# Patient Record
Sex: FEMALE | Race: WHITE | NOT HISPANIC OR LATINO | Employment: FULL TIME | URBAN - METROPOLITAN AREA
[De-identification: names, ages, dates, MRNs, and addresses within clinical notes are randomized per-mention and may not be internally consistent; named-entity substitution may affect disease eponyms.]

---

## 2017-01-12 ENCOUNTER — HOSPITAL ENCOUNTER (OUTPATIENT)
Dept: MAMMOGRAPHY | Facility: MEDICAL CENTER | Age: 42
Discharge: HOME | End: 2017-01-12
Payer: COMMERCIAL

## 2017-01-12 DIAGNOSIS — B02.29 OTHER POSTHERPETIC NERVOUS SYSTEM INVOLVEMENT: ICD-10-CM

## 2017-01-12 DIAGNOSIS — Z12.31 VISIT FOR SCREENING MAMMOGRAM: ICD-10-CM

## 2017-01-12 PROCEDURE — G0202 SCR MAMMO BI INCL CAD: HCPCS

## 2017-01-12 PROCEDURE — 77063 BREAST TOMOSYNTHESIS BI: CPT

## 2017-06-15 ENCOUNTER — APPOINTMENT (OUTPATIENT)
Dept: LAB | Facility: MEDICAL CENTER | Age: 42
End: 2017-06-15
Payer: COMMERCIAL

## 2017-06-15 ENCOUNTER — TRANSCRIBE ORDERS (OUTPATIENT)
Dept: ADMINISTRATIVE | Facility: HOSPITAL | Age: 42
End: 2017-06-15

## 2017-06-15 DIAGNOSIS — Z00.8 HEALTH EXAMINATION IN POPULATION SURVEY: ICD-10-CM

## 2017-06-15 DIAGNOSIS — Z00.8 HEALTH EXAMINATION IN POPULATION SURVEY: Primary | ICD-10-CM

## 2017-06-15 DIAGNOSIS — Z13.29 ENCOUNTER FOR SCREENING FOR OTHER SUSPECTED ENDOCRINE DISORDER: ICD-10-CM

## 2017-06-15 LAB
25(OH)D3 SERPL-MCNC: 28.4 NG/ML (ref 30–100)
ALBUMIN SERPL BCP-MCNC: 4.1 G/DL (ref 3.5–5)
ALP SERPL-CCNC: 54 U/L (ref 46–116)
ALT SERPL W P-5'-P-CCNC: 17 U/L (ref 12–78)
ANION GAP SERPL CALCULATED.3IONS-SCNC: 8 MMOL/L (ref 4–13)
AST SERPL W P-5'-P-CCNC: 14 U/L (ref 5–45)
BACTERIA UR QL AUTO: ABNORMAL /HPF
BASOPHILS # BLD AUTO: 0.03 THOUSANDS/ΜL (ref 0–0.1)
BASOPHILS NFR BLD AUTO: 0 % (ref 0–1)
BILIRUB SERPL-MCNC: 0.74 MG/DL (ref 0.2–1)
BILIRUB UR QL STRIP: NEGATIVE
BUN SERPL-MCNC: 13 MG/DL (ref 5–25)
CALCIUM SERPL-MCNC: 8.9 MG/DL (ref 8.3–10.1)
CHLORIDE SERPL-SCNC: 104 MMOL/L (ref 100–108)
CHOLEST SERPL-MCNC: 191 MG/DL (ref 50–200)
CLARITY UR: ABNORMAL
CO2 SERPL-SCNC: 27 MMOL/L (ref 21–32)
COLOR UR: YELLOW
CREAT SERPL-MCNC: 0.74 MG/DL (ref 0.6–1.3)
EOSINOPHIL # BLD AUTO: 0.15 THOUSAND/ΜL (ref 0–0.61)
EOSINOPHIL NFR BLD AUTO: 2 % (ref 0–6)
ERYTHROCYTE [DISTWIDTH] IN BLOOD BY AUTOMATED COUNT: 12.7 % (ref 11.6–15.1)
EST. AVERAGE GLUCOSE BLD GHB EST-MCNC: 111 MG/DL
GFR SERPL CREATININE-BSD FRML MDRD: >60 ML/MIN/1.73SQ M
GLUCOSE P FAST SERPL-MCNC: 85 MG/DL (ref 65–99)
GLUCOSE UR STRIP-MCNC: NEGATIVE MG/DL
HBA1C MFR BLD: 5.5 % (ref 4.2–6.3)
HCT VFR BLD AUTO: 40.1 % (ref 34.8–46.1)
HDLC SERPL-MCNC: 65 MG/DL (ref 40–60)
HGB BLD-MCNC: 13.4 G/DL (ref 11.5–15.4)
HGB UR QL STRIP.AUTO: NEGATIVE
KETONES UR STRIP-MCNC: NEGATIVE MG/DL
LDLC SERPL CALC-MCNC: 113 MG/DL (ref 0–100)
LEUKOCYTE ESTERASE UR QL STRIP: ABNORMAL
LYMPHOCYTES # BLD AUTO: 1.95 THOUSANDS/ΜL (ref 0.6–4.47)
LYMPHOCYTES NFR BLD AUTO: 21 % (ref 14–44)
MCH RBC QN AUTO: 30.1 PG (ref 26.8–34.3)
MCHC RBC AUTO-ENTMCNC: 33.4 G/DL (ref 31.4–37.4)
MCV RBC AUTO: 90 FL (ref 82–98)
MONOCYTES # BLD AUTO: 0.59 THOUSAND/ΜL (ref 0.17–1.22)
MONOCYTES NFR BLD AUTO: 6 % (ref 4–12)
NEUTROPHILS # BLD AUTO: 6.49 THOUSANDS/ΜL (ref 1.85–7.62)
NEUTS SEG NFR BLD AUTO: 71 % (ref 43–75)
NITRITE UR QL STRIP: NEGATIVE
NON-SQ EPI CELLS URNS QL MICRO: ABNORMAL /HPF
NRBC BLD AUTO-RTO: 0 /100 WBCS
PH UR STRIP.AUTO: 5.5 [PH] (ref 4.5–8)
PLATELET # BLD AUTO: 255 THOUSANDS/UL (ref 149–390)
PMV BLD AUTO: 10.3 FL (ref 8.9–12.7)
POTASSIUM SERPL-SCNC: 3.8 MMOL/L (ref 3.5–5.3)
PROT SERPL-MCNC: 7.3 G/DL (ref 6.4–8.2)
PROT UR STRIP-MCNC: NEGATIVE MG/DL
RBC # BLD AUTO: 4.45 MILLION/UL (ref 3.81–5.12)
RBC #/AREA URNS AUTO: ABNORMAL /HPF
SODIUM SERPL-SCNC: 139 MMOL/L (ref 136–145)
SP GR UR STRIP.AUTO: 1.02 (ref 1–1.03)
TRIGL SERPL-MCNC: 67 MG/DL
TSH SERPL DL<=0.05 MIU/L-ACNC: 1.36 UIU/ML (ref 0.36–3.74)
UROBILINOGEN UR QL STRIP.AUTO: 0.2 E.U./DL
WBC # BLD AUTO: 9.22 THOUSAND/UL (ref 4.31–10.16)
WBC #/AREA URNS AUTO: ABNORMAL /HPF

## 2017-06-15 PROCEDURE — 80061 LIPID PANEL: CPT

## 2017-06-15 PROCEDURE — 83036 HEMOGLOBIN GLYCOSYLATED A1C: CPT

## 2017-06-15 PROCEDURE — 85025 COMPLETE CBC W/AUTO DIFF WBC: CPT

## 2017-06-15 PROCEDURE — 80053 COMPREHEN METABOLIC PANEL: CPT

## 2017-06-15 PROCEDURE — 82306 VITAMIN D 25 HYDROXY: CPT

## 2017-06-15 PROCEDURE — 81001 URINALYSIS AUTO W/SCOPE: CPT

## 2017-06-15 PROCEDURE — 36415 COLL VENOUS BLD VENIPUNCTURE: CPT

## 2017-06-15 PROCEDURE — 84443 ASSAY THYROID STIM HORMONE: CPT

## 2018-04-25 ENCOUNTER — TRANSCRIBE ORDERS (OUTPATIENT)
Dept: INTERNAL MEDICINE CLINIC | Facility: CLINIC | Age: 43
End: 2018-04-25

## 2018-04-25 ENCOUNTER — TELEPHONE (OUTPATIENT)
Dept: INTERNAL MEDICINE CLINIC | Facility: CLINIC | Age: 43
End: 2018-04-25

## 2018-04-25 DIAGNOSIS — Z12.31 SCREENING MAMMOGRAM, ENCOUNTER FOR: Primary | ICD-10-CM

## 2018-04-25 NOTE — TELEPHONE ENCOUNTER
Pt called requesting a script for a routine mammogram  She usually goes to [facility] so they can pull it up in system  Please advise

## 2018-04-25 NOTE — TELEPHONE ENCOUNTER
I put in system but can you help me out   When patients call in the future can you ask them when they were in last  It looks like she hasnt been in since 2016 which could be wrong but if this is true she needs a physical

## 2018-05-22 ENCOUNTER — HOSPITAL ENCOUNTER (OUTPATIENT)
Dept: MAMMOGRAPHY | Facility: MEDICAL CENTER | Age: 43
Discharge: HOME | End: 2018-05-22
Payer: COMMERCIAL

## 2018-05-22 DIAGNOSIS — Z12.31 SCREENING MAMMOGRAM, ENCOUNTER FOR: ICD-10-CM

## 2018-05-22 PROCEDURE — 77063 BREAST TOMOSYNTHESIS BI: CPT

## 2018-05-22 PROCEDURE — 77067 SCR MAMMO BI INCL CAD: CPT

## 2018-06-13 ENCOUNTER — TRANSCRIBE ORDERS (OUTPATIENT)
Dept: ADMINISTRATIVE | Age: 43
End: 2018-06-13

## 2018-06-13 ENCOUNTER — APPOINTMENT (OUTPATIENT)
Dept: LAB | Age: 43
End: 2018-06-13

## 2018-06-13 DIAGNOSIS — Z00.8 HEALTH EXAMINATION IN POPULATION SURVEY: Primary | ICD-10-CM

## 2018-06-13 DIAGNOSIS — Z00.8 HEALTH EXAMINATION IN POPULATION SURVEY: ICD-10-CM

## 2018-06-13 LAB
CHOLEST SERPL-MCNC: 194 MG/DL (ref 50–200)
EST. AVERAGE GLUCOSE BLD GHB EST-MCNC: 108 MG/DL
HBA1C MFR BLD: 5.4 % (ref 4.2–6.3)
HDLC SERPL-MCNC: 61 MG/DL (ref 40–60)
LDLC SERPL CALC-MCNC: 117 MG/DL (ref 0–100)
NONHDLC SERPL-MCNC: 133 MG/DL
TRIGL SERPL-MCNC: 80 MG/DL

## 2018-06-13 PROCEDURE — 83036 HEMOGLOBIN GLYCOSYLATED A1C: CPT

## 2018-06-13 PROCEDURE — 36415 COLL VENOUS BLD VENIPUNCTURE: CPT

## 2018-06-13 PROCEDURE — 80061 LIPID PANEL: CPT

## 2018-11-21 ENCOUNTER — OFFICE VISIT (OUTPATIENT)
Dept: FAMILY MEDICINE CLINIC | Facility: CLINIC | Age: 43
End: 2018-11-21
Payer: COMMERCIAL

## 2018-11-21 VITALS
HEART RATE: 83 BPM | DIASTOLIC BLOOD PRESSURE: 78 MMHG | WEIGHT: 151 LBS | OXYGEN SATURATION: 98 % | SYSTOLIC BLOOD PRESSURE: 112 MMHG | BODY MASS INDEX: 24.27 KG/M2 | HEIGHT: 66 IN

## 2018-11-21 DIAGNOSIS — Z00.00 ANNUAL PHYSICAL EXAM: ICD-10-CM

## 2018-11-21 DIAGNOSIS — Z13.29 SCREENING FOR THYROID DISORDER: Primary | ICD-10-CM

## 2018-11-21 DIAGNOSIS — B02.9 HERPES ZOSTER WITHOUT COMPLICATION: ICD-10-CM

## 2018-11-21 DIAGNOSIS — Z01.419 ENCOUNTER FOR GYNECOLOGICAL EXAMINATION: ICD-10-CM

## 2018-11-21 DIAGNOSIS — Z13.21 ENCOUNTER FOR VITAMIN DEFICIENCY SCREENING: ICD-10-CM

## 2018-11-21 PROCEDURE — 99396 PREV VISIT EST AGE 40-64: CPT | Performed by: INTERNAL MEDICINE

## 2018-11-21 RX ORDER — VALACYCLOVIR HYDROCHLORIDE 1 G/1
1000 TABLET, FILM COATED ORAL 3 TIMES DAILY
Qty: 21 TABLET | Refills: 0 | Status: SHIPPED | OUTPATIENT
Start: 2018-11-21 | End: 2018-11-21 | Stop reason: SDUPTHER

## 2018-11-21 RX ORDER — VALACYCLOVIR HYDROCHLORIDE 1 G/1
1000 TABLET, FILM COATED ORAL 3 TIMES DAILY
Qty: 21 TABLET | Refills: 0 | Status: SHIPPED | OUTPATIENT
Start: 2018-11-21 | End: 2021-03-29

## 2018-11-21 RX ORDER — MENTHOL AND ZINC OXIDE .44; 20.625 G/100G; G/100G
OINTMENT TOPICAL 2 TIMES DAILY
Qty: 1 TUBE | Refills: 0 | Status: SHIPPED | OUTPATIENT
Start: 2018-11-21 | End: 2018-11-21 | Stop reason: SDUPTHER

## 2018-11-21 RX ORDER — MENTHOL AND ZINC OXIDE .44; 20.625 G/100G; G/100G
OINTMENT TOPICAL 2 TIMES DAILY
Qty: 1 TUBE | Refills: 0 | Status: SHIPPED | OUTPATIENT
Start: 2018-11-21 | End: 2021-03-29

## 2018-11-21 RX ORDER — BIOTIN 1 MG
TABLET ORAL
COMMUNITY

## 2018-11-21 NOTE — PROGRESS NOTES
ADULT ANNUAL PHYSICAL  [facility]    NAME: Felice Singh  AGE: 37 y o  SEX: female  : 1975     DATE: 2018     Assessment and Plan:     Diagnoses and all orders for this visit:    Screening for thyroid disorder  -     CBC and differential  -     Comprehensive metabolic panel  -     TSH, 3rd generation with Free T4 reflex    Annual physical exam    Encounter for vitamin deficiency screening  -     Vitamin D 25 hydroxy    Encounter for gynecological examination  -     Ambulatory referral to Gynecology; Future    Herpes zoster without complication  -     Discontinue: valACYclovir (VALTREX) 1,000 mg tablet; Take 1 tablet (1,000 mg total) by mouth 3 (three) times a day for 7 days  -     Discontinue: menthol-zinc oxide (CALMOSEPTINE) 0 44-20 625 %; Apply topically 2 (two) times a day  -     menthol-zinc oxide (CALMOSEPTINE) 0 44-20 625 %; Apply topically 2 (two) times a day  -     valACYclovir (VALTREX) 1,000 mg tablet; Take 1 tablet (1,000 mg total) by mouth 3 (three) times a day for 7 days    Other orders  -     Cholecalciferol (VITAMIN D3) 1000 units CAPS; Take by mouth  -     Ascorbic Acid (VITAMIN C) 500 MG/5ML LIQD; Take 1 tablet by mouth daily  -     Calcium Carbonate (CALTRATE 600) 1500 (600 Ca) MG TABS; Take by mouth        Health maintenance and preventative care screenings were discussed with patient today  Appropriate education was printed on patient's after visit summary  · Discussed risks/benefits of screening for breast cancer  Patient is up-to-date with their preventive screenings  · Immunizations were reviewed: patient is up-to-date with her immunizations  Counseling:  · Sexual health: discussed sexually transmitted diseases, partner selection, use of condoms, avoidance of unintended pregnancy, and contraceptive alternatives  No Follow-up on file       Chief Complaint:     Chief Complaint   Patient presents with   Leidy Perales Follow-up    Rash      History of Present Illness:     Adult Annual Physical   Patient here for a comprehensive physical exam  The patient reports problems - Rash right breast     Diet and Physical Activity  · Diet/Nutrition: well balanced diet  · Weight concerns: none, patient's BMI is between 18 5-24 9  · Exercise: walking  Depression Screening  PHQ-9 Depression Screening    PHQ-9:    Frequency of the following problems over the past two weeks:       Little interest or pleasure in doing things:  0 - not at all  Feeling down, depressed, or hopeless:  0 - not at all  PHQ-2 Score:  0       General Health  · Sleep: sleeps well  · Hearing: normal - bilateral   · Vision: no vision problems  · Dental: regular dental visits  /GYN Health  · Patient is: premenopausal  · Last menstrual period:   · Contraceptive method: barrier methods  · Menopausal symptoms: none  Review of Systems:     Review of Systems   Constitutional: Negative for appetite change, chills, fatigue, fever and unexpected weight change  HENT: Negative for congestion, hearing loss, postnasal drip, trouble swallowing and voice change  Eyes: Negative for pain and visual disturbance  Respiratory: Negative for cough, chest tightness and shortness of breath  Cardiovascular: Negative for chest pain, palpitations and leg swelling  Gastrointestinal: Negative for abdominal pain, blood in stool, constipation, diarrhea, nausea and vomiting  Endocrine: Negative for cold intolerance, heat intolerance, polydipsia and polyphagia  Genitourinary: Negative for difficulty urinating, flank pain, frequency and hematuria  Musculoskeletal: Negative for arthralgias, back pain, gait problem, joint swelling and myalgias  Skin: Positive for rash  Pruritic rash for 3 days   Neurological: Negative for dizziness, weakness, light-headedness, numbness and headaches  Hematological: Negative for adenopathy  Does not bruise/bleed easily  Psychiatric/Behavioral: Negative for confusion, dysphoric mood and sleep disturbance  Past Medical History:     Past Medical History:   Diagnosis Date    Anxiety     Back pain     CVA (cerebral vascular accident) ([address] )     Depression     Hypertension     Palpitation     TIA (transient ischemic attack)     Ventral hernia       Past Surgical History:     No past surgical history on file  Social History:     Social History     Social History    Marital status: Single     Spouse name: N/A    Number of children: N/A    Years of education: N/A     Social History Main Topics    Smoking status: Former Smoker    Smokeless tobacco: Never Used    Alcohol use No    Drug use: No    Sexual activity: Yes     Partners: Male     Birth control/ protection: Post-menopausal     Other Topics Concern    None     Social History Narrative    Pt states she has coffee daily      Family History:     No family history on file  Current Medications:     Current Outpatient Prescriptions   Medication Sig Dispense Refill    Ascorbic Acid (VITAMIN C) 500 MG/5ML LIQD Take 1 tablet by mouth daily      Calcium Carbonate (CALTRATE 600) 1500 (600 Ca) MG TABS Take by mouth      Cholecalciferol (VITAMIN D3) 1000 units CAPS Take by mouth      menthol-zinc oxide (CALMOSEPTINE) 0 44-20 625 % Apply topically 2 (two) times a day 1 Tube 0    valACYclovir (VALTREX) 1,000 mg tablet Take 1 tablet (1,000 mg total) by mouth 3 (three) times a day for 7 days 21 tablet 0     No current facility-administered medications for this visit  Allergies:     No Known Allergies   Objective:     /78 (BP Location: Left arm, Patient Position: Sitting, Cuff Size: Standard)   Pulse 83   Ht 5' 6" (1 676 m)   Wt 68 5 kg (151 lb)   SpO2 98%   BMI 24 37 kg/m²   Physical Exam   Constitutional: She appears well-developed and well-nourished  No distress  HENT:   Head: Normocephalic and atraumatic     Eyes: Pupils are equal, round, and reactive to light  Conjunctivae are normal  No scleral icterus  Neck: No thyromegaly present  Cardiovascular: Normal rate, regular rhythm and normal heart sounds  Exam reveals no gallop and no friction rub  No murmur heard  Pulmonary/Chest: Effort normal and breath sounds normal  No respiratory distress  She has no wheezes  She has no rales  She exhibits no tenderness  Abdominal: Soft  Bowel sounds are normal  She exhibits no distension and no mass  There is no tenderness  There is no rebound and no guarding  Musculoskeletal: She exhibits no edema  Lymphadenopathy:     She has no cervical adenopathy  Neurological: No cranial nerve deficit  Skin: Rash noted  Two groups of vesicles erythematous base right breast T4 dermatome  Psychiatric: She has a normal mood and affect   Her behavior is normal  Thought content normal         Health Maintenance:     Health Maintenance Topics with due status: Not Due       Topic Last Completion Date    Depression Screening [facility] 11/21/2018     Health Maintenance Topics with due status: Overdue       Topic Date Due    DTaP,Tdap,and Td Vaccines 06/01/1994     Immunization History   Administered Date(s) Administered    Fluzone Split Quad 0 5 mL 10/21/2018    Influenza 11/14/2016       Crystal Spencer MD  [address]

## 2019-03-18 ENCOUNTER — TELEPHONE (OUTPATIENT)
Dept: FAMILY MEDICINE CLINIC | Facility: CLINIC | Age: 44
End: 2019-03-18

## 2019-03-18 ENCOUNTER — APPOINTMENT (OUTPATIENT)
Dept: LAB | Facility: MEDICAL CENTER | Age: 44
End: 2019-03-18
Payer: COMMERCIAL

## 2019-03-18 LAB
25(OH)D3 SERPL-MCNC: 37.2 NG/ML (ref 30–100)
ALBUMIN SERPL BCP-MCNC: 3.8 G/DL (ref 3.5–5)
ALP SERPL-CCNC: 50 U/L (ref 46–116)
ALT SERPL W P-5'-P-CCNC: 18 U/L (ref 12–78)
ANION GAP SERPL CALCULATED.3IONS-SCNC: 1 MMOL/L (ref 4–13)
AST SERPL W P-5'-P-CCNC: 14 U/L (ref 5–45)
BASOPHILS # BLD AUTO: 0.05 THOUSANDS/ΜL (ref 0–0.1)
BASOPHILS NFR BLD AUTO: 1 % (ref 0–1)
BILIRUB SERPL-MCNC: 0.8 MG/DL (ref 0.2–1)
BUN SERPL-MCNC: 15 MG/DL (ref 5–25)
CALCIUM SERPL-MCNC: 8.5 MG/DL (ref 8.3–10.1)
CHLORIDE SERPL-SCNC: 106 MMOL/L (ref 100–108)
CO2 SERPL-SCNC: 29 MMOL/L (ref 21–32)
CREAT SERPL-MCNC: 0.82 MG/DL (ref 0.6–1.3)
EOSINOPHIL # BLD AUTO: 0.23 THOUSAND/ΜL (ref 0–0.61)
EOSINOPHIL NFR BLD AUTO: 3 % (ref 0–6)
ERYTHROCYTE [DISTWIDTH] IN BLOOD BY AUTOMATED COUNT: 13.1 % (ref 11.6–15.1)
GFR SERPL CREATININE-BSD FRML MDRD: 87 ML/MIN/1.73SQ M
GLUCOSE P FAST SERPL-MCNC: 82 MG/DL (ref 65–99)
HCT VFR BLD AUTO: 38.6 % (ref 34.8–46.1)
HGB BLD-MCNC: 12.1 G/DL (ref 11.5–15.4)
IMM GRANULOCYTES # BLD AUTO: 0.04 THOUSAND/UL (ref 0–0.2)
IMM GRANULOCYTES NFR BLD AUTO: 1 % (ref 0–2)
LYMPHOCYTES # BLD AUTO: 1.66 THOUSANDS/ΜL (ref 0.6–4.47)
LYMPHOCYTES NFR BLD AUTO: 24 % (ref 14–44)
MCH RBC QN AUTO: 29.4 PG (ref 26.8–34.3)
MCHC RBC AUTO-ENTMCNC: 31.3 G/DL (ref 31.4–37.4)
MCV RBC AUTO: 94 FL (ref 82–98)
MONOCYTES # BLD AUTO: 0.41 THOUSAND/ΜL (ref 0.17–1.22)
MONOCYTES NFR BLD AUTO: 6 % (ref 4–12)
NEUTROPHILS # BLD AUTO: 4.47 THOUSANDS/ΜL (ref 1.85–7.62)
NEUTS SEG NFR BLD AUTO: 65 % (ref 43–75)
NRBC BLD AUTO-RTO: 0 /100 WBCS
PLATELET # BLD AUTO: 183 THOUSANDS/UL (ref 149–390)
PMV BLD AUTO: 10.2 FL (ref 8.9–12.7)
POTASSIUM SERPL-SCNC: 4 MMOL/L (ref 3.5–5.3)
PROT SERPL-MCNC: 7.1 G/DL (ref 6.4–8.2)
RBC # BLD AUTO: 4.11 MILLION/UL (ref 3.81–5.12)
SODIUM SERPL-SCNC: 136 MMOL/L (ref 136–145)
TSH SERPL DL<=0.05 MIU/L-ACNC: 1.43 UIU/ML (ref 0.36–3.74)
WBC # BLD AUTO: 6.86 THOUSAND/UL (ref 4.31–10.16)

## 2019-03-18 PROCEDURE — 85025 COMPLETE CBC W/AUTO DIFF WBC: CPT | Performed by: INTERNAL MEDICINE

## 2019-03-18 PROCEDURE — 84443 ASSAY THYROID STIM HORMONE: CPT | Performed by: INTERNAL MEDICINE

## 2019-03-18 PROCEDURE — 36415 COLL VENOUS BLD VENIPUNCTURE: CPT | Performed by: INTERNAL MEDICINE

## 2019-03-18 PROCEDURE — 82306 VITAMIN D 25 HYDROXY: CPT | Performed by: INTERNAL MEDICINE

## 2019-03-18 PROCEDURE — 80053 COMPREHEN METABOLIC PANEL: CPT | Performed by: INTERNAL MEDICINE

## 2019-12-05 ENCOUNTER — TELEPHONE (OUTPATIENT)
Dept: OBGYN CLINIC | Facility: MEDICAL CENTER | Age: 44
End: 2019-12-05

## 2019-12-05 NOTE — TELEPHONE ENCOUNTER
Pt called wondering if we can provide a mammo order  She will be seen as a new pt on 12/11 in [facility], but wants to have her mammogram before the end of the year, and for the results to be in before the visit

## 2019-12-06 NOTE — TELEPHONE ENCOUNTER
Please add mammo order for patient, and call patient and let her know the order is in the system  Thank you

## 2019-12-09 ENCOUNTER — TRANSCRIBE ORDERS (OUTPATIENT)
Dept: ADMINISTRATIVE | Facility: HOSPITAL | Age: 44
End: 2019-12-09

## 2019-12-09 DIAGNOSIS — Z12.31 VISIT FOR SCREENING MAMMOGRAM: Primary | ICD-10-CM

## 2019-12-09 DIAGNOSIS — Z12.39 SCREENING FOR BREAST CANCER: Primary | ICD-10-CM

## 2019-12-10 ENCOUNTER — HOSPITAL ENCOUNTER (OUTPATIENT)
Dept: RADIOLOGY | Age: 44
Discharge: HOME | End: 2019-12-10
Payer: COMMERCIAL

## 2019-12-10 VITALS — BODY MASS INDEX: 24.27 KG/M2 | HEIGHT: 66 IN | WEIGHT: 151 LBS

## 2019-12-10 DIAGNOSIS — Z12.31 VISIT FOR SCREENING MAMMOGRAM: ICD-10-CM

## 2019-12-10 DIAGNOSIS — Z12.39 SCREENING FOR BREAST CANCER: ICD-10-CM

## 2019-12-10 PROCEDURE — 77067 SCR MAMMO BI INCL CAD: CPT

## 2019-12-10 PROCEDURE — 77063 BREAST TOMOSYNTHESIS BI: CPT

## 2019-12-11 ENCOUNTER — OFFICE VISIT (OUTPATIENT)
Dept: OBGYN CLINIC | Facility: CLINIC | Age: 44
End: 2019-12-11
Payer: COMMERCIAL

## 2019-12-11 VITALS — DIASTOLIC BLOOD PRESSURE: 70 MMHG | WEIGHT: 155.8 LBS | SYSTOLIC BLOOD PRESSURE: 128 MMHG | BODY MASS INDEX: 25.15 KG/M2

## 2019-12-11 DIAGNOSIS — Z12.31 ENCOUNTER FOR SCREENING MAMMOGRAM FOR MALIGNANT NEOPLASM OF BREAST: ICD-10-CM

## 2019-12-11 DIAGNOSIS — Z01.419 ENCOUNTER FOR GYNECOLOGICAL EXAMINATION WITH PAPANICOLAOU SMEAR OF CERVIX: Primary | ICD-10-CM

## 2019-12-11 PROCEDURE — 99386 PREV VISIT NEW AGE 40-64: CPT | Performed by: OBSTETRICS & GYNECOLOGY

## 2019-12-11 PROCEDURE — G0145 SCR C/V CYTO,THINLAYER,RESCR: HCPCS | Performed by: OBSTETRICS & GYNECOLOGY

## 2019-12-11 PROCEDURE — 87624 HPV HI-RISK TYP POOLED RSLT: CPT | Performed by: OBSTETRICS & GYNECOLOGY

## 2019-12-11 NOTE — PATIENT INSTRUCTIONS
Thank you for your confidence in our team    We appreciate you and welcome your feedback  If you receive a survey from us, please take a few moments to let us know how we are doing     Sincerely,   Varun Garcia MD

## 2019-12-11 NOTE — PROGRESS NOTES
ASSESSMENT & PLAN: Shweta Schumacher is a 53 y o   with normal gynecologic exam     1   Routine well woman exam done today  2  Pap and HPV:  The patient's last pap was   It was normal     Pap and cotesting was done today  Current ASCCP Guidelines reviewed  3   Mammogram ordered  4  The following were reviewed in today's visit: breast self exam and mammography screening ordered  5  Contraception: none    CC:  Annual Gynecologic Examination    HPI: Shweta Schumacher is a 53 y o  [facility] who presents for annual gynecologic examination  She has the following concerns:  Reports consistent vaginal   Discharge, has had it for years; no itching, burning, odor or irriation  Health Maintenance:    She wears her seatbelt routinely  She does perform regular monthly self breast exams  She feels safe at home  There is no problem list on file for this patient  Past Medical History:   Diagnosis Date    Anxiety     Back pain     [address]rebral vascular [address]) [address] [address]     Depression     Hypertension     Palpitation     TIA (transient ischemic attack)     Ventral hernia        History reviewed  No pertinent surgical history  Past OB/Gyn History:  OB History        0    Para   0    Term   0       0    AB   0    Living   0       SAB   0    TAB   0    Ectopic   0    Multiple   0    Live Births   0                  Pt does not have menstrual issues     History of sexually transmitted infection: No   History of abnormal pap smears: No       Patient is not currently sexually active  heterosexual  The current method of family planning is none      Family History   Problem Relation Age of Onset    No Known Problems Mother     Prostate cancer Father 79    No Known Problems Maternal Grandmother     No Known Problems Maternal Grandfather     No Known Problems Paternal Grandmother     No Known Problems Paternal Grandfather     No Known Problems Maternal Aunt     No Known Problems Paternal Aunt        Social History:  Social History     Socioeconomic History    Marital status: Single     Spouse name: Not on file    Number of children: Not on file    Years of education: Not on file    Highest education level: Not on file   Occupational History    Not on file   Social Needs    Financial resource strain: Not on file    Food insecurity:     Worry: Not on file     Inability: Not on file    Transportation needs:     Medical: Not on file     Non-medical: Not on file   Tobacco Use    Smoking status: Former Smoker    Smokeless tobacco: Never Used   Substance and Sexual Activity    Alcohol use: Yes     Frequency: 2-3 times a week    Drug use: No    Sexual activity: Not Currently     Partners: Male   Lifestyle    Physical activity:     Days per week: Not on file     Minutes per session: Not on file    Stress: Not on file   Relationships    Social connections:     Talks on phone: Not on file     Gets together: Not on file     Attends Voodoo service: Not on file     Active member of club or organization: Not on file     Attends meetings of clubs or organizations: Not on file     Relationship status: Not on file    Intimate partner violence:     Fear of current or ex partner: Not on file     Emotionally abused: Not on file     Physically abused: Not on file     Forced sexual activity: Not on file   Other Topics Concern    Not on file   Social History Narrative    Pt states she has coffee daily     No Known Allergies    Current Outpatient Medications:     Ascorbic Acid (VITAMIN C) 500 MG/5ML LIQD, Take 1 tablet by mouth daily, Disp: , Rfl:     Calcium Carbonate (CALTRATE 600) 1500 (600 Ca) MG TABS, Take by mouth, Disp: , Rfl:     Cholecalciferol (VITAMIN D3) 1000 units CAPS, Take by mouth, Disp: , Rfl:     menthol-zinc oxide (CALMOSEPTINE) 0 44-20 625 %, Apply topically 2 (two) times a day (Patient not taking: Reported on 12/11/2019), Disp: 1 Tube, Rfl: 0    valACYclovir (VALTREX) 1,000 mg tablet, Take 1 tablet (1,000 mg total) by mouth 3 (three) times a day for 7 days, Disp: 21 tablet, Rfl: 0    Review of Systems:    Review of Systems  Constitutional :no fever, feels well, no tiredness, no recent weight gain or loss  ENT: no ear ache, no loss of hearing, no nosebleeds or nasal discharge, no sore throat or hoarseness  Cardiovascular: no complaints of slow or fast heart beat, no chest pain, no palpitations, no leg claudication or lower extremity edema  Respiratory: no complaints of shortness of shortness of breath, no HOWARD  Breasts:no complaints of breast pain, breast lump, or nipple discharge  Gastrointestinal: no complaints of abdominal pain, constipation, nausea, vomiting, or diarrhea or bloody stools  Genitourinary : no complaints of dysuria, incontinence, pelvic pain, no dysmenorrhea, vaginal discharge or abnormal vaginal bleeding and as noted in HPI  Musculoskeletal: no complaints of arthralgia, no myalgia, no joint swelling or stiffness, no limb pain or swelling    Integumentary: no complaints of skin rash or lesion, itching or dry skin  Neurological: no complaints of headache, no confusion, no numbness or tingling, no dizziness or fainting    Objective      /70   Wt 70 7 kg (155 lb 12 8 oz)   LMP 11/28/2019 (Exact Date)   BMI 25 15 kg/m²     General:   appears stated age, cooperative, alert normal mood and affect   Neck: normal, supple,trachea midline, no masses   Heart: regular rate and rhythm, S1, S2 normal, no murmur, click, rub or gallop   Lungs: clear to auscultation bilaterally   Breasts: normal appearance, no masses or tenderness, Inspection negative, No nipple retraction or dimpling, No nipple discharge or bleeding, No axillary or supraclavicular adenopathy, Normal to palpation without dominant masses   Abdomen: soft, non-tender, without masses or organomegaly   Vulva: normal female genitalia, Bartholin's, Urethra, Zephyr normal, no lesions, normal female hair distribution   Vagina: normal vagina, no discharge, exudate, lesion, or erythema   Urethra: normal   Cervix: Normal, no discharge  PAP done  Uterus: normal size, contour, position, consistency, mobility, non-tender   Adnexa: normal adnexa and no mass, fullness, tenderness   Lymphatic palpation of lymph nodes in neck, axilla, groin and/or other locations: no lymphadenopathy or masses noted   Skin normal skin turgor and no rashes     Psychiatric orientation to person, place, and time: normal  mood and affect: normal

## 2019-12-13 LAB
HPV HR 12 DNA CVX QL NAA+PROBE: NEGATIVE
HPV16 DNA CVX QL NAA+PROBE: NEGATIVE
HPV18 DNA CVX QL NAA+PROBE: NEGATIVE

## 2019-12-16 LAB
LAB AP GYN PRIMARY INTERPRETATION: NORMAL
Lab: NORMAL

## 2020-02-20 ENCOUNTER — OFFICE VISIT (OUTPATIENT)
Dept: URGENT CARE | Facility: CLINIC | Age: 45
End: 2020-02-20
Payer: COMMERCIAL

## 2020-02-20 VITALS
TEMPERATURE: 98.1 F | DIASTOLIC BLOOD PRESSURE: 82 MMHG | HEART RATE: 88 BPM | SYSTOLIC BLOOD PRESSURE: 126 MMHG | RESPIRATION RATE: 18 BRPM | OXYGEN SATURATION: 99 %

## 2020-02-20 DIAGNOSIS — J01.00 ACUTE NON-RECURRENT MAXILLARY SINUSITIS: Primary | ICD-10-CM

## 2020-02-20 PROCEDURE — G0382 LEV 3 HOSP TYPE B ED VISIT: HCPCS | Performed by: NURSE PRACTITIONER

## 2020-02-20 RX ORDER — PREDNISONE 20 MG/1
20 TABLET ORAL 2 TIMES DAILY WITH MEALS
Qty: 10 TABLET | Refills: 0 | Status: SHIPPED | OUTPATIENT
Start: 2020-02-20 | End: 2020-02-25

## 2020-02-20 RX ORDER — ASCORBIC ACID 500 MG
500 TABLET ORAL DAILY
COMMUNITY

## 2020-02-20 NOTE — PROGRESS NOTES
[facility]        NAME: Dirk Brito is a 53 y o  female  : 1975    MRN: [id number]  DATE: 2020  TIME: 7:28 PM    Assessment and Plan   Acute non-recurrent maxillary sinusitis [J01 00]  1  Acute non-recurrent maxillary sinusitis  predniSONE 20 mg tablet     No antibiotics at this time   rec trial prednisone   Start antihistamine with decongestant  Pt in agreement with plan of care    Patient Instructions     Follow up with PCP in 3-5 days  Proceed to  ER if symptoms worsen  Chief Complaint     Chief Complaint   Patient presents with    Cold Like Symptoms     Patient states that she has head congestion, facial pressure  Post nasal drip and a scratchy throat since MOnday         History of Present Illness   Dirk Brito presents to the clinic c/o    Pt states started Monday - sinus infection like symptoms   Yellow mucous   Head pressure   Sneezing -   Scratchy throat  Has been taking zycam since Monday with no improvement in symptom  Saline nasal spray  No allergy medications, no flonase  No decongestants  Review of Systems   Review of Systems   All other systems reviewed and are negative          Current Medications     Long-Term Medications   Medication Sig Dispense Refill    ascorbic acid (VITAMIN C) 500 mg tablet Take 500 mg by mouth daily      Ascorbic Acid (VITAMIN C) 500 MG/5ML LIQD Take 1 tablet by mouth daily      Calcium Carbonate (CALTRATE 600) 1500 (600 Ca) MG TABS Take by mouth      Cholecalciferol (VITAMIN D3) 1000 units CAPS Take by mouth      menthol-zinc oxide (CALMOSEPTINE) 0 44-20 625 % Apply topically 2 (two) times a day (Patient not taking: Reported on 2019) 1 Tube 0    valACYclovir (VALTREX) 1,000 mg tablet Take 1 tablet (1,000 mg total) by mouth 3 (three) times a day for 7 days 21 tablet 0       Current Allergies     Allergies as of 2020    (No Known Allergies)            The following portions of the patient's history were reviewed and updated as appropriate: allergies, current medications, past family history, past medical history, past social history, past surgical history and problem list     Objective   /82   Pulse 88   Temp 98 1 °F (36 7 °C) (Tympanic)   Resp 18   LMP 02/11/2020   SpO2 99%        Physical Exam     Physical Exam   Constitutional: She is oriented to person, place, and time  Vital signs are normal  She appears well-developed and well-nourished  She is active and cooperative  HENT:   Head: Normocephalic and atraumatic  Right Ear: Hearing, tympanic membrane, external ear and ear canal normal    Left Ear: Hearing, tympanic membrane, external ear and ear canal normal    Nose: Mucosal edema present  Right sinus exhibits no maxillary sinus tenderness and no frontal sinus tenderness  Left sinus exhibits no maxillary sinus tenderness and no frontal sinus tenderness  Mouth/Throat: Uvula is midline, oropharynx is clear and moist and mucous membranes are normal  Oropharyngeal exudate: pnd    Eyes: Conjunctivae and lids are normal    Cardiovascular: Normal rate, regular rhythm, S1 normal, S2 normal and normal heart sounds  Pulmonary/Chest: Effort normal and breath sounds normal    Neurological: She is alert and oriented to person, place, and time  Skin: Skin is warm and dry  Psychiatric: She has a normal mood and affect  Her speech is normal and behavior is normal  Judgment and thought content normal  Cognition and memory are normal    Nursing note and vitals reviewed

## 2020-02-20 NOTE — PATIENT INSTRUCTIONS
Rec zyrtec-D or Allegra D   flonase nasal spray   Prednisone as directed    Upper Respiratory Infection   WHAT YOU NEED TO KNOW:   An upper respiratory infection is also called the common cold  It is an infection that can affect your nose, throat, ears, and sinuses  For healthy people, the common cold is usually not serious and does not need special treatment  Cold symptoms are usually worst for the first 3 to 5 days  Most people get better in 7 to 14 days  You may continue to cough for 2 to 3 weeks  Colds are caused by viruses and do not get better with antibiotics  DISCHARGE INSTRUCTIONS:   Return to the emergency department if:   · You have chest pain or trouble breathing  Contact your healthcare provider if:   · You have a fever over 102ºF (39°C)  · Your sore throat gets worse or you see white or yellow spots in your throat  · Your symptoms get worse after 3 to 5 days or your cold is not better in 14 days  · You have a rash anywhere on your skin  · You have large, tender lumps in your neck  · You have thick, green or yellow drainage from your nose  · You cough up thick yellow, green, or bloody mucus  · You have vomiting for more than 24 hours and cannot keep fluids down  · You have a bad earache  · You have questions or concerns about your condition or care  Medicines: You may need any of the following:  · Decongestants  help reduce nasal congestion and help you breathe more easily  If you take decongestant pills, they may make you feel restless or cause problems with your sleep  Do not use decongestant sprays for more than a few days  · Cough suppressants  help reduce coughing  Ask your healthcare provider which type of cough medicine is best for you  · NSAIDs , such as ibuprofen, help decrease swelling, pain, and fever  NSAIDs can cause stomach bleeding or kidney problems in certain people   If you take blood thinner medicine, always ask your healthcare provider if NSAIDs are safe for you  Always read the medicine label and follow directions  · Acetaminophen  decreases pain and fever  It is available without a doctor's order  Ask how much to take and how often to take it  Follow directions  Read the labels of all other medicines you are using to see if they also contain acetaminophen, or ask your doctor or pharmacist  Acetaminophen can cause liver damage if not taken correctly  Do not use more than 4 grams (4,000 milligrams) total of acetaminophen in one day  · Take your medicine as directed  Contact your healthcare provider if you think your medicine is not helping or if you have side effects  Tell him or her if you are allergic to any medicine  Keep a list of the medicines, vitamins, and herbs you take  Include the amounts, and when and why you take them  Bring the list or the pill bottles to follow-up visits  Carry your medicine list with you in case of an emergency  Follow up with your healthcare provider as directed:  Write down your questions so you remember to ask them during your visits  Self-care:   · Rest as much as possible  Slowly start to do more each day  · Drink more liquids as directed  Liquids will help thin and loosen mucus so you can cough it up  Liquids will also help prevent dehydration  Liquids that help prevent dehydration include water, fruit juice, and broth  Do not drink liquids that contain caffeine  Caffeine can increase your risk for dehydration  Ask your healthcare provider how much liquid to drink each day  · Soothe a sore throat  Gargle with warm salt water  This helps your sore throat feel better  Make salt water by dissolving ¼ teaspoon salt in 1 cup warm water  You may also suck on hard candy or throat lozenges  You may use a sore throat spray  · Use a humidifier or vaporizer  Use a cool mist humidifier or a vaporizer to increase air moisture in your home   This may make it easier for you to breathe and help decrease your cough  · Use saline nasal drops as directed  These help relieve congestion  · Apply petroleum-based jelly around the outside of your nostrils  This can decrease irritation from blowing your nose  · Do not smoke  Nicotine and other chemicals in cigarettes and cigars can make your symptoms worse  They can also cause infections such as bronchitis or pneumonia  Ask your healthcare provider for information if you currently smoke and need help to quit  E-cigarettes or smokeless tobacco still contain nicotine  Talk to your healthcare provider before you use these products  Prevent spreading your cold to others:   · Try to stay away from other people during the first 2 to 3 days of your cold when it is more easily spread  · Do not share food or drinks  · Do not share hand towels with household members  · Wash your hands often, especially after you blow your nose  Turn away from other people and cover your mouth and nose with a tissue when you sneeze or cough  © 2017 [employer]  Information is for End User's use only and may not be sold, redistributed or otherwise used for commercial purposes  All illustrations and images included in CareNotes® are the copyrighted property of [employer]  or [employer]  The above information is an  only  It is not intended as medical advice for individual conditions or treatments  Talk to your doctor, nurse or pharmacist before following any medical regimen to see if it is safe and effective for you

## 2020-05-15 ENCOUNTER — OFFICE VISIT (OUTPATIENT)
Dept: URGENT CARE | Facility: CLINIC | Age: 45
End: 2020-05-15
Payer: COMMERCIAL

## 2020-05-15 VITALS
DIASTOLIC BLOOD PRESSURE: 90 MMHG | SYSTOLIC BLOOD PRESSURE: 154 MMHG | TEMPERATURE: 99.4 F | RESPIRATION RATE: 18 BRPM | HEART RATE: 97 BPM | OXYGEN SATURATION: 97 %

## 2020-05-15 DIAGNOSIS — J02.9 PHARYNGITIS, UNSPECIFIED ETIOLOGY: Primary | ICD-10-CM

## 2020-05-15 LAB — S PYO AG THROAT QL: NEGATIVE

## 2020-05-15 PROCEDURE — 87070 CULTURE OTHR SPECIMN AEROBIC: CPT | Performed by: PHYSICIAN ASSISTANT

## 2020-05-15 PROCEDURE — 87147 CULTURE TYPE IMMUNOLOGIC: CPT | Performed by: PHYSICIAN ASSISTANT

## 2020-05-18 LAB — BACTERIA THROAT CULT: ABNORMAL

## 2020-05-19 ENCOUNTER — TELEPHONE (OUTPATIENT)
Dept: URGENT CARE | Facility: CLINIC | Age: 45
End: 2020-05-19

## 2020-05-19 DIAGNOSIS — J02.0 STREP PHARYNGITIS: Primary | ICD-10-CM

## 2020-05-19 RX ORDER — AMOXICILLIN 875 MG/1
875 TABLET, COATED ORAL 2 TIMES DAILY
Qty: 14 TABLET | Refills: 0 | Status: SHIPPED | OUTPATIENT
Start: 2020-05-19 | End: 2020-05-26

## 2020-12-11 DIAGNOSIS — Z12.31 ENCOUNTER FOR SCREENING MAMMOGRAM FOR MALIGNANT NEOPLASM OF BREAST: Primary | ICD-10-CM

## 2020-12-15 ENCOUNTER — HOSPITAL ENCOUNTER (OUTPATIENT)
Dept: RADIOLOGY | Age: 45
Discharge: HOME | End: 2020-12-15
Payer: COMMERCIAL

## 2020-12-15 VITALS — HEIGHT: 66 IN | WEIGHT: 150 LBS | BODY MASS INDEX: 24.11 KG/M2

## 2020-12-15 DIAGNOSIS — Z12.31 ENCOUNTER FOR SCREENING MAMMOGRAM FOR MALIGNANT NEOPLASM OF BREAST: ICD-10-CM

## 2020-12-15 PROCEDURE — 77067 SCR MAMMO BI INCL CAD: CPT

## 2020-12-15 PROCEDURE — 77063 BREAST TOMOSYNTHESIS BI: CPT

## 2020-12-29 ENCOUNTER — IMMUNIZATIONS (OUTPATIENT)
Dept: FAMILY MEDICINE CLINIC | Facility: HOSPITAL | Age: 45
End: 2020-12-29
Payer: COMMERCIAL

## 2020-12-29 DIAGNOSIS — Z23 ENCOUNTER FOR IMMUNIZATION: ICD-10-CM

## 2020-12-29 PROCEDURE — 0011A SARS-COV-2 / COVID-19 MRNA VACCINE (MODERNA) 100 MCG: CPT

## 2020-12-29 PROCEDURE — 91301 SARS-COV-2 / COVID-19 MRNA VACCINE (MODERNA) 100 MCG: CPT

## 2021-01-19 ENCOUNTER — ANNUAL EXAM (OUTPATIENT)
Dept: OBGYN CLINIC | Facility: CLINIC | Age: 46
End: 2021-01-19
Payer: COMMERCIAL

## 2021-01-19 VITALS — WEIGHT: 153.2 LBS | SYSTOLIC BLOOD PRESSURE: 140 MMHG | DIASTOLIC BLOOD PRESSURE: 72 MMHG | BODY MASS INDEX: 24.73 KG/M2

## 2021-01-19 DIAGNOSIS — Z01.419 ENCOUNTER FOR WELL WOMAN EXAM WITH ROUTINE GYNECOLOGICAL EXAM: Primary | ICD-10-CM

## 2021-01-19 PROCEDURE — 99386 PREV VISIT NEW AGE 40-64: CPT | Performed by: OBSTETRICS & GYNECOLOGY

## 2021-01-19 PROCEDURE — G0145 SCR C/V CYTO,THINLAYER,RESCR: HCPCS | Performed by: OBSTETRICS & GYNECOLOGY

## 2021-01-19 NOTE — PROGRESS NOTES
ASSESSMENT & PLAN: Osbaldo Zee is a 50 y o   with normal gynecologic exam     1   Routine well woman exam done today  2  Pap and HPV:  The patient's last pap was   It was normal   Pap with reflex cotesting was done today  Current ASCCP Guidelines reviewed  3   Mammogram ordered  4  The following were reviewed in today's visit: breast self exam and mammography screening ordered  5  Contraception: none    CC:  Annual Gynecologic Examination    HPI: Osbaldo Zee is a 50 y o  [facility] who presents for annual gynecologic examination  She has the following concerns:  Patient reports irregular bleeding this past fall  States her cycles are typically every 27-29 days, but for 3 cylces, they occurred every 16 days  Her last cycle was 29 days  Health Maintenance:    She wears her seatbelt routinely  She does perform regular monthly self breast exams  She feels safe at home  There is no problem list on file for this patient  Past Medical History:   Diagnosis Date    Anxiety     Back pain     CVA (cerebral vascular accident) ([address] )     Depression     Hypertension     Palpitation     TIA (transient ischemic attack)     Ventral hernia        History reviewed  No pertinent surgical history  Past OB/Gyn History:  OB History        0    Para   0    Term   0       0    AB   0    Living   0       SAB   0    TAB   0    Ectopic   0    Multiple   0    Live Births   0                  Pt does not have menstrual issues     History of sexually transmitted infection: No   History of abnormal pap smears: No       Patient is not currently sexually active  heterosexual  The current method of family planning is none      Family History   Problem Relation Age of Onset    No Known Problems Mother     Prostate cancer Father 79    No Known Problems Maternal Grandmother     No Known Problems Maternal Grandfather     No Known Problems Paternal Grandmother     No Known Problems Paternal Grandfather     No Known Problems Maternal Aunt     No Known Problems Paternal Aunt        Social History:  Social History     Socioeconomic History    Marital status: Single     Spouse name: Not on file    Number of children: Not on file    Years of education: Not on file    Highest education level: Not on file   Occupational History    Not on file   Social Needs    Financial resource strain: Not on file    Food insecurity     Worry: Not on file     Inability: Not on file   [employer] needs     Medical: Not on file     Non-medical: Not on file   Tobacco Use    Smoking status: Former Smoker    Smokeless tobacco: Never Used   Substance and Sexual Activity    Alcohol use: Yes     Frequency: 2-4 times a month    Drug use: No    Sexual activity: Not Currently     Partners: Male   Lifestyle    Physical activity     Days per week: Not on file     Minutes per session: Not on file    Stress: Not on file   Relationships    Social connections     Talks on phone: Not on file     Gets together: Not on file     Attends [employer] service: Not on file     Active member of club or organization: Not on file     Attends meetings of clubs or organizations: Not on file     Relationship status: Not on file    Intimate partner violence     Fear of current or ex partner: Not on file     Emotionally abused: Not on file     Physically abused: Not on file     Forced sexual activity: Not on file   Other Topics Concern    Not on file   Social History Narrative    Pt states she has coffee daily     No Known Allergies    Current Outpatient Medications:     ascorbic acid (VITAMIN C) 500 mg tablet, Take 500 mg by mouth daily, Disp: , Rfl:     Calcium Carbonate (CALTRATE 600) 1500 (600 Ca) MG TABS, Take by mouth, Disp: , Rfl:     Ascorbic Acid (VITAMIN C) 500 MG/5ML LIQD, Take 1 tablet by mouth daily, Disp: , Rfl:     Cholecalciferol (VITAMIN D3) 1000 units CAPS, Take by mouth, Disp: , Rfl:    menthol-zinc oxide (CALMOSEPTINE) 0 44-20 625 %, Apply topically 2 (two) times a day (Patient not taking: Reported on 12/11/2019), Disp: 1 Tube, Rfl: 0    valACYclovir (VALTREX) 1,000 mg tablet, Take 1 tablet (1,000 mg total) by mouth 3 (three) times a day for 7 days, Disp: 21 tablet, Rfl: 0    Review of Systems:    Review of Systems  Constitutional :no fever, feels well, no tiredness, no recent weight gain or loss  ENT: no ear ache, no loss of hearing, no nosebleeds or nasal discharge, no sore throat or hoarseness  Cardiovascular: no complaints of slow or fast heart beat, no chest pain, no palpitations, no leg claudication or lower extremity edema  Respiratory: no complaints of shortness of shortness of breath, no HOWARD  Breasts:no complaints of breast pain, breast lump, or nipple discharge  Gastrointestinal: no complaints of abdominal pain, constipation, nausea, vomiting, or diarrhea or bloody stools  Genitourinary : no complaints of dysuria, incontinence, pelvic pain, no dysmenorrhea, vaginal discharge or abnormal vaginal bleeding and as noted in HPI  Musculoskeletal: no complaints of arthralgia, no myalgia, no joint swelling or stiffness, no limb pain or swelling    Integumentary: no complaints of skin rash or lesion, itching or dry skin  Neurological: no complaints of headache, no confusion, no numbness or tingling, no dizziness or fainting    Objective      /72   Wt 69 5 kg (153 lb 3 2 oz)   LMP 01/12/2021   BMI 24 73 kg/m²     General:   appears stated age, cooperative, alert normal mood and affect   Neck: normal, supple,trachea midline, no masses   Heart: regular rate and rhythm, S1, S2 normal, no murmur, click, rub or gallop   Lungs: clear to auscultation bilaterally   Breasts: normal appearance, no masses or tenderness, Inspection negative, No nipple retraction or dimpling, No nipple discharge or bleeding, No axillary or supraclavicular adenopathy, Normal to palpation without dominant masses Abdomen: soft, non-tender, without masses or organomegaly   Vulva: normal female genitalia, Bartholin's, Urethra, [employer] normal, no lesions, normal female hair distribution   Vagina: normal vagina, no discharge, exudate, lesion, or erythema   Urethra: normal   Cervix: Normal, no discharge  PAP done  Uterus: normal size, contour, position, consistency, mobility, non-tender   Adnexa: normal adnexa and no mass, fullness, tenderness   Lymphatic palpation of lymph nodes in neck, axilla, groin and/or other locations: no lymphadenopathy or masses noted   Skin normal skin turgor and no rashes     Psychiatric orientation to person, place, and time: normal  mood and affect: normal

## 2021-01-23 LAB
LAB AP GYN PRIMARY INTERPRETATION: NORMAL
Lab: NORMAL

## 2021-01-28 ENCOUNTER — IMMUNIZATIONS (OUTPATIENT)
Dept: FAMILY MEDICINE CLINIC | Facility: HOSPITAL | Age: 46
End: 2021-01-28

## 2021-01-28 DIAGNOSIS — Z23 ENCOUNTER FOR IMMUNIZATION: Primary | ICD-10-CM

## 2021-01-28 PROCEDURE — 0012A SARS-COV-2 / COVID-19 MRNA VACCINE (MODERNA) 100 MCG: CPT

## 2021-01-28 PROCEDURE — 91301 SARS-COV-2 / COVID-19 MRNA VACCINE (MODERNA) 100 MCG: CPT

## 2021-03-07 ENCOUNTER — OFFICE VISIT (OUTPATIENT)
Dept: URGENT CARE | Facility: MEDICAL CENTER | Age: 46
End: 2021-03-07
Payer: COMMERCIAL

## 2021-03-07 VITALS
SYSTOLIC BLOOD PRESSURE: 135 MMHG | HEART RATE: 75 BPM | RESPIRATION RATE: 18 BRPM | TEMPERATURE: 97.9 F | OXYGEN SATURATION: 97 % | DIASTOLIC BLOOD PRESSURE: 79 MMHG
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
## 2021-03-07 PROCEDURE — 90471 IMMUNIZATION ADMIN: CPT | Performed by: PHYSICIAN ASSISTANT

## 2021-03-07 PROCEDURE — G0382 LEV 3 HOSP TYPE B ED VISIT: HCPCS | Performed by: PHYSICIAN ASSISTANT

## 2021-03-07 PROCEDURE — 90715 TDAP VACCINE 7 YRS/> IM: CPT

## 2021-03-07 NOTE — PROGRESS NOTES
[facility]        NAME: Truman Carreon is a 50 y o  female  : 1975    MRN: [id number]  DATE: 2021  TIME: 6:14 PM    Assessment and Plan   Cat scratch [[id number]]  1  Cat scratch  TDAP Vaccine greater than or equal to 8yo         Patient Instructions         Keep wound clean and dry  Discussed signs of infection and follow up p r n  Guru Santoyo Chief Complaint     Chief Complaint   Patient presents with    Puncture Wound     Patient presents with a puncture on her left hand after getting scratched from a cat today  She notes that the cat had previous rabies vaccines but may not be up to date  History of Present Illness        Patient was scratched by her aunt's CaT   On her non dominant left hand  This happened earlier today  She denies any issues  No fever chills  No redness or drainage  Her tetanus is not up-to-date  Review of Systems   Review of Systems   All other systems reviewed and are negative          Current Medications       Current Outpatient Medications:     ascorbic acid (VITAMIN C) 500 mg tablet, Take 500 mg by mouth daily, Disp: , Rfl:     Calcium Carbonate (CALTRATE 600) 1500 (600 Ca) MG TABS, Take by mouth, Disp: , Rfl:     Ascorbic Acid (VITAMIN C) 500 MG/5ML LIQD, Take 1 tablet by mouth daily, Disp: , Rfl:     Cholecalciferol (VITAMIN D3) 1000 units CAPS, Take by mouth, Disp: , Rfl:     menthol-zinc oxide (CALMOSEPTINE) 0 44-20 625 %, Apply topically 2 (two) times a day (Patient not taking: Reported on 2019), Disp: 1 Tube, Rfl: 0    valACYclovir (VALTREX) 1,000 mg tablet, Take 1 tablet (1,000 mg total) by mouth 3 (three) times a day for 7 days, Disp: 21 tablet, Rfl: 0    Current Allergies     Allergies as of 2021    (No Known Allergies)            The following portions of the patient's history were reviewed and updated as appropriate: allergies, current medications, past family history, past medical history, past social history, past surgical history and problem list      Past Medical History:   Diagnosis Date    Anxiety     Back pain     CVA (cerebral vascular accident) ([address] )     Depression     Hypertension     Palpitation     TIA (transient ischemic attack)     Ventral hernia        History reviewed  No pertinent surgical history  Family History   Problem Relation Age of Onset    No Known Problems Mother     Prostate cancer Father 79    No Known Problems Maternal Grandmother     No Known Problems Maternal Grandfather     No Known Problems Paternal Grandmother     No Known Problems Paternal Grandfather     No Known Problems Maternal Aunt     No Known Problems Paternal Aunt          Medications have been verified  Objective   /79   Pulse 75   Temp 97 9 °F (36 6 °C) (Tympanic)   Resp 18   SpO2 97%   No LMP recorded  Physical Exam     Physical Exam  Vitals signs and nursing note reviewed  Constitutional:       Appearance: Normal appearance  She is normal weight  Cardiovascular:      Rate and Rhythm: Normal rate and regular rhythm  Pulmonary:      Effort: Pulmonary effort is normal       Breath sounds: Normal breath sounds  Neurological:      Mental Status: She is alert          left hand small scratch on the dorsum of the hand from the  CaT on the ulnar side

## 2021-03-11 ENCOUNTER — TELEPHONE (OUTPATIENT)
Dept: FAMILY MEDICINE CLINIC | Facility: CLINIC | Age: 46
End: 2021-03-11

## 2021-03-29 ENCOUNTER — OFFICE VISIT (OUTPATIENT)
Dept: FAMILY MEDICINE CLINIC | Facility: CLINIC | Age: 46
End: 2021-03-29
Payer: COMMERCIAL

## 2021-03-29 VITALS
HEIGHT: 66 IN | TEMPERATURE: 97.4 F | HEART RATE: 83 BPM | WEIGHT: 154 LBS | BODY MASS INDEX: 24.75 KG/M2 | OXYGEN SATURATION: 98 % | DIASTOLIC BLOOD PRESSURE: 80 MMHG | SYSTOLIC BLOOD PRESSURE: 120 MMHG

## 2021-03-29 DIAGNOSIS — Z00.00 ANNUAL PHYSICAL EXAM: ICD-10-CM

## 2021-03-29 DIAGNOSIS — Z13.89 SCREENING FOR GENITOURINARY CONDITION: ICD-10-CM

## 2021-03-29 DIAGNOSIS — Z13.1 SCREENING FOR DIABETES MELLITUS: Primary | ICD-10-CM

## 2021-03-29 DIAGNOSIS — Z13.220 SCREENING, LIPID: ICD-10-CM

## 2021-03-29 DIAGNOSIS — Z13.220 LIPID SCREENING: ICD-10-CM

## 2021-03-29 DIAGNOSIS — Z13.29 SCREENING FOR THYROID DISORDER: ICD-10-CM

## 2021-03-29 DIAGNOSIS — Z13.21 ENCOUNTER FOR VITAMIN DEFICIENCY SCREENING: ICD-10-CM

## 2021-03-29 PROCEDURE — 99396 PREV VISIT EST AGE 40-64: CPT | Performed by: INTERNAL MEDICINE

## 2021-03-29 NOTE — PROGRESS NOTES
Assessment/Plan:         Diagnoses and all orders for this visit:      Annual physical exam    Screening for thyroid disorder  -     TSH, 3rd generation with Free T4 reflex    Screening, lipid    Encounter for vitamin deficiency screening  -     Vitamin D 25 hydroxy    Screening for genitourinary condition  -     Urinalysis with microscopic    Lipid screening  -     Lipid panel      Screening for diabetes mellitus  -     CBC and differential  -     Comprehensive metabolic panel    Subjective:      Patient ID: Josh Bridges is a 50 y o  female  HPI  Is here for annual physical   Lab studies are due  She is up-to-date with mammogram   She is in good health otherwise  The following portions of the patient's history were reviewed and updated as appropriate: allergies, current medications, past family history, past medical history, past social history, past surgical history and problem list       Review of Systems   Constitutional: Negative for appetite change, chills, fatigue, fever and unexpected weight change  HENT: Negative for congestion, hearing loss, postnasal drip, trouble swallowing and voice change  Eyes: Negative for pain and visual disturbance  Respiratory: Negative for cough, chest tightness and shortness of breath  Cardiovascular: Negative for chest pain, palpitations and leg swelling  Gastrointestinal: Negative for abdominal pain, blood in stool, constipation, diarrhea, nausea and vomiting  Endocrine: Negative for cold intolerance, heat intolerance, polydipsia and polyphagia  Genitourinary: Negative for difficulty urinating, flank pain, frequency and hematuria  Musculoskeletal: Negative for arthralgias, back pain, gait problem, joint swelling and myalgias  Skin: Negative for rash  Neurological: Negative for dizziness, weakness, light-headedness, numbness and headaches  Hematological: Negative for adenopathy  Does not bruise/bleed easily     Psychiatric/Behavioral: Negative for confusion, dysphoric mood and sleep disturbance  Objective:      /80 (BP Location: Left arm, Patient Position: Sitting, Cuff Size: Standard)   Pulse 83   Temp (!) 97 4 °F (36 3 °C)   Ht 5' 6" (1 676 m)   Wt 69 9 kg (154 lb)   SpO2 98%   BMI 24 86 kg/m²          Physical Exam  Constitutional:       General: She is not in acute distress  Appearance: She is well-developed  She is not ill-appearing  HENT:      Head: Normocephalic  Mouth/Throat:      Pharynx: No oropharyngeal exudate  Eyes:      General: No scleral icterus  Pupils: Pupils are equal, round, and reactive to light  Neck:      Musculoskeletal: No muscular tenderness  Thyroid: No thyromegaly  Cardiovascular:      Rate and Rhythm: Normal rate and regular rhythm  Heart sounds: Normal heart sounds  No murmur  Pulmonary:      Effort: Pulmonary effort is normal  No respiratory distress  Breath sounds: Normal breath sounds  No wheezing or rales  Abdominal:      General: Bowel sounds are normal  There is no distension  Palpations: Abdomen is soft  There is no mass  Tenderness: There is no abdominal tenderness  There is no guarding or rebound  Hernia: No hernia is present  Musculoskeletal:      Right lower leg: No edema  Left lower leg: No edema  Lymphadenopathy:      Cervical: No cervical adenopathy  Skin:     General: Skin is warm  Neurological:      Mental Status: She is alert and oriented to person, place, and time  Cranial Nerves: No cranial nerve deficit  Coordination: Coordination normal       Gait: Gait normal       Deep Tendon Reflexes: Reflexes are normal and symmetric  Psychiatric:         Mood and Affect: Mood normal          Behavior: Behavior normal          Thought Content:  Thought content normal          Judgment: Judgment normal

## 2021-03-31 ENCOUNTER — APPOINTMENT (OUTPATIENT)
Dept: LAB | Facility: MEDICAL CENTER | Age: 46
End: 2021-03-31
Payer: COMMERCIAL

## 2021-03-31 LAB
25(OH)D3 SERPL-MCNC: 24.6 NG/ML (ref 30–100)
ALBUMIN SERPL BCP-MCNC: 3.7 G/DL (ref 3.5–5)
ALP SERPL-CCNC: 55 U/L (ref 46–116)
ALT SERPL W P-5'-P-CCNC: 19 U/L (ref 12–78)
ANION GAP SERPL CALCULATED.3IONS-SCNC: 3 MMOL/L (ref 4–13)
AST SERPL W P-5'-P-CCNC: 10 U/L (ref 5–45)
BACTERIA UR QL AUTO: NORMAL /HPF
BASOPHILS # BLD AUTO: 0.04 THOUSANDS/ΜL (ref 0–0.1)
BASOPHILS NFR BLD AUTO: 1 % (ref 0–1)
BILIRUB SERPL-MCNC: 0.74 MG/DL (ref 0.2–1)
BILIRUB UR QL STRIP: NEGATIVE
BUN SERPL-MCNC: 16 MG/DL (ref 5–25)
CALCIUM SERPL-MCNC: 8.5 MG/DL (ref 8.3–10.1)
CHLORIDE SERPL-SCNC: 110 MMOL/L (ref 100–108)
CHOLEST SERPL-MCNC: 234 MG/DL (ref 50–200)
CLARITY UR: NORMAL
CO2 SERPL-SCNC: 25 MMOL/L (ref 21–32)
COLOR UR: YELLOW
CREAT SERPL-MCNC: 0.86 MG/DL (ref 0.6–1.3)
EOSINOPHIL # BLD AUTO: 0.24 THOUSAND/ΜL (ref 0–0.61)
EOSINOPHIL NFR BLD AUTO: 3 % (ref 0–6)
ERYTHROCYTE [DISTWIDTH] IN BLOOD BY AUTOMATED COUNT: 13.2 % (ref 11.6–15.1)
GFR SERPL CREATININE-BSD FRML MDRD: 81 ML/MIN/1.73SQ M
GLUCOSE P FAST SERPL-MCNC: 89 MG/DL (ref 65–99)
GLUCOSE UR STRIP-MCNC: NEGATIVE MG/DL
HCT VFR BLD AUTO: 41.1 % (ref 34.8–46.1)
HDLC SERPL-MCNC: 54 MG/DL
HGB BLD-MCNC: 13.4 G/DL (ref 11.5–15.4)
HGB UR QL STRIP.AUTO: NEGATIVE
IMM GRANULOCYTES # BLD AUTO: 0.02 THOUSAND/UL (ref 0–0.2)
IMM GRANULOCYTES NFR BLD AUTO: 0 % (ref 0–2)
KETONES UR STRIP-MCNC: NEGATIVE MG/DL
LDLC SERPL CALC-MCNC: 150 MG/DL (ref 0–100)
LEUKOCYTE ESTERASE UR QL STRIP: NEGATIVE
LYMPHOCYTES # BLD AUTO: 1.81 THOUSANDS/ΜL (ref 0.6–4.47)
LYMPHOCYTES NFR BLD AUTO: 22 % (ref 14–44)
MCH RBC QN AUTO: 29.3 PG (ref 26.8–34.3)
MCHC RBC AUTO-ENTMCNC: 32.6 G/DL (ref 31.4–37.4)
MCV RBC AUTO: 90 FL (ref 82–98)
MONOCYTES # BLD AUTO: 0.52 THOUSAND/ΜL (ref 0.17–1.22)
MONOCYTES NFR BLD AUTO: 6 % (ref 4–12)
NEUTROPHILS # BLD AUTO: 5.56 THOUSANDS/ΜL (ref 1.85–7.62)
NEUTS SEG NFR BLD AUTO: 68 % (ref 43–75)
NITRITE UR QL STRIP: NEGATIVE
NON-SQ EPI CELLS URNS QL MICRO: NORMAL /HPF
NONHDLC SERPL-MCNC: 180 MG/DL
NRBC BLD AUTO-RTO: 0 /100 WBCS
PH UR STRIP.AUTO: 5.5 [PH]
PLATELET # BLD AUTO: 181 THOUSANDS/UL (ref 149–390)
PMV BLD AUTO: 10.6 FL (ref 8.9–12.7)
POTASSIUM SERPL-SCNC: 4 MMOL/L (ref 3.5–5.3)
PROT SERPL-MCNC: 7.1 G/DL (ref 6.4–8.2)
PROT UR STRIP-MCNC: NEGATIVE MG/DL
RBC # BLD AUTO: 4.58 MILLION/UL (ref 3.81–5.12)
RBC #/AREA URNS AUTO: NORMAL /HPF
SODIUM SERPL-SCNC: 138 MMOL/L (ref 136–145)
SP GR UR STRIP.AUTO: 1.02 (ref 1–1.03)
TRIGL SERPL-MCNC: 151 MG/DL
TSH SERPL DL<=0.05 MIU/L-ACNC: 1.2 UIU/ML (ref 0.36–3.74)
UROBILINOGEN UR QL STRIP.AUTO: 0.2 E.U./DL
WBC # BLD AUTO: 8.19 THOUSAND/UL (ref 4.31–10.16)
WBC #/AREA URNS AUTO: NORMAL /HPF

## 2021-03-31 PROCEDURE — 81001 URINALYSIS AUTO W/SCOPE: CPT | Performed by: INTERNAL MEDICINE

## 2021-03-31 PROCEDURE — 84443 ASSAY THYROID STIM HORMONE: CPT | Performed by: INTERNAL MEDICINE

## 2021-03-31 PROCEDURE — 80053 COMPREHEN METABOLIC PANEL: CPT | Performed by: INTERNAL MEDICINE

## 2021-03-31 PROCEDURE — 36415 COLL VENOUS BLD VENIPUNCTURE: CPT | Performed by: INTERNAL MEDICINE

## 2021-03-31 PROCEDURE — 80061 LIPID PANEL: CPT | Performed by: INTERNAL MEDICINE

## 2021-03-31 PROCEDURE — 85025 COMPLETE CBC W/AUTO DIFF WBC: CPT | Performed by: INTERNAL MEDICINE

## 2021-03-31 PROCEDURE — 82306 VITAMIN D 25 HYDROXY: CPT | Performed by: INTERNAL MEDICINE

## 2021-04-01 ENCOUNTER — TELEPHONE (OUTPATIENT)
Dept: FAMILY MEDICINE CLINIC | Facility: CLINIC | Age: 46
End: 2021-04-01

## 2021-04-01 NOTE — TELEPHONE ENCOUNTER
----- Message from Suzan Onofre MD sent at 3/31/2021  4:06 PM EDT -----  Cholesterol is high; Diet modification would repeat lab studies in 6-12 months   vitamin-D is low over-the-counter 2000 a day

## 2021-04-06 DIAGNOSIS — Z13.1 SCREENING FOR DIABETES MELLITUS: Primary | ICD-10-CM

## 2021-04-29 ENCOUNTER — TRANSCRIBE ORDERS (OUTPATIENT)
Dept: ADMINISTRATIVE | Facility: HOSPITAL | Age: 46
End: 2021-04-29

## 2021-04-29 ENCOUNTER — APPOINTMENT (OUTPATIENT)
Dept: LAB | Facility: MEDICAL CENTER | Age: 46
End: 2021-04-29

## 2021-04-29 DIAGNOSIS — Z00.8 HEALTH EXAMINATION IN POPULATION SURVEY: Primary | ICD-10-CM

## 2021-04-29 DIAGNOSIS — Z00.8 HEALTH EXAMINATION IN POPULATION SURVEY: ICD-10-CM

## 2021-04-29 LAB
EST. AVERAGE GLUCOSE BLD GHB EST-MCNC: 103 MG/DL
HBA1C MFR BLD: 5.2 %

## 2021-04-29 PROCEDURE — 36415 COLL VENOUS BLD VENIPUNCTURE: CPT

## 2021-04-29 PROCEDURE — 83036 HEMOGLOBIN GLYCOSYLATED A1C: CPT

## 2021-09-09 ENCOUNTER — OFFICE VISIT (OUTPATIENT)
Dept: OBGYN CLINIC | Facility: CLINIC | Age: 46
End: 2021-09-09
Payer: COMMERCIAL

## 2021-09-09 VITALS
HEIGHT: 66 IN | SYSTOLIC BLOOD PRESSURE: 116 MMHG | DIASTOLIC BLOOD PRESSURE: 66 MMHG | WEIGHT: 153.6 LBS | BODY MASS INDEX: 24.68 KG/M2

## 2021-09-09 DIAGNOSIS — N63.0 BREAST MASS: Primary | ICD-10-CM

## 2021-09-09 DIAGNOSIS — N64.4 BREAST TENDERNESS: ICD-10-CM

## 2021-09-09 DIAGNOSIS — N63.0 BREAST SWELLING: ICD-10-CM

## 2021-09-09 PROCEDURE — 99213 OFFICE O/P EST LOW 20 MIN: CPT | Performed by: OBSTETRICS & GYNECOLOGY

## 2021-09-09 NOTE — PROGRESS NOTES
[facility] [address], [address], [place], [place]    Assessment/Plan:  No problem-specific Assessment & Plan notes found for this encounter  Diagnoses and all orders for this visit:    Breast mass  -     Mammo diagnostic left w 3d & cad; Future  -     US breast left limited (diagnostic); Future    Breast swelling  -     Mammo diagnostic left w 3d & cad; Future  -     US breast left limited (diagnostic); Future    Breast tenderness  -     Mammo diagnostic left w 3d & cad; Future  -     US breast left limited (diagnostic); Future          Subjective: Elsy Johnston is a 48 y o  [facility] female  CC: left breast pain    HPI: HPI  Patient presents with a 4 day history of left breast pain  States she woke up with the pain after sleeping  Denies trauma to the area  Denies nipple discharge  Reports pink to the area  States the left breast is bigger than the right  Does not notices masses  ROS: Review of Systems   Constitutional: Negative  HENT: Negative  Eyes: Negative  Respiratory: Negative  Cardiovascular: Negative  Gastrointestinal: Negative  Genitourinary: Negative  Musculoskeletal: Negative  All other systems reviewed and are negative  PFSH: The following portions of the patient's history were reviewed and updated as appropriate: allergies, current medications, past family history, past medical history, obstetric history, gynecologic history, past social history, past surgical history and problem list        Objective:  /66   Ht 5' 6" (1 676 m)   Wt 69 7 kg (153 lb 9 6 oz)   LMP 08/18/2021   Breastfeeding No   BMI 24 79 kg/m²    Physical Exam  Vitals reviewed  Constitutional:       Appearance: Normal appearance  Cardiovascular:      Rate and Rhythm: Normal rate  Pulmonary:      Effort: Pulmonary effort is normal  No respiratory distress  Chest:      Breasts:         Right: Normal          Left: Swelling, mass and tenderness present  No nipple discharge or skin change  Comments: 3 cystic areas within left breast  Lymphadenopathy:      Upper Body:      Right upper body: No supraclavicular, axillary or pectoral adenopathy  Left upper body: No supraclavicular, axillary or pectoral adenopathy  Neurological:      Mental Status: She is alert     Psychiatric:         Mood and Affect: Mood normal          Behavior: Behavior normal

## 2021-09-10 ENCOUNTER — HOSPITAL ENCOUNTER (OUTPATIENT)
Dept: ULTRASOUND IMAGING | Facility: CLINIC | Age: 46
Discharge: HOME | End: 2021-09-10
Payer: COMMERCIAL

## 2021-09-10 ENCOUNTER — HOSPITAL ENCOUNTER (OUTPATIENT)
Dept: MAMMOGRAPHY | Facility: CLINIC | Age: 46
Discharge: HOME | End: 2021-09-10
Payer: COMMERCIAL

## 2021-09-10 VITALS — WEIGHT: 153.6 LBS | BODY MASS INDEX: 24.68 KG/M2 | HEIGHT: 66 IN

## 2021-09-10 DIAGNOSIS — N64.4 BREAST TENDERNESS: ICD-10-CM

## 2021-09-10 DIAGNOSIS — N63.0 BREAST SWELLING: ICD-10-CM

## 2021-09-10 DIAGNOSIS — N63.0 BREAST MASS: ICD-10-CM

## 2021-09-10 PROCEDURE — 77065 DX MAMMO INCL CAD UNI: CPT

## 2021-09-10 PROCEDURE — 76642 ULTRASOUND BREAST LIMITED: CPT

## 2021-09-10 PROCEDURE — G0279 TOMOSYNTHESIS, MAMMO: HCPCS

## 2021-11-04 ENCOUNTER — IMMUNIZATIONS (OUTPATIENT)
Dept: FAMILY MEDICINE CLINIC | Facility: CLINIC | Age: 46
End: 2021-11-04

## 2021-11-04 DIAGNOSIS — Z23 ENCOUNTER FOR IMMUNIZATION: Primary | ICD-10-CM

## 2021-11-04 PROCEDURE — 91306 PR SARSCOV2 VAC 50MCG/0.25ML IM: CPT

## 2021-12-16 ENCOUNTER — HOSPITAL ENCOUNTER (OUTPATIENT)
Dept: RADIOLOGY | Age: 46
Discharge: HOME | End: 2021-12-16
Payer: COMMERCIAL

## 2021-12-16 VITALS — HEIGHT: 66 IN | WEIGHT: 153 LBS | BODY MASS INDEX: 24.59 KG/M2

## 2021-12-16 DIAGNOSIS — Z12.31 ENCOUNTER FOR SCREENING MAMMOGRAM FOR MALIGNANT NEOPLASM OF BREAST: ICD-10-CM

## 2021-12-16 PROCEDURE — 77063 BREAST TOMOSYNTHESIS BI: CPT

## 2021-12-16 PROCEDURE — 77067 SCR MAMMO BI INCL CAD: CPT

## 2022-01-10 ENCOUNTER — APPOINTMENT (OUTPATIENT)
Dept: RADIOLOGY | Facility: MEDICAL CENTER | Age: 47
End: 2022-01-10
Payer: COMMERCIAL

## 2022-01-10 ENCOUNTER — OFFICE VISIT (OUTPATIENT)
Dept: FAMILY MEDICINE CLINIC | Facility: CLINIC | Age: 47
End: 2022-01-10
Payer: COMMERCIAL

## 2022-01-10 VITALS
TEMPERATURE: 97.3 F | HEART RATE: 81 BPM | WEIGHT: 157.8 LBS | OXYGEN SATURATION: 97 % | BODY MASS INDEX: 25.36 KG/M2 | SYSTOLIC BLOOD PRESSURE: 130 MMHG | HEIGHT: 66 IN | DIASTOLIC BLOOD PRESSURE: 76 MMHG

## 2022-01-10 DIAGNOSIS — M54.2 NECK PAIN: ICD-10-CM

## 2022-01-10 DIAGNOSIS — M62.838 NECK MUSCLE SPASM: Primary | ICD-10-CM

## 2022-01-10 DIAGNOSIS — Z13.21 ENCOUNTER FOR VITAMIN DEFICIENCY SCREENING: ICD-10-CM

## 2022-01-10 DIAGNOSIS — M62.838 NECK MUSCLE SPASM: ICD-10-CM

## 2022-01-10 DIAGNOSIS — Z13.220 SCREENING, LIPID: ICD-10-CM

## 2022-01-10 DIAGNOSIS — Z12.11 COLON CANCER SCREENING: ICD-10-CM

## 2022-01-10 DIAGNOSIS — Z13.29 SCREENING FOR THYROID DISORDER: ICD-10-CM

## 2022-01-10 DIAGNOSIS — Z13.220 LIPID SCREENING: ICD-10-CM

## 2022-01-10 DIAGNOSIS — Z13.89 SCREENING FOR GENITOURINARY CONDITION: ICD-10-CM

## 2022-01-10 DIAGNOSIS — Z13.1 SCREENING FOR DIABETES MELLITUS: ICD-10-CM

## 2022-01-10 PROCEDURE — 99213 OFFICE O/P EST LOW 20 MIN: CPT | Performed by: INTERNAL MEDICINE

## 2022-01-10 PROCEDURE — 72050 X-RAY EXAM NECK SPINE 4/5VWS: CPT

## 2022-01-10 RX ORDER — CYCLOBENZAPRINE HCL 5 MG
5 TABLET ORAL 3 TIMES DAILY PRN
Qty: 20 TABLET | Refills: 0 | Status: SHIPPED | OUTPATIENT
Start: 2022-01-10 | End: 2022-01-19 | Stop reason: SDUPTHER

## 2022-01-10 NOTE — PROGRESS NOTES
Assessment/Plan:         Diagnoses and all orders for this visit:    Neck muscle spasm  -     XR spine cervical complete 4 or 5 vw non injury; Future  -     cyclobenzaprine (FLEXERIL) 5 mg tablet; Take 1 tablet (5 mg total) by mouth 3 (three) times a day as needed for muscle spasms  Warm compresses will be helpful  Patient would also use NSAID over-the-counter for the next several days  Caution advised use of muscle relaxer  Neck pain  -     XR spine cervical complete 4 or 5 vw non injury; Future  -     cyclobenzaprine (FLEXERIL) 5 mg tablet; Take 1 tablet (5 mg total) by mouth 3 (three) times a day as needed for muscle spasms        Subjective:      Patient ID: Anoop Flynn is a 48 y o  female  HPI  Patient is here with neck discomfort going on for the last 2 weeks  Small prominent the right side causing the pain to radiate down her right shoulder to her her right arm  She does not report numbness and tingling or weakness in the arm  She finds sleeping quite uncomfortable because the discomfort  No fevers and chills  Denies any trauma    The following portions of the patient's history were reviewed and updated as appropriate: allergies, current medications, past family history, past medical history, past social history, past surgical history and problem list     Review of Systems      Objective:      /76 (BP Location: Left arm, Patient Position: Sitting, Cuff Size: Standard)   Pulse 81   Temp (!) 97 3 °F (36 3 °C) (Tympanic)   Ht 5' 6" (1 676 m)   Wt 71 6 kg (157 lb 12 8 oz)   SpO2 97%   BMI 25 47 kg/m²          Physical Exam  Neck:      Comments: Mild right side trapezius muscle spasm  Mildly decreased range of motion  Good range of motion of the shoulder  Musculoskeletal:      Comments: Minimal leg length discrepancy with left leg slightly shorter than the right

## 2022-01-14 ENCOUNTER — OFFICE VISIT (OUTPATIENT)
Dept: PAIN MEDICINE | Facility: CLINIC | Age: 47
End: 2022-01-14
Payer: COMMERCIAL

## 2022-01-14 VITALS
DIASTOLIC BLOOD PRESSURE: 78 MMHG | WEIGHT: 160.6 LBS | SYSTOLIC BLOOD PRESSURE: 112 MMHG | HEIGHT: 66 IN | BODY MASS INDEX: 25.81 KG/M2

## 2022-01-14 DIAGNOSIS — M79.18 MYOFASCIAL PAIN SYNDROME: ICD-10-CM

## 2022-01-14 DIAGNOSIS — M54.2 NECK PAIN: Primary | ICD-10-CM

## 2022-01-14 DIAGNOSIS — M54.12 CERVICAL RADICULOPATHY: ICD-10-CM

## 2022-01-14 DIAGNOSIS — M54.2 ACUTE NECK PAIN: ICD-10-CM

## 2022-01-14 PROCEDURE — 99204 OFFICE O/P NEW MOD 45 MIN: CPT | Performed by: PHYSICAL MEDICINE & REHABILITATION

## 2022-01-14 RX ORDER — GABAPENTIN 300 MG/1
300 CAPSULE ORAL 3 TIMES DAILY
Qty: 90 CAPSULE | Refills: 2 | Status: SHIPPED | OUTPATIENT
Start: 2022-01-14 | End: 2022-02-11

## 2022-01-14 NOTE — PROGRESS NOTES
Assessment:  1  Neck pain    2  Cervical radiculopathy    3  Myofascial pain syndrome    4  Acute neck pain        Plan:    Ms Sita Garcia is a pleasant 49-year-old female who presents for initial evaluation regarding 2 weeks duration of isolated right sided neck, shoulder pain with radiating symptoms into the posterior right arm  Patient denies any significant inciting event or recent trauma  During today's evaluation she is demonstrating clinical evidence of suspected myofascial pain syndrome and cervical radiculopathy in the C8 and T1 dermatomal distribution  She has not tried any conservative therapy and was just placed on meloxicam and Flexeril yesterday  At this time we will   1  Place the patient in formal physical therapy program x4 weeks or longer if needed  2  Will start the patient on gabapentin 300 milligrams at night and can titrate up to 3 times a day as tolerated and necessary  3  She was just started on Flexeril and meloxicam so I would not make any adjustments at this time  If the meloxicam does not provide any significant relief may consider switching to diclofenac 75 milligrams twice a day to better control acute inflammation  4  Will hold off on interventional approaches at this time but the patient may benefit from trigger point injections versus cervical epidural steroid injection at a later date pending her response to more conservative measures with physical therapy  Will see the patient back in 4 weeks or sooner if needed    My impressions and treatment recommendations were discussed in detail with the patient, who verbalized understanding and had no further questions  History of Present Illness: Anoop Flynn is a 48 y o  female who presents to [facility] for initial evaluation of the above stated pain complaints    Today patient reports 2 weeks duration of neck, mid back pain with radiating symptoms into the right upper extremity currently rated 6/10 and interfering with daily activities  Pain is described as a moderate pressure-like, dull ache, throbbing sensation that is nearly constant 60-95 percent of the time and present throughout the night and day  Denies any significant weakness or falls or dropping objects  Does not use any durable medical equipment for ambulation  Symptoms are worse with lying down, standing, sitting  Has had no significant relief with heat  Currently taking Motrin which provides minimal relief  Presents for initial evaluation  Review of Systems:    Review of Systems   Constitutional: Negative for fever and unexpected weight change  HENT: Negative for trouble swallowing  Eyes: Negative for visual disturbance  Respiratory: Negative for shortness of breath and wheezing  Cardiovascular: Negative for chest pain and palpitations  Gastrointestinal: Negative for constipation, diarrhea, nausea and vomiting  Endocrine: Negative for cold intolerance, heat intolerance and polydipsia  Genitourinary: Negative for difficulty urinating and frequency  Musculoskeletal: Negative for arthralgias, gait problem, joint swelling and myalgias  Skin: Negative for rash  Neurological: Negative for dizziness, seizures, syncope, weakness and headaches  Hematological: Does not bruise/bleed easily  Psychiatric/Behavioral: Negative for dysphoric mood  All other systems reviewed and are negative  There is no problem list on file for this patient  Past Medical History:   Diagnosis Date    Anxiety     Back pain     CVA [facility]cerebral vascular accident) ([facility] [address] )     Depression     Hypertension     Palpitation     TIA (transient ischemic attack)     Ventral hernia        History reviewed  No pertinent surgical history      Family History   Problem Relation Age of Onset    No Known Problems Mother     Prostate cancer Father 79    No Known Problems Maternal Grandmother     No Known Problems Maternal Grandfather     No Known Problems Paternal Grandmother     No Known Problems Paternal Grandfather     No Known Problems Maternal Aunt     No Known Problems Paternal Aunt     BRCA2 Negative Neg Hx     BRCA2 Positive Neg Hx     BRCA1 Positive Neg Hx     BRCA1 Negative Neg Hx     BRCA 1/2 Neg Hx     Ovarian cancer Neg Hx     Endometrial cancer Neg Hx     Colon cancer Neg Hx     Breast cancer additional onset Neg Hx     Breast cancer Neg Hx        Social History     Occupational History    Not on file   Tobacco Use    Smoking status: Former Smoker    Smokeless tobacco: Never Used   Vaping Use    Vaping Use: Never used   Substance and Sexual Activity    Alcohol use: Yes    Drug use: No    Sexual activity: Not Currently     Partners: Male         Current Outpatient Medications:     ascorbic acid (VITAMIN C) 500 mg tablet, Take 500 mg by mouth daily, Disp: , Rfl:     Calcium Carbonate (CALTRATE 600) 1500 (600 Ca) MG TABS, Take by mouth, Disp: , Rfl:     Cholecalciferol (VITAMIN D3) 1000 units CAPS, Take by mouth, Disp: , Rfl:     meloxicam (Mobic) 7 5 mg tablet, Take 1 tablet (7 5 mg total) by mouth daily, Disp: 30 tablet, Rfl: 0    cyclobenzaprine (FLEXERIL) 5 mg tablet, Take 1 tablet (5 mg total) by mouth 3 (three) times a day as needed for muscle spasms (Patient not taking: Reported on 1/14/2022 ), Disp: 20 tablet, Rfl: 0    gabapentin (NEURONTIN) 300 mg capsule, Take 1 capsule (300 mg total) by mouth 3 (three) times a day, Disp: 90 capsule, Rfl: 2    No Known Allergies    Physical Exam:    /78   Ht 5' 6" (1 676 m)   Wt 72 8 kg (160 lb 9 6 oz)   BMI 25 92 kg/m²     Constitutional: normal, well developed, well nourished, alert, in no distress and non-toxic and no overt pain behavior    Eyes: anicteric  HEENT: grossly intact  Neck: supple, symmetric, trachea midline and no masses   Pulmonary:even and unlabored  Cardiovascular:No edema or pitting edema present  Skin:Normal without rashes or lesions and well hydrated  Psychiatric:Mood and affect appropriate  Neurologic:Cranial Nerves II-XII grossly intact  Musculoskeletal:normal, tenderness to palpation right-sided cervical paraspinals, decreased active and passive range of motion with cervical flexion, extension, right side bending limited by pain, MMT 5/5 bilateral upper extremities, sensation grossly intact to light touch, DTRs within normal limits, positive Spurling with radicular symptoms in the posterior right arm    Imaging  XR Cervical Spine    Study Result    Narrative & Impression   CERVICAL SPINE     INDICATION:   F28 [id number]: Other muscle spasm  M54 2: Cervicalgia      COMPARISON:  None     VIEWS:  XR SPINE CERVICAL COMPLETE 4 OR 5 VW NON INJURY         FINDINGS:     No fracture       Anatomic alignment      C5-C7 vertebral body anterosuperior chronic slight wedge deformity        C5-6 mild, C6-C7 mild/moderate disc space narrowing    C4-C5 through C6-C7 anterior degenerative change       Right C3-C4, C4-C5, C6-C7 mild neuroforamen narrowing      The prevertebral soft tissues are within normal limits        The lung apices are clear      IMPRESSION:     Mild degenerative changes        Workstation performed: [id number]

## 2022-01-14 NOTE — PATIENT INSTRUCTIONS
Trigger Point Pain   WHAT YOU NEED TO KNOW:   A trigger point is a tight, tender lump in your muscle  Trigger points may form after muscle injury, overuse, stress, or tension  Muscle stress may be from poor posture or an awkward sleep position  Emotional stress can make you tense certain muscles, such as in your neck  DISCHARGE INSTRUCTIONS:   Call your doctor or pain specialist if:   · You have new or worsening pain around the trigger point  · You have questions or concerns about your condition or care  Medicines: You may need any of the following:  · NSAIDs , such as ibuprofen, help decrease swelling, pain, and fever  NSAIDs can cause stomach bleeding or kidney problems in certain people  If you take blood thinner medicine, always ask your healthcare provider if NSAIDs are safe for you  Always read the medicine label and follow directions  · Prescription pain medicine  may be given  Ask your healthcare provider how to take this medicine safely  Some prescription pain medicines contain acetaminophen  Do not take other medicines that contain acetaminophen without talking to your healthcare provider  Too much acetaminophen may cause liver damage  Prescription pain medicine may cause constipation  Ask your healthcare provider how to prevent or treat constipation  · Medicine  may be given to help your muscles relax  · Take your medicine as directed  Contact your healthcare provider if you think your medicine is not helping or if you have side effects  Tell him or her if you are allergic to any medicine  Keep a list of the medicines, vitamins, and herbs you take  Include the amounts, and when and why you take them  Bring the list or the pill bottles to follow-up visits  Carry your medicine list with you in case of an emergency  Manage trigger point pain:   · Do regular stretches of the trigger point muscle  Place gentle pressure on the trigger point, and then stretch the muscle   Ask your healthcare provider for more information about how to stretch and apply pressure  · Apply heat to trigger point sites  Heat can help relax muscles and relieve trigger point pain  Use a heat pack or a heating pad set on low  Apply heat for 15 minutes every hour, or as directed  Follow up with your doctor or pain specialist as directed:  Write down your questions so you remember to ask them during your visits  © Copyright [employer] 2021 Information is for End User's use only and may not be sold, redistributed or otherwise used for commercial purposes  All illustrations and images included in CareNotes® are the copyrighted property of [employer]  or [address] [place], [place] [address]rosalino Mckoy   The above information is an  only  It is not intended as medical advice for individual conditions or treatments  Talk to your doctor, nurse or pharmacist before following any medical regimen to see if it is safe and effective for you

## 2022-01-17 ENCOUNTER — EVALUATION (OUTPATIENT)
Dept: PHYSICAL THERAPY | Facility: CLINIC | Age: 47
End: 2022-01-17
Payer: COMMERCIAL

## 2022-01-17 DIAGNOSIS — M54.2 ACUTE NECK PAIN: ICD-10-CM

## 2022-01-17 DIAGNOSIS — M54.2 NECK PAIN: ICD-10-CM

## 2022-01-17 DIAGNOSIS — M79.18 MYOFASCIAL PAIN SYNDROME: ICD-10-CM

## 2022-01-17 DIAGNOSIS — M54.12 CERVICAL RADICULOPATHY: ICD-10-CM

## 2022-01-17 PROCEDURE — 97162 PT EVAL MOD COMPLEX 30 MIN: CPT | Performed by: PHYSICAL THERAPIST

## 2022-01-17 PROCEDURE — 97112 NEUROMUSCULAR REEDUCATION: CPT | Performed by: PHYSICAL THERAPIST

## 2022-01-17 NOTE — PROGRESS NOTES
PT Evaluation     Today's date: 2022  Patient name: Vinod Layton  : 1975  MRN: [id number]  Referring provider: Kan Zuniga DO  Dx:   Encounter Diagnosis     ICD-10-CM    1  Cervical radiculopathy  M54 12 Ambulatory referral to Physical Therapy   2  Myofascial pain syndrome  M79 18 Ambulatory referral to Physical Therapy   3  Neck pain  M54 2 Ambulatory referral to Physical Therapy   4  Acute neck pain  M54 2 Ambulatory referral to Physical Therapy                  Assessment  Assessment details: 48year old female patient reports to PT with acute R sided neck pain and R UE radiating pain  No red flags noted and UQS was unremarkable other than slight decrease in elbow extension strength  Patient seems to have directional preference for cervical extension as patient post cervical extension based exercises had some centralization  Patient symptoms also improved with posture correction  Patient will benefit from skilled PT services to address current impairments and functional limitations to help patient return to her PLOF  Impairments: abnormal movement, activity intolerance and pain with function    Symptom irritability: moderateUnderstanding of Dx/Px/POC: good   Prognosis: good    Goals  STG  1  Patient will be independent with completion of HEP throughout therapy  2  Patient will have at worst 6/10 pain so patient can sleep with less discomfort in 2 weeks  LTG  1  Patient will not have any discomfort turning her head to the right in 3 weeks  2  Patient will complete full day of work without increase in symptoms in 3 weeks       Plan  Patient would benefit from: skilled physical therapy  Planned therapy interventions: abdominal trunk stabilization, activity modification, joint mobilization, manual therapy, neuromuscular re-education, patient education, strengthening, stretching, therapeutic activities, therapeutic exercise, home exercise program, functional ROM exercises and flexibility  Frequency: 2x week  Duration in weeks: 6  Treatment plan discussed with: patient        Subjective Evaluation    History of Present Illness  Mechanism of injury: Patient reports with R sided neck pain and R UE symptoms that go all the way down her arm  Patient denies numbness/tingling  Patient notes it's just pain going down her UE  Patient notes insidious onset of these symptoms  Patient denies progressive weakness  Patient has difficulty sleeping unless she is on her stomach and her R UE is above her head  Patient notes her pain is improving but did start using medications last week that she was prescribed  Patient notes some difficulty turning her head to the right  Patient also notes some difficulty reaching for her mouse, which affects her work  Patient also has to take breaks to get up and move  Pain  Current pain ratin  At best pain rating: 3  At worst pain ratin  Quality: dull ache and radiating  Relieving factors: change in position and medications    Treatments  Current treatment: physical therapy  Patient Goals  Patient goals for therapy: decreased pain, increased motion and return to sport/leisure activities          Objective     Concurrent Complaints  Positive for disturbed sleep   Negative for night pain, dizziness, faints, headaches and nausea/motion sickness    Neurological Testing     Sensation   Cervical/Thoracic   Left   Intact: light touch    Right   Intact: light touch    Reflexes   Left   Biceps (C5/C6): normal (2+)  Brachioradialis (C6): normal (2+)  Triceps (C7): normal (2+)  Keith's reflex: negative    Right   Biceps (C5/C6): normal (2+)  Brachioradialis (C6): normal (2+)  Triceps (C7): normal (2+)  Keith's reflex: negative    Active Range of Motion   Cervical/Thoracic Spine       Cervical    Flexion:  WFL  Extension:  with pain Restriction level: minimal  Left lateral flexion:  WFL  Right lateral flexion:  with pain Restriction level minimal  Left rotation: WFL  Right rotation:  WFL and with pain    Passive Range of Motion   Cervical/Thoracic Spine   Cervical     Left lateral flexion (degrees):  WFL  Right lateral flexion (degrees):  WFL  Left rotation (degrees):  WFL  Right rotation (degrees):  WFL    Additional Passive Range of Motion Details  L cervical lateral flexion and flexion PROM reproduced patient's R UE symptoms  Mechanical Assessment    Cervical    Seated retraction: repeated movements   Pain intensity: better  Seated Extension: repeated movements  Pain intensity: better    Thoracic      Lumbar      Strength/Myotome Testing   Cervical Spine     Left   Interossei strength (t1): 4    Right   Interossei strength (t1): 4    Left Elbow   Flexion: 4  Extension: 4    Right Elbow   Flexion: 4  Extension: 4-    Left Wrist/Hand   Thumb extension: 4    Right Wrist/Hand   Thumb extension: 4    General Comments:      Cervical/Thoracic Comments  Prone cervical extension with self overpressure with UEs on elbows centralized symptoms  Patient had some symptoms after sitting for a few minutes, after standing, patient's symptoms resided  Neuro Exam:     Headaches   Patient reports headaches: No               Precautions: none       Manuals 1/17            Cervical manuals If needed                                      Reassess dir pref if no better             Neuro Re-Ed                                                                                           Sitting and sleeping posture r            Ther Ex             Prone cervical extension w/ self overpressure r                                                                                                       Ther Activity                                       Gait Training                                       Modalities

## 2022-01-21 ENCOUNTER — OFFICE VISIT (OUTPATIENT)
Dept: PHYSICAL THERAPY | Facility: CLINIC | Age: 47
End: 2022-01-21
Payer: COMMERCIAL

## 2022-01-21 DIAGNOSIS — M54.12 CERVICAL RADICULOPATHY: Primary | ICD-10-CM

## 2022-01-21 DIAGNOSIS — M79.18 MYOFASCIAL PAIN SYNDROME: ICD-10-CM

## 2022-01-21 DIAGNOSIS — M54.2 NECK PAIN: ICD-10-CM

## 2022-01-21 DIAGNOSIS — M54.2 ACUTE NECK PAIN: ICD-10-CM

## 2022-01-21 PROCEDURE — 97112 NEUROMUSCULAR REEDUCATION: CPT | Performed by: PHYSICAL THERAPIST

## 2022-01-21 PROCEDURE — 97140 MANUAL THERAPY 1/> REGIONS: CPT | Performed by: PHYSICAL THERAPIST

## 2022-01-21 PROCEDURE — 97110 THERAPEUTIC EXERCISES: CPT | Performed by: PHYSICAL THERAPIST

## 2022-01-21 NOTE — PROGRESS NOTES
Daily Note     Today's date: 2022  Patient name: Nakul Vivas  : 1975  MRN: [id number]  Referring provider: Edy Malone DO  Dx:   Encounter Diagnosis     ICD-10-CM    1  Cervical radiculopathy  M54 12    2  Myofascial pain syndrome  M79 18    3  Neck pain  M54 2    4  Acute neck pain  M54 2                   Subjective: Patient notes still gets tingling down her UE  When she does the prone extension she gets tingling in her fingers  Objective: See treatment diary below      Assessment: Tolerated treatment well  Patient would benefit from continued PT  Seated cervical retraction with extension seemed to centralize symptoms  Plan: Progress treatment as tolerated         Precautions: none       Manuals            Cervical manuals If needed assessed                                     Reassess dir pref if no better  assessed           Neuro Re-Ed                                                                                           Sitting and sleeping posture r reviewed           Ther Ex             Prone cervical extension w/ self overpressure r                         Seated cervical retraction w/ extension  reviewed           UBE  3 min bwd                                                                Ther Activity                                       Gait Training                                       Modalities

## 2022-01-25 ENCOUNTER — OFFICE VISIT (OUTPATIENT)
Dept: PHYSICAL THERAPY | Facility: CLINIC | Age: 47
End: 2022-01-25
Payer: COMMERCIAL

## 2022-01-25 DIAGNOSIS — M79.18 MYOFASCIAL PAIN SYNDROME: ICD-10-CM

## 2022-01-25 DIAGNOSIS — M54.12 CERVICAL RADICULOPATHY: Primary | ICD-10-CM

## 2022-01-25 DIAGNOSIS — M54.2 NECK PAIN: ICD-10-CM

## 2022-01-25 DIAGNOSIS — M54.2 ACUTE NECK PAIN: ICD-10-CM

## 2022-01-25 PROCEDURE — 97110 THERAPEUTIC EXERCISES: CPT | Performed by: PHYSICAL THERAPIST

## 2022-01-25 PROCEDURE — 97140 MANUAL THERAPY 1/> REGIONS: CPT | Performed by: PHYSICAL THERAPIST

## 2022-01-25 PROCEDURE — 97112 NEUROMUSCULAR REEDUCATION: CPT | Performed by: PHYSICAL THERAPIST

## 2022-01-25 NOTE — PROGRESS NOTES
Daily Note     Today's date: 2022  Patient name: Carolynn Arriaga  : 1975  MRN: [id number]  Referring provider: Jamie Padgett DO  Dx:   Encounter Diagnosis     ICD-10-CM    1  Cervical radiculopathy  M54 12    2  Myofascial pain syndrome  M79 18    3  Neck pain  M54 2    4  Acute neck pain  M54 2                   Subjective: Patient notes doesn't get as much tingling down her fingers except at night  Notes still gets tightness feeling in her mid UE  Objective: See treatment diary below    Educated to trial towel while sleeping    Assessment: Tolerated treatment well  Patient would benefit from continued PT  Progressed seated cervical retraction with extension to include self overpressure  Added supine thoracic extension to HEP  Plan: Progress treatment as tolerated         Precautions: none       Manuals           Cervical manuals If needed assessed           Supine cervical retraction w/ traction w/ extension   MK          thoracic   Seated CT 1x w/ cavs           Reassess dir pref if no better  assessed           Neuro Re-Ed             Chin tucks   10x 5" holds                                                                            Sitting and sleeping posture r reviewed           Ther Ex             Prone cervical extension w/ self overpressure r                         Seated cervical retraction w/ extension  reviewed 12x w/ patient overpressure          UBE  3 min bwd  3 min bwd           Standing straight arm pull downs   2x10 grn           Standing horizontal abd   2x10 grn           Supine thoracic ext over horizontal   1 5 min                        Ther Activity                                       Gait Training                                       Modalities

## 2022-01-27 ENCOUNTER — OFFICE VISIT (OUTPATIENT)
Dept: PHYSICAL THERAPY | Facility: CLINIC | Age: 47
End: 2022-01-27
Payer: COMMERCIAL

## 2022-01-27 DIAGNOSIS — M79.18 MYOFASCIAL PAIN SYNDROME: ICD-10-CM

## 2022-01-27 DIAGNOSIS — M54.2 NECK PAIN: ICD-10-CM

## 2022-01-27 DIAGNOSIS — M54.12 CERVICAL RADICULOPATHY: Primary | ICD-10-CM

## 2022-01-27 PROCEDURE — 97112 NEUROMUSCULAR REEDUCATION: CPT | Performed by: PHYSICAL THERAPIST

## 2022-01-27 PROCEDURE — 97140 MANUAL THERAPY 1/> REGIONS: CPT | Performed by: PHYSICAL THERAPIST

## 2022-01-27 PROCEDURE — 97110 THERAPEUTIC EXERCISES: CPT | Performed by: PHYSICAL THERAPIST

## 2022-02-01 ENCOUNTER — OFFICE VISIT (OUTPATIENT)
Dept: PHYSICAL THERAPY | Facility: CLINIC | Age: 47
End: 2022-02-01
Payer: COMMERCIAL

## 2022-02-01 DIAGNOSIS — M79.18 MYOFASCIAL PAIN SYNDROME: ICD-10-CM

## 2022-02-01 DIAGNOSIS — M54.2 ACUTE NECK PAIN: ICD-10-CM

## 2022-02-01 DIAGNOSIS — M54.12 CERVICAL RADICULOPATHY: Primary | ICD-10-CM

## 2022-02-01 DIAGNOSIS — M54.2 NECK PAIN: ICD-10-CM

## 2022-02-01 PROCEDURE — 97140 MANUAL THERAPY 1/> REGIONS: CPT

## 2022-02-01 PROCEDURE — 97112 NEUROMUSCULAR REEDUCATION: CPT

## 2022-02-01 PROCEDURE — 97110 THERAPEUTIC EXERCISES: CPT

## 2022-02-01 NOTE — PROGRESS NOTES
Daily Note     Today's date: 2022  Patient name: Yeison Barrett  : 1975  MRN: [id number]  Referring provider: Paulo Apley, DO  Dx:   Encounter Diagnosis     ICD-10-CM    1  Cervical radiculopathy  M54 12    2  Myofascial pain syndrome  M79 18    3  Neck pain  M54 2    4  Acute neck pain  M54 2                   Subjective: Pt reports improved neck and rib pain, though she continues to have trouble sleeping at night due to discomfort and occasional tingling down RUE into extensors  Objective: See treatment diary below     Precautions: none     Manuals         Cervical manuals If needed assessed   SH        Supine cervical retraction w/ traction w/ extension   MK          thoracic   Seated CT 1x w/ cavs  seated CT 1x w/ cavs          Reassess dir pref if no better  assessed           Rib mobs    supine  and [address]  supine  and [address]        [facility]    [address]        Neuro Re-Ed             Chin tucks   10x 5" holds  12x 5" holds  5"x15                      foam series     1' ea:  pec stretch  Hugs  Snow angels   Scissors  5" punches                                               Sitting and sleeping posture r reviewed           Ther Ex             Prone cervical extension w/ self overpressure r            TB b/l ER     GTB 5"x15        Seated cervical retraction w/ extension  reviewed 12x w/ patient overpressure          UBE  3 min bwd  3 min bwd  3 min bwd  3' bw        Standing straight arm pull downs   2x10 grn  2x10 grn          Standing horizontal abd   2x10 grn  2x10 grn         Supine thoracic ext over horizontal   1 5 min  1 5 min  2' towel        Seated CT butterfly stretch    10x 3" holds  5"x10                                  Modalities                                           Assessment: Tolerated treatment well   Patient demonstrated fatigue post treatment, exhibited good technique with therapeutic exercises and would benefit from continued PT    Plan: Continue per plan of care  Progress treatment as tolerated      Soha Latham PTA

## 2022-02-03 ENCOUNTER — OFFICE VISIT (OUTPATIENT)
Dept: PHYSICAL THERAPY | Facility: CLINIC | Age: 47
End: 2022-02-03
Payer: COMMERCIAL

## 2022-02-03 DIAGNOSIS — M54.2 ACUTE NECK PAIN: ICD-10-CM

## 2022-02-03 DIAGNOSIS — M54.2 NECK PAIN: ICD-10-CM

## 2022-02-03 DIAGNOSIS — M54.12 CERVICAL RADICULOPATHY: Primary | ICD-10-CM

## 2022-02-03 DIAGNOSIS — M79.18 MYOFASCIAL PAIN SYNDROME: ICD-10-CM

## 2022-02-03 PROCEDURE — 97140 MANUAL THERAPY 1/> REGIONS: CPT

## 2022-02-03 PROCEDURE — 97112 NEUROMUSCULAR REEDUCATION: CPT

## 2022-02-03 PROCEDURE — 97110 THERAPEUTIC EXERCISES: CPT

## 2022-02-03 NOTE — PROGRESS NOTES
Daily Note     Today's date: 2/3/2022  Patient name: Vinod Layton  : 1975  MRN: [id number]  Referring provider: Kan Zuniga DO  Dx:   Encounter Diagnosis     ICD-10-CM    1  Cervical radiculopathy  M54 12    2  Myofascial pain syndrome  M79 18    3  Neck pain  M54 2    4  Acute neck pain  M54 2                   Subjective: Pt notes that her shoulder is more sore than her neck since LV and we discussed centralization  Objective: See treatment diary below     Precautions: none     Manuals  2/3       Cervical manuals If needed assessed   [address]        Supine cervical retraction w/ traction w/ extension   MK          thoracic   Seated CT 1x w/ cavs  seated CT 1x w/ cavs          Reassess dir pref if no better  assessed           Rib mobs    supine 1st and 2nd MK  supine 1st and 2nd  [address] supine 1st and [address]nd  [address]       [facility] STM    [address]        Neuro Re-Ed      2/3       Chin tucks   10x 5" holds  12x 5" holds  5"x15 5"x20                    1/2 foam series     1' ea:  pec stretch  Hugs  Snow angels   Scissors  5" punches 1' ea:  pec stretch  Hugs  Snow angels   Scissors  5" punches                                              Sitting and sleeping posture r reviewed           Ther Ex      2/3       Prone cervical extension w/ self overpressure r            TB b/l ER     GTB 5"x15 GTB 5"x20       Seated cervical retraction w/ extension  reviewed 12x w/ patient overpressure          UBE  3 min bwd  3 min bwd  3 min bwd  3' bw 3' bw       Standing straight arm pull downs   2x10 grn  2x10 grn  GTB 20x GTB 20x       Standing horizontal abd   2x10 grn  2x10 grn GTB 20x GTB 20x       Supine thoracic ext over horizontal   1 5 min  1 5 min  2' towel 2 5'  1/2 foam       Seated CT butterfly stretch    10x 3" holds  5"x10 5"x10                                                               Assessment: Tolerated treatment well   Patient demonstrated fatigue post treatment, exhibited good technique with therapeutic exercises and would benefit from continued PT    Plan: Continue per plan of care  Progress treatment as tolerated      Oracio Norwood, PTA

## 2022-02-07 ENCOUNTER — OFFICE VISIT (OUTPATIENT)
Dept: PHYSICAL THERAPY | Facility: CLINIC | Age: 47
End: 2022-02-07
Payer: COMMERCIAL

## 2022-02-07 DIAGNOSIS — M54.2 NECK PAIN: ICD-10-CM

## 2022-02-07 DIAGNOSIS — M54.12 CERVICAL RADICULOPATHY: Primary | ICD-10-CM

## 2022-02-07 DIAGNOSIS — M79.18 MYOFASCIAL PAIN SYNDROME: ICD-10-CM

## 2022-02-07 DIAGNOSIS — M54.2 ACUTE NECK PAIN: ICD-10-CM

## 2022-02-07 PROCEDURE — 97110 THERAPEUTIC EXERCISES: CPT | Performed by: PHYSICAL THERAPIST

## 2022-02-07 PROCEDURE — 97140 MANUAL THERAPY 1/> REGIONS: CPT | Performed by: PHYSICAL THERAPIST

## 2022-02-07 PROCEDURE — 97112 NEUROMUSCULAR REEDUCATION: CPT | Performed by: PHYSICAL THERAPIST

## 2022-02-07 NOTE — PROGRESS NOTES
Daily Note     Today's date: 2022  Patient name: Angela Willson  : 1975  MRN: [id number]  Referring provider: Gypsy Nash DO  Dx:   Encounter Diagnosis     ICD-10-CM    1  Cervical radiculopathy  M54 12    2  Myofascial pain syndrome  M79 18    3  Neck pain  M54 2    4  Acute neck pain  M54 2                   Subjective: Patient notes over the weekend her fingers on her R UE have been tingling more even with static positions and sleeping  Notes feels it today as well      Objective: See treatment diary below      R SB minimally limited compared to L   -cervical seated ROM no effect on symptoms  1st and second rib mobs still reproduce symptoms  R pec minor STM/stretch over half roll reproduces symptoms    Precautions: none     Manuals  2/3 2/7      Cervical manuals If needed assessed   [address]  Assessed and symptomatic most levels      Supine cervical retraction w/ traction w/ extension   MK          thoracic   Seated CT 1x w/ cavs  seated CT 1x w/ cavs    Seated CT 1x w/ cavs      Reassess dir pref if no better  assessed           Rib mobs    supine 1st and 2nd MK  supine 1st and 2nd [address] supine 1st and 2nd [address]      [place] STM    [address] SH        Neuro Re-Ed      2/3       Chin tucks   10x 5" holds  12x 5" holds  5"x15 5"x20 -                   1/2 foam series     1' ea:  pec stretch  Hugs  Snow angels   Scissors  5" punches 1' ea:  pec stretch  Hugs  Snow angels   Scissors  5" punches                                              Sitting and sleeping posture r reviewed           Ther Ex      2/3       Prone cervical extension w/ self overpressure r            TB b/l ER     GTB 5"x15 GTB 5"x20       Seated cervical retraction w/ extension  reviewed 12x w/ patient overpressure          UBE  3 min bwd  3 min bwd  3 min bwd  3' bw 3' bw 3 min       Standing straight arm pull downs   2x10 grn  2x10 grn  GTB 20x GTB 20x       Standing horizontal abd   2x10 grn  2x10 grn GTB 20x GTB 20x       Supine thoracic ext over horizontal   1 5 min  1 5 min  2' towel 2 5'  1/2 foam 2 min half roll       Seated CT butterfly stretch    10x 3" holds  5"x10 5"x10 10x 5" holds      pec minor stretch over vertical half roll        5x 20" holds       Prone low trap retraction       2x8                                     Assessment: Tolerated treatment well  Patient demonstrated fatigue post treatment, exhibited good technique with therapeutic exercises and would benefit from continued PT    Plan: Continue per plan of care  Progress treatment as tolerated      Cassidy Velazquez, PT

## 2022-02-10 ENCOUNTER — OFFICE VISIT (OUTPATIENT)
Dept: PHYSICAL THERAPY | Facility: CLINIC | Age: 47
End: 2022-02-10
Payer: COMMERCIAL

## 2022-02-10 DIAGNOSIS — M54.2 NECK PAIN: ICD-10-CM

## 2022-02-10 DIAGNOSIS — M79.18 MYOFASCIAL PAIN SYNDROME: ICD-10-CM

## 2022-02-10 DIAGNOSIS — M54.2 ACUTE NECK PAIN: ICD-10-CM

## 2022-02-10 DIAGNOSIS — M54.12 CERVICAL RADICULOPATHY: Primary | ICD-10-CM

## 2022-02-10 PROCEDURE — 97110 THERAPEUTIC EXERCISES: CPT | Performed by: PHYSICAL THERAPIST

## 2022-02-10 PROCEDURE — 97140 MANUAL THERAPY 1/> REGIONS: CPT | Performed by: PHYSICAL THERAPIST

## 2022-02-10 NOTE — PROGRESS NOTES
Daily Note     Today's date: 2/10/2022  Patient name: Dayo Mondragon  : 1975  MRN: [id number]  Referring provider: Jessy Rooney DO  Dx:   Encounter Diagnosis     ICD-10-CM    1  Cervical radiculopathy  M54 12    2  Myofascial pain syndrome  M79 18    3  Neck pain  M54 2    4  Acute neck pain  M54 2                   Subjective: Patient notes symptoms still flared up from last visit but did have some better days since  Today was a good day but then her tingling increased in her R hand      Objective: See treatment diary below      R SB minimally limited compared to L   -cervical seated ROM no effect on symptoms  1st and second rib mobs still reproduce symptoms  R pec minor STM/stretch over half roll reproduces symptoms  Radial neural tension positive   Thoracic mobs increased symptoms, abolished post prone screw home Gr V mid thoracic    Precautions: none     Manuals  2/3 2/7 2/10     Cervical manuals If needed assessed   [address]  Assessed and symptomatic most levels      Supine cervical retraction w/ traction w/ extension   MK          thoracic   Seated CT 1x w/ cavs  seated CT 1x w/ cavs    Seated CT 1x w/ cavs Seated CT 1x w/ cavs, prone screw home 1x w/ cavs     Reassess dir pref if no better  assessed           Rib mobs    supine 1st and 2nd  [address]  supine 1st and 2nd  [address] supine 1st and 2nd  [address]     [place] STM     [address] [facility]       Neuro Re-Ed      23       Chin tucks   10x 5" holds  12x 5" holds  5"x15 5"x20 -                    foam series     1' ea:  pec stretch  Hugs  Snow angels   Scissors  5" punches 1' ea:  pec stretch  Hugs  Snow angels   Scissors  5" punches                                              Sitting and sleeping posture r reviewed           Ther Ex      2/3       Prone cervical extension w/ self overpressure r            TB b/l ER     GTB 5"x15 GTB 5"x20       Seated cervical retraction w/ extension  reviewed 12x w/ patient overpressure          UBE  3 min bwd  3 min bwd  3 min bwd  3' bw 3' bw 3 min  3 min      Standing straight arm pull downs   2x10 grn  2x10 grn  GTB 20x GTB 20x       Standing horizontal abd   2x10 grn  2x10 grn GTB 20x GTB 20x       Supine thoracic ext over horizontal   1 5 min  1 5 min  2' towel 2 5'  1/2 foam 2 min half roll  2 min half roll      Seated CT butterfly stretch    10x 3" holds  5"x10 5"x10 10x 5" holds 10x 5" holds     pec minor stretch over vertical half roll        5x 20" holds  5x 20" holds      Prone low trap retraction       2x8                                     Assessment: Tolerated treatment well  Patient demonstrated fatigue post treatment, exhibited good technique with therapeutic exercises and would benefit from continued PT    Plan: Continue per plan of care  Progress treatment as tolerated      Yessy Mathews, PT

## 2022-02-11 ENCOUNTER — ANNUAL EXAM (OUTPATIENT)
Dept: OBGYN CLINIC | Facility: CLINIC | Age: 47
End: 2022-02-11
Payer: COMMERCIAL

## 2022-02-11 VITALS
SYSTOLIC BLOOD PRESSURE: 120 MMHG | HEIGHT: 66 IN | WEIGHT: 157.6 LBS | BODY MASS INDEX: 25.33 KG/M2 | DIASTOLIC BLOOD PRESSURE: 82 MMHG

## 2022-02-11 DIAGNOSIS — Z01.419 ENCOUNTER FOR WELL WOMAN EXAM WITH ROUTINE GYNECOLOGICAL EXAM: ICD-10-CM

## 2022-02-11 DIAGNOSIS — Z01.419 ENCOUNTER FOR ROUTINE GYNECOLOGICAL EXAMINATION WITH PAPANICOLAOU SMEAR OF CERVIX: Primary | ICD-10-CM

## 2022-02-11 DIAGNOSIS — Z12.31 ENCOUNTER FOR SCREENING MAMMOGRAM FOR MALIGNANT NEOPLASM OF BREAST: ICD-10-CM

## 2022-02-11 PROCEDURE — G0145 SCR C/V CYTO,THINLAYER,RESCR: HCPCS | Performed by: OBSTETRICS & GYNECOLOGY

## 2022-02-11 PROCEDURE — S0612 ANNUAL GYNECOLOGICAL EXAMINA: HCPCS | Performed by: OBSTETRICS & GYNECOLOGY

## 2022-02-11 NOTE — PROGRESS NOTES
ASSESSMENT & PLAN: Yeison Barrett is a 48 y o  Natalie Salazar with normal gynecologic exam     1   Routine well woman exam done today  2  Pap and HPV:  The patient's last pap was   It was normal   Pap with reflex cotesting was done today  Current ASCCP Guidelines reviewed  3   Mammogram ordered  4  The following were reviewed in today's visit: breast self exam and mammography screening ordered  5  Contraception: none    CC:  Annual Gynecologic Examination    HPI: Yeison Barrett is a 48 y o  Natalie Salazar who presents for annual gynecologic examination  She has the following concerns: states has not had a cycle since October  Occasionally     Health Maintenance:    She wears her seatbelt routinely  She does perform regular monthly self breast exams  She feels safe at home  There is no problem list on file for this patient  Past Medical History:   Diagnosis Date    Anxiety     Back pain     CVA (cerebral vascular accident) ([facility] [address] )     Depression     Hypertension     Palpitation     TIA (transient ischemic attack)     Ventral hernia        No past surgical history on file  Past OB/Gyn History:  OB History        0    Para   0    Term   0       0    AB   0    Living   0       SAB   0    IAB   0    Ectopic   0    Multiple   0    Live Births   0                  Pt does not have menstrual issues     History of sexually transmitted infection: No   History of abnormal pap smears: No       Patient is not currently sexually active  heterosexual  The current method of family planning is none      Family History   Problem Relation Age of Onset    No Known Problems Mother     Prostate cancer Father 79    Cancer Father         Prostate    No Known Problems Maternal Grandmother     No Known Problems Maternal Grandfather     No Known Problems Paternal Grandmother     No Known Problems Paternal Grandfather     No Known Problems Maternal Aunt     No Known Problems Paternal Aunt     BRCA2 Negative Neg Hx     BRCA2 Positive Neg Hx     BRCA1 Positive Neg Hx     BRCA1 Negative Neg Hx     BRCA 1/2 Neg Hx     Ovarian cancer Neg Hx     Endometrial cancer Neg Hx     Colon cancer Neg Hx     Breast cancer additional onset Neg Hx     Breast cancer Neg Hx        Social History:  Social History     Socioeconomic History    Marital status: Single     Spouse name: Not on file    Number of children: Not on file    Years of education: Not on file    Highest education level: Not on file   Occupational History    Not on file   Tobacco Use    Smoking status: Former Smoker    Smokeless tobacco: Never Used   Vaping Use    Vaping Use: Never used   Substance and Sexual Activity    Alcohol use: Yes     Alcohol/week: 2 0 standard drinks     Types: 2 Glasses of wine per week    Drug use: No    Sexual activity: Not Currently     Partners: Male   Other Topics Concern    Not on file   Social History Narrative    Pt states she has coffee daily     Social Determinants of Health     Financial Resource Strain: Not on file   Food Insecurity: Not on file   Transportation Needs: Not on file   Physical Activity: Not on file   Stress: Not on file   Social Connections: Not on file   Intimate Partner Violence: Not on file   Housing Stability: Not on file     No Known Allergies    Current Outpatient Medications:     ascorbic acid (VITAMIN C) 500 mg tablet, Take 500 mg by mouth daily, Disp: , Rfl:     Calcium Carbonate (CALTRATE 600) 1500 (600 Ca) MG TABS, Take by mouth, Disp: , Rfl:     Cholecalciferol (VITAMIN D3) 1000 units CAPS, Take by mouth, Disp: , Rfl:     Review of Systems:    Review of Systems  Constitutional :no fever, feels well, no tiredness, no recent weight gain or loss  ENT: no ear ache, no loss of hearing, no nosebleeds or nasal discharge, no sore throat or hoarseness    Cardiovascular: no complaints of slow or fast heart beat, no chest pain, no palpitations, no leg claudication or lower extremity edema  Respiratory: no complaints of shortness of shortness of breath, no HOWARD  Breasts:no complaints of breast pain, breast lump, or nipple discharge  Gastrointestinal: no complaints of abdominal pain, constipation, nausea, vomiting, or diarrhea or bloody stools  Genitourinary : no complaints of dysuria, incontinence, pelvic pain, no dysmenorrhea, vaginal discharge or abnormal vaginal bleeding and as noted in HPI  Musculoskeletal: no complaints of arthralgia, no myalgia, no joint swelling or stiffness, no limb pain or swelling  Integumentary: no complaints of skin rash or lesion, itching or dry skin  Neurological: no complaints of headache, no confusion, no numbness or tingling, no dizziness or fainting    Objective      /82   Ht 5' 6" (1 676 m)   Wt 71 5 kg (157 lb 9 6 oz)   LMP  (Within Months) Comment: 10/2021  BMI 25 44 kg/m²     General:   appears stated age, cooperative, alert normal mood and affect   Neck: normal, supple,trachea midline, no masses   Heart: regular rate and rhythm, S1, S2 normal, no murmur, click, rub or gallop   Lungs: clear to auscultation bilaterally   Breasts: normal appearance, no masses or tenderness, Inspection negative, No nipple retraction or dimpling, No nipple discharge or bleeding, No axillary or supraclavicular adenopathy, Normal to palpation without dominant masses   Abdomen: soft, non-tender, without masses or organomegaly   Vulva: normal female genitalia, Bartholin's, Urethra, [place] normal, no lesions, normal female hair distribution   Vagina: normal vagina, no discharge, exudate, lesion, or erythema   Urethra: normal   Cervix: Normal, no discharge  PAP done  Uterus: normal size, contour, position, consistency, mobility, non-tender   Adnexa: normal adnexa and no mass, fullness, tenderness   Lymphatic palpation of lymph nodes in neck, axilla, groin and/or other locations: no lymphadenopathy or masses noted   Skin normal skin turgor and no rashes  Psychiatric orientation to person, place, and time: normal  mood and affect: normal

## 2022-02-15 ENCOUNTER — OFFICE VISIT (OUTPATIENT)
Dept: PHYSICAL THERAPY | Facility: CLINIC | Age: 47
End: 2022-02-15
Payer: COMMERCIAL

## 2022-02-15 DIAGNOSIS — M54.2 ACUTE NECK PAIN: ICD-10-CM

## 2022-02-15 DIAGNOSIS — M54.2 NECK PAIN: ICD-10-CM

## 2022-02-15 DIAGNOSIS — M79.18 MYOFASCIAL PAIN SYNDROME: ICD-10-CM

## 2022-02-15 DIAGNOSIS — M54.12 CERVICAL RADICULOPATHY: Primary | ICD-10-CM

## 2022-02-15 PROCEDURE — 97110 THERAPEUTIC EXERCISES: CPT | Performed by: PHYSICAL THERAPIST

## 2022-02-15 PROCEDURE — 97140 MANUAL THERAPY 1/> REGIONS: CPT | Performed by: PHYSICAL THERAPIST

## 2022-02-17 ENCOUNTER — APPOINTMENT (OUTPATIENT)
Dept: PHYSICAL THERAPY | Facility: CLINIC | Age: 47
End: 2022-02-17
Payer: COMMERCIAL

## 2022-02-17 ENCOUNTER — PROBLEM (OUTPATIENT)
Dept: URBAN - METROPOLITAN AREA CLINIC 6 | Facility: CLINIC | Age: 47
End: 2022-02-17

## 2022-02-17 DIAGNOSIS — G43.B0: ICD-10-CM

## 2022-02-17 DIAGNOSIS — H04.123: ICD-10-CM

## 2022-02-17 LAB
LAB AP GYN PRIMARY INTERPRETATION: NORMAL
Lab: NORMAL

## 2022-02-17 PROCEDURE — 92004 COMPRE OPH EXAM NEW PT 1/>: CPT

## 2022-02-17 ASSESSMENT — VISUAL ACUITY
OS_CC: 20/20
OD_CC: 20/25-2

## 2022-02-17 ASSESSMENT — TONOMETRY
OD_IOP_MMHG: 21
OS_IOP_MMHG: 22

## 2022-02-18 ENCOUNTER — OFFICE VISIT (OUTPATIENT)
Dept: PHYSICAL THERAPY | Facility: CLINIC | Age: 47
End: 2022-02-18
Payer: COMMERCIAL

## 2022-02-18 DIAGNOSIS — M79.18 MYOFASCIAL PAIN SYNDROME: ICD-10-CM

## 2022-02-18 DIAGNOSIS — M54.2 ACUTE NECK PAIN: ICD-10-CM

## 2022-02-18 DIAGNOSIS — M54.2 NECK PAIN: ICD-10-CM

## 2022-02-18 DIAGNOSIS — M54.12 CERVICAL RADICULOPATHY: Primary | ICD-10-CM

## 2022-02-18 PROCEDURE — 97140 MANUAL THERAPY 1/> REGIONS: CPT | Performed by: PHYSICAL THERAPIST

## 2022-02-18 PROCEDURE — 97110 THERAPEUTIC EXERCISES: CPT | Performed by: PHYSICAL THERAPIST

## 2022-02-18 NOTE — PROGRESS NOTES
Daily Note     Today's date: 2022  Patient name: Kelli Cox  : 1975  MRN: [id number]  Referring provider: Jules Salter DO  Dx:   Encounter Diagnosis     ICD-10-CM    1  Cervical radiculopathy  M54 12    2  Myofascial pain syndrome  M79 18    3  Neck pain  M54 2    4  Acute neck pain  M54 2        Start Time: 0835          Subjective: Patient notes had a little tingling yesterday but today hasn't had any yet and slept better      Objective: See treatment diary below      L shear limited T1/T2   -more so with right rotation       Precautions: none     Manuals 1/27 2/1 2/3 2/7 2/10 2/15 2/18   Cervical manuals  SH SH Assessed and symptomatic most levels      Supine cervical retraction w/ traction w/ extension          thoracic seated CT 1x w/ cavs    Seated CT 1x w/ cavs Seated CT 1x w/ cavs, prone screw home 1x w/ cavs Prone CT B w/ cavs, L shear correction T1/T2 mob MK/ND  Prone CT B w/ cavs, seated CT w/ cavs, L shear correction T1/T2 mob MK   Rib mobs supine 1st and 2nd [facility]  supine 1st and 2nd [facility] supine 1st and 2nd [facility] SH                           Neuro Re-Ed   2/3       Chin tucks 12x 5" holds  5"x15 5"x20 -                1/2 foam series  1' ea:  pec stretch  Hugs  Snow angels   Scissors  5" punches 1' ea:  pec stretch  Hugs  Snow angels   Scissors  5" punches                 Sitting and sleeping posture          Ther Ex   2/3       Prone cervical extension w/ self overpressure          TB b/l ER  GTB 5"x15 GTB 5"x20       Self first rib mob      With L shear and R UE abd slightly w/ palm facing forward With L shear and R UE abd slightly w/ palm facing forward   UBE 3 min bwd  3' bw 3' bw 3 min  3 min      Standing straight arm pull downs 2x10 grn  GTB 20x GTB 20x       Standing horizontal abd 2x10 grn GTB 20x GTB 20x       Supine thoracic ext over horizontal 1 5 min  2' towel 2 5'  1/2 foam 2 min half roll  2 min half roll      Seated CT butterfly stretch 10x 3" holds  5"x10 5"x10 10x 5" holds 10x 5" holds Standing against wall or supine CT stretch w/ anterior shear and chin tuck  Supine w/ chin tuck and self ant shear 10x 5" holds   pec minor stretch over vertical half roll     5x 20" holds  5x 20" holds      Wall wrist flexion/carpal tunnel stretch      Reviewed     No moneys       2x10 grn                  Assessment: Tolerated treatment well  Patient demonstrated fatigue post treatment, exhibited good technique with therapeutic exercises and would benefit from continued PT    Plan: Continue per plan of care  Progress treatment as tolerated      Reyna Officer, PT

## 2022-02-22 ENCOUNTER — OFFICE VISIT (OUTPATIENT)
Dept: PHYSICAL THERAPY | Facility: CLINIC | Age: 47
End: 2022-02-22
Payer: COMMERCIAL

## 2022-02-22 DIAGNOSIS — M54.2 ACUTE NECK PAIN: ICD-10-CM

## 2022-02-22 DIAGNOSIS — M54.2 NECK PAIN: ICD-10-CM

## 2022-02-22 DIAGNOSIS — M79.18 MYOFASCIAL PAIN SYNDROME: ICD-10-CM

## 2022-02-22 DIAGNOSIS — M54.12 CERVICAL RADICULOPATHY: Primary | ICD-10-CM

## 2022-02-22 PROCEDURE — 97110 THERAPEUTIC EXERCISES: CPT | Performed by: PHYSICAL THERAPIST

## 2022-02-22 PROCEDURE — 97112 NEUROMUSCULAR REEDUCATION: CPT | Performed by: PHYSICAL THERAPIST

## 2022-02-22 PROCEDURE — 97140 MANUAL THERAPY 1/> REGIONS: CPT | Performed by: PHYSICAL THERAPIST

## 2022-02-22 NOTE — PROGRESS NOTES
Daily Note     Today's date: 2022  Patient name: Marie Emanuel  : 1975  MRN: [id number]  Referring provider: Christin Lawrence DO  Dx:   Encounter Diagnosis     ICD-10-CM    1  Cervical radiculopathy  M54 12    2  Myofascial pain syndrome  M79 18    3  Neck pain  M54 2    4  Acute neck pain  M54 2                   Subjective: Patient notes only had tingling during supine figure 4 stretch during yoga      Objective: See treatment diary below      L shear limited T1/T2   -more so with right rotation       Precautions: none     Manuals / 2/3 2/7 2/10 2/15 2/18 2/22   Cervical manuals [facility] Assessed and symptomatic most levels       Supine cervical retraction w/ traction w/ extension          thoracic   Seated CT 1x w/ cavs Seated CT 1x w/ cavs, prone screw home 1x w/ cavs Prone CT B w/ cavs, L shear correction T1/T2 mob MK/ND  Prone CT B w/ cavs, seated CT w/ cavs, L shear correction T1/T2 mob MK Prone CT B w/ cavs, seated CT w/ cavs, L shear correction T1/T2 mob MK   Rib mobs supine 1st and 2nd  [address] supine  and 2nd [address]      [place] STM [address]                             Neuro Re-Ed  2/3        Chin tucks 5"x15 5"x20 -                 1/2 foam series 1' ea:  pec stretch  Hugs  Snow angels   Scissors  5" punches 1' ea:  pec stretch  Hugs  Snow angels   Scissors  5" punches                  Sitting and sleeping posture          Ther Ex  2/3        Prone cervical extension w/ self overpressure          TB b/l ER GTB 5"x15 GTB 5"x20        Self first rib mob     With L shear and R UE abd slightly w/ palm facing forward With L shear and R UE abd slightly w/ palm facing forward With L shear and R UE abd slightly w/ palm facing forward   UBE 3' bw 3' bw 3 min  3 min       Standing straight arm pull downs GTB 20x GTB 20x        Standing horizontal abd GTB 20x GTB 20x        Supine thoracic ext over horizontal 2' towel 2 5'  1/2 foam 2 min half roll  2 min half roll       Seated CT butterfly stretch 5"x10 5"x10 10x 5" holds 10x 5" holds Standing against wall or supine CT stretch w/ anterior shear and chin tuck  Supine w/ chin tuck and self ant shear 10x 5" holds Supine w/ chin tuck and self ant shear 10x 5" holds   pec minor stretch over vertical half roll    5x 20" holds  5x 20" holds       Wall wrist flexion/carpal tunnel stretch     Reviewed      No moneys      2x10 grn  2x12 grn                  Assessment: Tolerated treatment well  Patient demonstrated fatigue post treatment, exhibited good technique with therapeutic exercises and would benefit from continued PT    Plan: Continue per plan of care  Progress treatment as tolerated      Sun Arreola, PT

## 2022-02-24 ENCOUNTER — OFFICE VISIT (OUTPATIENT)
Dept: PHYSICAL THERAPY | Facility: CLINIC | Age: 47
End: 2022-02-24
Payer: COMMERCIAL

## 2022-02-24 DIAGNOSIS — M54.12 CERVICAL RADICULOPATHY: Primary | ICD-10-CM

## 2022-02-24 DIAGNOSIS — M54.2 ACUTE NECK PAIN: ICD-10-CM

## 2022-02-24 DIAGNOSIS — M79.18 MYOFASCIAL PAIN SYNDROME: ICD-10-CM

## 2022-02-24 DIAGNOSIS — M54.2 NECK PAIN: ICD-10-CM

## 2022-02-24 PROCEDURE — 97140 MANUAL THERAPY 1/> REGIONS: CPT | Performed by: PHYSICAL THERAPIST

## 2022-02-24 PROCEDURE — 97110 THERAPEUTIC EXERCISES: CPT | Performed by: PHYSICAL THERAPIST

## 2022-02-24 NOTE — PROGRESS NOTES
Daily Note     Today's date: 2022  Patient name: León John  : 1975  MRN: [id number]  Referring provider: Lex Wade DO  Dx:   Encounter Diagnosis     ICD-10-CM    1  Cervical radiculopathy  M54 12    2  Myofascial pain syndrome  M79 18    3  Neck pain  M54 2    4  Acute neck pain  M54 2                   Subjective: Patient notes ready for discharge      Objective: See treatment diary below      L shear limited T1/T2   -more so with right rotation       Precautions: none     Manuals 2/3 2/7 2/10 2/15 2/18 2/22 2/24   Cervical manuals SH Assessed and symptomatic most levels        Supine cervical retraction w/ traction w/ extension          thoracic  Seated CT 1x w/ cavs Seated CT 1x w/ cavs, prone screw home 1x w/ cavs Prone CT B w/ cavs, L shear correction T1/T2 mob MK/ND  Prone CT B w/ cavs, seated CT w/ cavs, L shear correction T1/T2 mob MK Prone CT B w/ cavs, seated CT w/ cavs, L shear correction T1/T2 mob MK Prone CT B w/ cavs, seated CT w/ cavs, Lcorrection T1/T2 mob MK   Rib mobs supine  and 2nd  [address]       [address] [facility]                             Neuro Re-Ed 2/3         Chin tucks 5"x20 -                  1/2 foam series 1' ea:  pec stretch  Hugs  Snow angels   Scissors  5" punches                   Sitting and sleeping posture          Ther Ex 2/3         Prone cervical extension w/ self overpressure          TB b/l ER GTB 5"x20         Self first rib mob    With L shear and R UE abd slightly w/ palm facing forward With L shear and R UE abd slightly w/ palm facing forward With L shear and R UE abd slightly w/ palm facing forward With L shear and R UE abd slightly w/ palm facing forward   UBE 3' bw 3 min  3 min        Standing straight arm pull downs GTB 20x         Standing horizontal abd GTB 20x         Supine thoracic ext over horizontal 2 5'  1/2 foam 2 min half roll  2 min half roll        Seated CT butterfly stretch 5"x10 10x 5" holds 10x 5" holds Standing against wall or supine CT stretch w/ anterior shear and chin tuck  Supine w/ chin tuck and self ant shear 10x 5" holds Supine w/ chin tuck and self ant shear 10x 5" holds Supine w/ chin tuck and self ant shear 15x 5" holds   pec minor stretch over vertical half roll   5x 20" holds  5x 20" holds        Wall wrist flexion/carpal tunnel stretch    Reviewed       No moneys     2x10 grn  2x12 grn  2x12 grn                  Assessment: Tolerated treatment well  Patient demonstrated fatigue post treatment, exhibited good technique with therapeutic exercises and would benefit from continued PT    Plan: Continue per plan of care  Progress treatment as tolerated      Luann Machado, PT

## 2022-03-22 ENCOUNTER — ESTABLISHED COMPREHENSIVE EXAM (OUTPATIENT)
Dept: URBAN - METROPOLITAN AREA CLINIC 6 | Facility: CLINIC | Age: 47
End: 2022-03-22

## 2022-03-22 DIAGNOSIS — Z01.00: ICD-10-CM

## 2022-03-22 PROCEDURE — 92015 DETERMINE REFRACTIVE STATE: CPT

## 2022-03-22 PROCEDURE — 92014 COMPRE OPH EXAM EST PT 1/>: CPT

## 2022-03-22 ASSESSMENT — VISUAL ACUITY
OU_CC: J1
OD_CC: 20/50
OS_CC: 20/20
OS_GLARE: 20/40
OD_PH: 20/30-1
OD_CC: 20/20
OS_CC: 20/20
OD_GLARE: 20/30

## 2022-03-22 ASSESSMENT — KERATOMETRY
OD_AXISANGLE2_DEGREES: 58
OD_K1POWER_DIOPTERS: 45.00
OS_K2POWER_DIOPTERS: 46.50
OS_AXISANGLE_DEGREES: 2
OS_AXISANGLE2_DEGREES: 92
OD_AXISANGLE_DEGREES: 148
OS_K1POWER_DIOPTERS: 45.25
OD_K2POWER_DIOPTERS: 46.00

## 2022-03-22 ASSESSMENT — TONOMETRY
OD_IOP_MMHG: 18
OS_IOP_MMHG: 22

## 2022-04-19 ENCOUNTER — OFFICE VISIT (OUTPATIENT)
Dept: DERMATOLOGY | Facility: CLINIC | Age: 47
End: 2022-04-19
Payer: COMMERCIAL

## 2022-04-19 ENCOUNTER — TELEPHONE (OUTPATIENT)
Dept: DERMATOLOGY | Facility: CLINIC | Age: 47
End: 2022-04-19

## 2022-04-19 VITALS — BODY MASS INDEX: 24.91 KG/M2 | TEMPERATURE: 97.2 F | WEIGHT: 155 LBS | HEIGHT: 66 IN

## 2022-04-19 DIAGNOSIS — D22.60 MULTIPLE BENIGN MELANOCYTIC NEVI OF UPPER AND LOWER EXTREMITIES AND TRUNK: ICD-10-CM

## 2022-04-19 DIAGNOSIS — D22.70 MULTIPLE BENIGN MELANOCYTIC NEVI OF UPPER AND LOWER EXTREMITIES AND TRUNK: ICD-10-CM

## 2022-04-19 DIAGNOSIS — L71.9 ROSACEA: Primary | ICD-10-CM

## 2022-04-19 DIAGNOSIS — D22.5 MULTIPLE BENIGN MELANOCYTIC NEVI OF UPPER AND LOWER EXTREMITIES AND TRUNK: ICD-10-CM

## 2022-04-19 DIAGNOSIS — B07.9 VERRUCA VULGARIS: ICD-10-CM

## 2022-04-19 PROCEDURE — 99204 OFFICE O/P NEW MOD 45 MIN: CPT | Performed by: DERMATOLOGY

## 2022-04-19 PROCEDURE — 17110 DESTRUCTION B9 LES UP TO 14: CPT | Performed by: DERMATOLOGY

## 2022-04-19 NOTE — PROGRESS NOTES
[address] [facility] Note     Patient Name: Tiara Machado  Encounter Date: 04/19/2022     Have you been cared for by a [facility] Dermatologist in the last 3 years and, if so, which one? No    · Have you traveled outside of the [address] [place] [address] in the past 3 months or outside of the [facility] area in the last 2 weeks? No     May we call your Preferred Phone number to discuss your specific medical information? Yes     May we leave a detailed message that includes your specific medical information? Yes      Today's Chief Concerns:   Concern #1:  Spot of concern on nose    Concern #2:      Past Medical History:   Have you personally ever had or currently have any of the following? · Skin cancer (such as Melanoma, Basal Cell Carcinoma, Squamous Cell Carcinoma? (If Yes, please provide more detail)- No  · Eczema: No  · Psoriasis: No  · HIV/AIDS: No  · Hepatitis B or C: No  · Tuberculosis: No  · Systemic Immunosuppression such as Diabetes, Biologic or Immunotherapy, Chemotherapy, Organ Transplantation, Bone Marrow Transplantation (If YES, please provide more detail): No  · Radiation Treatment (If YES, please provide more detail): No  · Any other major medical conditions/concerns? (If Yes, which types)- No    Social History:     What is/was your primary occupation?       What are your hobbies/past-times? Not at this time     Family History:  Have any of your "first degree relatives" (parent, brother, sister, or child) had any of the following       · Skin cancer such as Melanoma or Merkel Cell Carcinoma or Pancreatic Cancer? YES, paternal grandmother with pancreatic cancer   · Eczema, Asthma, Hay Fever or Seasonal Allergies: No  · Psoriasis or Psoriatic Arthritis: No  · Do any other medical conditions seem to run in your family? If Yes, what condition and which relatives?   YES, father and paternal aunt with possible skin cancer     Current Medications: (please update all dermatological medications before printing patient's AVS!)      Current Outpatient Medications:     ascorbic acid (VITAMIN C) 500 mg tablet, Take 500 mg by mouth daily, Disp: , Rfl:     Calcium Carbonate (CALTRATE 600) 1500 (600 Ca) MG TABS, Take by mouth, Disp: , Rfl:     Cholecalciferol (VITAMIN D3) 1000 units CAPS, Take by mouth, Disp: , Rfl:       Review of Systems:  Have you recently had or currently have any of the following? If YES, what are you doing for the problem? · Fever, chills or unintended weight loss: No  · Sudden loss or change in your vision: No  · Nausea, vomiting or blood in your stool: No  · Painful or swollen joints: No  · Wheezing or cough: No  · Changing mole or non-healing wound: YES, non healing   · Nosebleeds: No  · Excessive sweating: No  · Easy or prolonged bleeding? No  · Over the last 2 weeks, how often have you been bothered by the following problems? · Taking little interest or pleasure in doing things: 1 - Not at All  · Feeling down, depressed, or hopeless: 1 - Not at All  · Rapid heartbeat with epinephrine:  No    · FEMALES ONLY:    · Are you pregnant or planning to become pregnant? No  · Are you currently or planning to be nursing or breast feeding? No    · Any known allergies?       No Known Allergies      Physical Exam:     Was a chaperone (Derm Clinical Assistant) present throughout the entire Physical Exam? Yes      CONSTITUTIONAL:   Vitals:    04/19/22 1428   Temp: (!) 97 2 °F (36 2 °C)   TempSrc: Temporal   Weight: 70 3 kg (155 lb)   Height: 5' 6" (1 676 m)       PSYCH: Normal mood and affect  EYES: Normal conjunctiva  ENT: Normal lips and oral mucosa  CARDIOVASCULAR: No edema  RESPIRATORY: Normal respirations  HEME/LYMPH/IMMUNO:  No regional lymphadenopathy except as noted below in "ASSESSMENT AND PLAN BY DIAGNOSIS"    SKIN:  FULL ORGAN SYSTEM EXAM   Hair, Scalp, Ears, Face Normal except as noted below in Assessment   Neck, Cervical Chain Nodes Normal except as noted below in Assessment   Right Arm/Hand/Fingers    Left Arm/Hand/Fingers    Chest/Breasts/Axillae    Abdomen, Umbilicus    Back/Spine Normal except as noted below in Assessment   Groin/Genitalia/Buttocks    Right Leg, Foot, Toes    Left Leg, Foot, Toes         Assessment and Plan by Diagnosis:    History of Present Condition:     Duration:  How long has this been an issue for you?    o  noticed this past Wednesday    Location Affected:  Where on the body is this affecting you?    o  tip of nose    Quality:  Is there any bleeding, pain, itch, burning/irritation, or redness associated with the skin lesion? o  red, a little itchy and sore to the touch    Severity:  Describe any bleeding, pain, itch, burning/irritation, or redness on a scale of 1 to 10 (with 10 being the worst)  o  3 or 4 with appearance    Timing:  Does this condition seem to be there pretty constantly or do you notice it more at specific times throughout the day? o  constant    Context:  Have you ever noticed that this condition seems to be associated with specific activities you do?    o  denies    Modifying Factors:    o Anything that seems to make the condition worse?    -  denies   o What have you tried to do to make the condition better?    -  ice    Associated Signs and Symptoms:  Does this skin lesion seem to be associated with any of the following:  o  SL AMB DERM SIGNS AND SYMPTOMS: Redness     ROSACEA R/O OTHER  Physical Exam:   Anatomic Location Affected:  Right nasal tip   Morphological Description:  Well defined red plaque    Pertinent Positives:   Pertinent Negatives: Additional History of Present Condition:  Sudden onset  slightly tender  History of infrequent cold sores     Assessment and Plan: + h/o cold sores but no tingling and no vesicles    MIldly tender, possibly rosacea - treat as such and if doesn't resolve consider by to r/o pseudolymphoma and granuloma faciale  Based on a thorough discussion of this condition and the management approach to it (including a comprehensive discussion of the known risks, side effects and potential benefits of treatment), the patient (family) agrees to implement the following specific plan:   Start metronidazole 0 75% cream twice a day topically to nose for 6 weeks    If no improvement  Will consider biopsy    Follow up in 6 weeks       MELANOCYTIC NEVI ("Moles")    Physical Exam:   Anatomic Location Affected:   Mostly on sun-exposed areas of the Trunk    Morphological Description:  Scattered, 1-4mm round to ovoid, symmetrical-appearing, even bordered, skin colored to dark brown macules/papules, mostly in sun-exposed areas   Pertinent Positives:   Pertinent Negatives: Additional History of Present Condition:  Patient is here for full skin exam     Assessment and Plan:  Based on a thorough discussion of this condition and the management approach to it (including a comprehensive discussion of the known risks, side effects and potential benefits of treatment), the patient (family) agrees to implement the following specific plan:   Monitor for changes    When outside we recommend using a wide brim hat, sunglasses, long sleeve and pants, sunscreen with SPF 81+ with reapplication every 2 hours, or SPF specific clothing      Melanocytic Nevi  Melanocytic nevi ("moles") are caused by collections of the color producing skin cells, or melanocytes, in 1 area in the skin  They can range in color from pink to dark brown and be either raised or flat  Some moles are present at birth (I e , "congenital nevi"), while others come up later in life (i e , "acquired nevi")  Ibbi Simper exposure also stimulates the body to make more moles, ie the more sun you get the more moles you'll grow  Clinically distinguishing a healthy mole from melanoma may be difficult   The "ABCDE's" of moles have been suggested as a means of helping to alert a person to a suspicious mole and the possible increased risk of melanoma  Asymmetry: Healthy moles tend to be symmetric, while melanomas are often asymmetric  Asymmetry means if you draw a line through the mole, the two halves do not match in color, size, shape, or surface texture Any mole that starts to demonstrate "asymmetry" should be examined promptly by a board certified dermatologist      Border: Healthy moles tend to have discrete, even borders  The border of a melanoma often blends into the normal skin and does not sharply delineate the mole from normal skin  Any mole that starts to demonstrate "uneven borders" should be examined promptly     Color: Healthy moles tend to be one color throughout  Melanomas tend to be made up of different colors ranging from dark black, blue, white, or red  Any mole that demonstrates a color change should be examined promptly    Diameter: Healthy moles tend to be smaller than 0 6 cm in size; an exception are "congenital nevi" that can be larger  Melanomas tend to grow and can often be greater than 0 6 cm (1/4 of an inch, or the size of a pencil eraser)  This is only a guideline, and many normal moles may be larger than 0 6 cm without being unhealthy  Any mole that starts to change in size (small to bigger or bigger to smaller) should be examined promptly    Evolving: Healthy moles tend to "stay the same "  Melanomas may often show signs of change or evolution such as a change in size, shape, color, or elevation  Any mole that starts to itch, bleed, crust, burn, hurt, or ulcerate or demonstrate a change or evolution should be examined promptly by a board certified dermatologist       What are atypical moles or dysplastic nevi? Dysplastic moles are moles that have some of the ABCDE  changes listed above but  are not cancerous  Sometimes a biopsy and microscopic examination are needed to determine the difference   They may indicate an increased risk of melanoma in that person, especially if there is a family history of melanoma  What is a Melanoma? The main concern when looking at a new or changing mole it to evaluate whether it may be a melanoma  The appearance of a "new mole" remains one of the most reliable methods for identifying a malignant melanoma  A melanoma is a type of skin cancer that can be deadly if it spreads throughout the body  The prognosis of a Melanoma depends on how deep it has penetrated in the skin  If caught early, they generally will not have had time to grow into the deeper layers of the skin and they cure rate is then very high  Once the melanoma grows deeper into the skin, the cure rate drops dramatically  Therefore, early detection and removal of a malignant melanoma results in a much better chance of complete cure  VERRUCA VULGARIS ("Common Warts")    Physical Exam:   Anatomic Location Affected:  Right hand    Morphological Description:  1 verrucous papule    Pertinent Positives:   Pertinent Negatives: Additional History of Present Condition:  Growing     Assessment and Plan:  Based on a thorough discussion of this condition and the management approach to it (including a comprehensive discussion of the known risks, side effects and potential benefits of treatment), the patient (family) agrees to implement the following specific plan:   Cryotherapy done in office today   Area will become red and puffy  Scab up and fall off in 1-2 weeks     Verruca Vulgaris  A verruca is a common growth of the skin caused by infection by human papilloma virus (HPV)  There are many strains of the virus that cause different types of warts on the body  The virus infects the most superficial layers of the skin, causing increased production of skin cells and thickening  Warts can be spread through direct contact with infected skin and may spread to other parts of the body if scratched or picked       A verruca is more commonly called a "wart " Warts are particularly common in school-aged children but can arise at any age  Patients who have a history of eczema are especially prone due to impaired skin barrier  Those taking immunosuppressive drugs or with HIV infections may experience prolonged symptoms despite treatment  Warts generally have a rough surface with a tiny black dot sometimes observed in the middle of each scaly spot  They can range in size from a small bump to large scaly growths  Common warts are often found on the backs of fingers or toes, around the nails, and on the knees  Plantar warts can grow inwardly on the soles of the feet causing pain  There are many possible ways to treat warts and sometimes several different treatments are needed to get the warts to go away completely  There is no single perfect treatment for warts, and successful treatment can take many months  In-office treatments usually require multiple visits, and include:  1) Cryotherapy  a cold spray with liquid nitrogen will destroy the infected cells but may lead to discomfort and blistering  It may also leave a permanent white jamil or scar  2) Electrosurgery (curettage and cautery) can be used for large resistant warts which involves shaving the growth down and burning the base  It is performed under local anesthesia and may leave a permanent scar    3) Candida (yeast) antigen injections  These are extracts of the common yeast (Candida) that cannot cause an infection  The medication is injected into/under the wart  It is thought to stimulate the immune system to recognize the wart virus and attack it  Multiple injections are often needed about one month apart  There are also several at-home wart treatments:    1) Soak the warts in warm water for 5 minutes every night followed by gentle filing with a nail file or pumice stone  2) Topical salicylic acid or similar compounds work by removing the dead surface skin cells  a   Apply the medicine directly to the wart, wait for it to dry completely, then cover with duct tape overnight   b  Repeat until the wart is gone, which can take 2-4 months  c  Do not use on the face or groin area   d  If the wart paint makes the skin sore, stop treatment until the discomfort has settled, then recommence as above   e  Take care to keep the chemical off normal skin  3) Podophyllin is a cytotoxic agent used in some products and must not be used in pregnancy or women considering pregnancy  4) Some prescription medications include   a  Topical retinoids (adapalene, tretinoin, tazarotene), 5-fluorouracil (Efudex) or imiquimod (Aldara) creams are sometimes used to treat flat warts or warts on the face and other sensitive anatomical areas  They are usually applied directly to the warts once a day for 2-4 months and can be irritating  These treatments should only be used as directed by your health care provider  b  Systemic treatment with oral cimetidine (Tagamet) may help boost the immune system against the wart virus in patients, some of the time  Initiation of cimetidine therapy should ONLY be done under the supervision of your health care provider, who can discuss possible side effects and drug-to-drug interactions of this specific treatment  PROCEDURE:  DESTRUCTION OF BENIGN LESIONS  After a thorough discussion of treatment options and risk/benefits/alternatives (including but not limited to local pain, scarring, dyspigmentation, blistering, and possible superinfection), verbal and written consent were obtained and the aforementioned lesions were treated on with cryotherapy using liquid nitrogen x 1 cycle for 5-10 seconds   TOTAL NUMBER of 1 lesions were treated today on the ANATOMIC LOCATION: right hand  The patient tolerated the procedure well, and after-care instructions were provided      Scribe Attestation    I,:  Sunitha Hunter am acting as a scribe while in the presence of the attending physician :       I,:  Mundo Kohli MD personally performed the services described in this documentation    as scribed in my presence :

## 2022-04-19 NOTE — PATIENT INSTRUCTIONS
MELANOCYTIC NEVI ("Moles")    Assessment and Plan:  Based on a thorough discussion of this condition and the management approach to it (including a comprehensive discussion of the known risks, side effects and potential benefits of treatment), the patient (family) agrees to implement the following specific plan:   Monitor for changes    When outside we recommend using a wide brim hat, sunglasses, long sleeve and pants, sunscreen with SPF 48+ with reapplication every 2 hours, or SPF specific clothing      Melanocytic Nevi  Melanocytic nevi ("moles") are caused by collections of the color producing skin cells, or melanocytes, in 1 area in the skin  They can range in color from pink to dark brown and be either raised or flat  Some moles are present at birth (I e , "congenital nevi"), while others come up later in life (i e , "acquired nevi")  Juan Gentleman exposure also stimulates the body to make more moles, ie the more sun you get the more moles you'll grow  Clinically distinguishing a healthy mole from melanoma may be difficult  The "ABCDE's" of moles have been suggested as a means of helping to alert a person to a suspicious mole and the possible increased risk of melanoma  Asymmetry: Healthy moles tend to be symmetric, while melanomas are often asymmetric  Asymmetry means if you draw a line through the mole, the two halves do not match in color, size, shape, or surface texture Any mole that starts to demonstrate "asymmetry" should be examined promptly by a board certified dermatologist      Border: Healthy moles tend to have discrete, even borders  The border of a melanoma often blends into the normal skin and does not sharply delineate the mole from normal skin  Any mole that starts to demonstrate "uneven borders" should be examined promptly     Color: Healthy moles tend to be one color throughout  Melanomas tend to be made up of different colors ranging from dark black, blue, white, or red    Any mole that demonstrates a color change should be examined promptly    Diameter: Healthy moles tend to be smaller than 0 6 cm in size; an exception are "congenital nevi" that can be larger  Melanomas tend to grow and can often be greater than 0 6 cm (1/4 of an inch, or the size of a pencil eraser)  This is only a guideline, and many normal moles may be larger than 0 6 cm without being unhealthy  Any mole that starts to change in size (small to bigger or bigger to smaller) should be examined promptly    Evolving: Healthy moles tend to "stay the same "  Melanomas may often show signs of change or evolution such as a change in size, shape, color, or elevation  Any mole that starts to itch, bleed, crust, burn, hurt, or ulcerate or demonstrate a change or evolution should be examined promptly by a board certified dermatologist    [address] ("Common Warts")    Assessment and Plan:  Based on a thorough discussion of this condition and the management approach to it (including a comprehensive discussion of the known risks, side effects and potential benefits of treatment), the patient (family) agrees to implement the following specific plan:   Cryotherapy done in office today  Area will become red and puffy  Scab up and fall off in 1-2 weeks   ROSACEA VERSUS DERMATITIS     Assessment and Plan:  Based on a thorough discussion of this condition and the management approach to it (including a comprehensive discussion of the known risks, side effects and potential benefits of treatment), the patient (family) agrees to implement the following specific plan:   Start metronidazole 0 75% cream twice a day topically to nose for 6 weeks    If no improvement   Will consider biopsy    Follow up in 6 weeks

## 2022-04-26 ENCOUNTER — APPOINTMENT (OUTPATIENT)
Dept: LAB | Facility: MEDICAL CENTER | Age: 47
End: 2022-04-26
Payer: COMMERCIAL

## 2022-04-26 DIAGNOSIS — Z00.8 HEALTH EXAMINATION IN POPULATION SURVEY: ICD-10-CM

## 2022-04-26 LAB
ALBUMIN SERPL BCP-MCNC: 3.8 G/DL (ref 3.5–5)
ALP SERPL-CCNC: 57 U/L (ref 46–116)
ALT SERPL W P-5'-P-CCNC: 22 U/L (ref 12–78)
ANION GAP SERPL CALCULATED.3IONS-SCNC: 1 MMOL/L (ref 4–13)
AST SERPL W P-5'-P-CCNC: 16 U/L (ref 5–45)
BASOPHILS # BLD AUTO: 0.05 THOUSANDS/ΜL (ref 0–0.1)
BASOPHILS NFR BLD AUTO: 1 % (ref 0–1)
BILIRUB SERPL-MCNC: 0.85 MG/DL (ref 0.2–1)
BILIRUB UR QL STRIP: NEGATIVE
BUN SERPL-MCNC: 17 MG/DL (ref 5–25)
CALCIUM SERPL-MCNC: 9 MG/DL (ref 8.3–10.1)
CHLORIDE SERPL-SCNC: 108 MMOL/L (ref 100–108)
CHOLEST SERPL-MCNC: 214 MG/DL
CLARITY UR: NORMAL
CO2 SERPL-SCNC: 31 MMOL/L (ref 21–32)
COLOR UR: NORMAL
CREAT SERPL-MCNC: 0.85 MG/DL (ref 0.6–1.3)
EOSINOPHIL # BLD AUTO: 0.32 THOUSAND/ΜL (ref 0–0.61)
EOSINOPHIL NFR BLD AUTO: 5 % (ref 0–6)
ERYTHROCYTE [DISTWIDTH] IN BLOOD BY AUTOMATED COUNT: 12.2 % (ref 11.6–15.1)
EST. AVERAGE GLUCOSE BLD GHB EST-MCNC: 108 MG/DL
GFR SERPL CREATININE-BSD FRML MDRD: 81 ML/MIN/1.73SQ M
GLUCOSE P FAST SERPL-MCNC: 88 MG/DL (ref 65–99)
GLUCOSE UR STRIP-MCNC: NEGATIVE MG/DL
HBA1C MFR BLD: 5.4 %
HCT VFR BLD AUTO: 42.6 % (ref 34.8–46.1)
HDLC SERPL-MCNC: 61 MG/DL
HGB BLD-MCNC: 14.1 G/DL (ref 11.5–15.4)
HGB UR QL STRIP.AUTO: NEGATIVE
IMM GRANULOCYTES # BLD AUTO: 0.02 THOUSAND/UL (ref 0–0.2)
IMM GRANULOCYTES NFR BLD AUTO: 0 % (ref 0–2)
KETONES UR STRIP-MCNC: NEGATIVE MG/DL
LDLC SERPL CALC-MCNC: 130 MG/DL (ref 0–100)
LEUKOCYTE ESTERASE UR QL STRIP: NEGATIVE
LYMPHOCYTES # BLD AUTO: 1.95 THOUSANDS/ΜL (ref 0.6–4.47)
LYMPHOCYTES NFR BLD AUTO: 29 % (ref 14–44)
MCH RBC QN AUTO: 30.6 PG (ref 26.8–34.3)
MCHC RBC AUTO-ENTMCNC: 33.1 G/DL (ref 31.4–37.4)
MCV RBC AUTO: 92 FL (ref 82–98)
MONOCYTES # BLD AUTO: 0.49 THOUSAND/ΜL (ref 0.17–1.22)
MONOCYTES NFR BLD AUTO: 7 % (ref 4–12)
NEUTROPHILS # BLD AUTO: 3.99 THOUSANDS/ΜL (ref 1.85–7.62)
NEUTS SEG NFR BLD AUTO: 58 % (ref 43–75)
NITRITE UR QL STRIP: NEGATIVE
NONHDLC SERPL-MCNC: 153 MG/DL
NRBC BLD AUTO-RTO: 0 /100 WBCS
PH UR STRIP.AUTO: 5.5 [PH]
PLATELET # BLD AUTO: 251 THOUSANDS/UL (ref 149–390)
PMV BLD AUTO: 10.2 FL (ref 8.9–12.7)
POTASSIUM SERPL-SCNC: 3.9 MMOL/L (ref 3.5–5.3)
PROT SERPL-MCNC: 7.5 G/DL (ref 6.4–8.2)
PROT UR STRIP-MCNC: NEGATIVE MG/DL
RBC # BLD AUTO: 4.61 MILLION/UL (ref 3.81–5.12)
SODIUM SERPL-SCNC: 140 MMOL/L (ref 136–145)
SP GR UR STRIP.AUTO: 1.02 (ref 1–1.03)
TRIGL SERPL-MCNC: 113 MG/DL
TSH SERPL DL<=0.05 MIU/L-ACNC: 1.3 UIU/ML (ref 0.45–4.5)
UROBILINOGEN UR STRIP-ACNC: <2 MG/DL
WBC # BLD AUTO: 6.82 THOUSAND/UL (ref 4.31–10.16)

## 2022-04-26 PROCEDURE — 83036 HEMOGLOBIN GLYCOSYLATED A1C: CPT

## 2022-04-26 PROCEDURE — 82306 VITAMIN D 25 HYDROXY: CPT | Performed by: INTERNAL MEDICINE

## 2022-04-26 PROCEDURE — 36415 COLL VENOUS BLD VENIPUNCTURE: CPT | Performed by: INTERNAL MEDICINE

## 2022-04-26 PROCEDURE — 80061 LIPID PANEL: CPT | Performed by: INTERNAL MEDICINE

## 2022-04-26 PROCEDURE — 84443 ASSAY THYROID STIM HORMONE: CPT | Performed by: INTERNAL MEDICINE

## 2022-04-26 PROCEDURE — 85025 COMPLETE CBC W/AUTO DIFF WBC: CPT | Performed by: INTERNAL MEDICINE

## 2022-04-26 PROCEDURE — 81003 URINALYSIS AUTO W/O SCOPE: CPT | Performed by: INTERNAL MEDICINE

## 2022-04-26 PROCEDURE — 80053 COMPREHEN METABOLIC PANEL: CPT | Performed by: INTERNAL MEDICINE

## 2022-05-05 ENCOUNTER — APPOINTMENT (RX ONLY)
Dept: URBAN - NONMETROPOLITAN AREA CLINIC 4 | Facility: CLINIC | Age: 47
Setting detail: DERMATOLOGY
End: 2022-05-05

## 2022-05-05 DIAGNOSIS — L70.0 ACNE VULGARIS: ICD-10-CM

## 2022-05-05 DIAGNOSIS — L72.8 OTHER FOLLICULAR CYSTS OF THE SKIN AND SUBCUTANEOUS TISSUE: ICD-10-CM

## 2022-05-05 LAB
25(OH)D2 SERPL-MCNC: <1 NG/ML
25(OH)D3 SERPL-MCNC: 50 NG/ML
25(OH)D3+25(OH)D2 SERPL-MCNC: 50 NG/ML

## 2022-05-05 PROCEDURE — ? INTRALESIONAL KENALOG

## 2022-05-05 PROCEDURE — 11900 INJECT SKIN LESIONS </W 7: CPT

## 2022-05-05 PROCEDURE — 99203 OFFICE O/P NEW LOW 30 MIN: CPT | Mod: 25

## 2022-05-05 PROCEDURE — ? PRESCRIPTION

## 2022-05-05 PROCEDURE — ? PRESCRIPTION MEDICATION MANAGEMENT

## 2022-05-05 PROCEDURE — ? COUNSELING

## 2022-05-05 RX ORDER — AMOXICILLIN 500 MG/1
CAPSULE ORAL BID
Qty: 30 | Refills: 1 | Status: ERX | COMMUNITY
Start: 2022-05-05

## 2022-05-05 RX ADMIN — AMOXICILLIN 500: 500 CAPSULE ORAL at 00:00

## 2022-05-05 ASSESSMENT — LOCATION DETAILED DESCRIPTION DERM
LOCATION DETAILED: RIGHT CENTRAL MALAR CHEEK
LOCATION DETAILED: LEFT CENTRAL MALAR CHEEK
LOCATION DETAILED: RIGHT FOREHEAD
LOCATION DETAILED: NASAL SUPRATIP

## 2022-05-05 ASSESSMENT — LOCATION SIMPLE DESCRIPTION DERM
LOCATION SIMPLE: RIGHT FOREHEAD
LOCATION SIMPLE: RIGHT CHEEK
LOCATION SIMPLE: LEFT CHEEK
LOCATION SIMPLE: NOSE

## 2022-05-05 ASSESSMENT — LOCATION ZONE DERM
LOCATION ZONE: FACE
LOCATION ZONE: NOSE

## 2022-05-05 NOTE — PROCEDURE: INTRALESIONAL KENALOG
"Request for medication refill:    Providers if patient needs an appointment and you are willing to give a one month supply please refill for one month and  send a letter/MyChart using \".SMILLIMITEDREFILL\" .smillimited and route chart to \"P SMI \" (Giving one month refill in non controlled medications is strongly recommended before denial)    If refill has been denied, meaning absolutely no refills without visit, please complete the smart phrase \".smirxrefuse\" and route it to the \"P SMI MED REFILLS\"  pool to inform the patient and the pharmacy.    Anh Ramon, EMT        "
Total Volume Injected (Ccs- Only Use Numbers And Decimals): 9cc
Consent: The risks of atrophy were reviewed with the patient.
Concentration Of Solution Injected (Mg/Ml): 3.0
Size Of Lesion (Optional): 0.8
Kenalog Preparation: Kenalog
Include Z78.9 (Other Specified Conditions Influencing Health Status) As An Associated Diagnosis?: No
Detail Level: Detailed
Medical Necessity Clause: This procedure was medically necessary because the lesions that were treated were:
Validate Note Data When Using Inventory: Yes
X Size Of Lesion In Cm (Optional): 0
Administered By (Optional): RAFA

## 2022-05-05 NOTE — HPI: BUMPS
How Severe Are Your Bumps?: moderate
Have Your Bumps Been Treated?: been treated
Is This A New Presentation, Or A Follow-Up?: Bump
When Was It Treated?: [id number]
Additional History: Patient was given Rx for Metrogel but bump has not changed or improved., seen by dermatologist  1 month ago in [place].

## 2022-05-05 NOTE — PROCEDURE: PRESCRIPTION MEDICATION MANAGEMENT
Initiate Treatment: Finacea
Discontinue Regimen: Metrogel
Detail Level: Zone
Render In Strict Bullet Format?: No

## 2022-06-09 ENCOUNTER — APPOINTMENT (RX ONLY)
Dept: URBAN - NONMETROPOLITAN AREA CLINIC 4 | Facility: CLINIC | Age: 47
Setting detail: DERMATOLOGY
End: 2022-06-09

## 2022-06-09 DIAGNOSIS — L72.8 OTHER FOLLICULAR CYSTS OF THE SKIN AND SUBCUTANEOUS TISSUE: ICD-10-CM | Status: IMPROVED

## 2022-06-09 DIAGNOSIS — B07.8 OTHER VIRAL WARTS: ICD-10-CM | Status: INADEQUATELY CONTROLLED

## 2022-06-09 PROCEDURE — ? PRESCRIPTION MEDICATION MANAGEMENT

## 2022-06-09 PROCEDURE — ? PRESCRIPTION

## 2022-06-09 PROCEDURE — ? COUNSELING

## 2022-06-09 PROCEDURE — 17110 DESTRUCTION B9 LES UP TO 14: CPT

## 2022-06-09 PROCEDURE — 11900 INJECT SKIN LESIONS </W 7: CPT | Mod: 59

## 2022-06-09 PROCEDURE — ? INTRALESIONAL KENALOG

## 2022-06-09 PROCEDURE — ? LIQUID NITROGEN

## 2022-06-09 RX ORDER — AMOXICILLIN 500 MG/1
CAPSULE ORAL BID
Qty: 60 | Refills: 0 | Status: ERX

## 2022-06-09 ASSESSMENT — LOCATION ZONE DERM
LOCATION ZONE: FINGER
LOCATION ZONE: NOSE

## 2022-06-09 ASSESSMENT — LOCATION DETAILED DESCRIPTION DERM
LOCATION DETAILED: NASAL SUPRATIP
LOCATION DETAILED: LEFT MID RADIAL DORSAL MIDDLE FINGER

## 2022-06-09 ASSESSMENT — LOCATION SIMPLE DESCRIPTION DERM
LOCATION SIMPLE: NOSE
LOCATION SIMPLE: LEFT MIDDLE FINGER

## 2022-06-09 NOTE — PROCEDURE: PRESCRIPTION MEDICATION MANAGEMENT
Render In Strict Bullet Format?: No
Initiate Treatment: Pandel QD to AA nose
Detail Level: Zone
Continue Regimen: Finacea BID; Amoxicillin 500mg BID

## 2022-06-09 NOTE — PROCEDURE: INTRALESIONAL KENALOG
Validate Note Data When Using Inventory: Yes
Medical Necessity Clause: This procedure was medically necessary because the lesions that were treated were:
Administered By (Optional): RAFA
Total Volume Injected (Ccs- Only Use Numbers And Decimals): 0.3
Concentration Of Solution Injected (Mg/Ml): 3.0
Kenalog Preparation: Kenalog
Treatment Number (Optional): 2
Include Z78.9 (Other Specified Conditions Influencing Health Status) As An Associated Diagnosis?: No
Detail Level: Detailed
Consent: The risks of atrophy were reviewed with the patient.
X Size Of Lesion In Cm (Optional): 0

## 2022-06-09 NOTE — PROCEDURE: LIQUID NITROGEN
Medical Necessity Clause: This procedure was medically necessary because the lesions that were treated were:
Add 52 Modifier (Optional): no
Consent: The patient's consent was obtained including but not limited to risks of crusting, scabbing, blistering, scarring, darker or lighter pigmentary change, recurrence, incomplete removal and infection.
Spray Paint Text: The liquid nitrogen was applied to the skin utilizing a spray paint frosting technique.
Post-Care Instructions: I reviewed with the patient in detail post-care instructions. Patient is to wear sunprotection, and avoid picking at any of the treated lesions. Pt may apply Vaseline to crusted or scabbing areas.
Show Topical Anesthesia Variable?: Yes
Detail Level: Detailed
Number Of Freeze-Thaw Cycles: 1 freeze-thaw cycle
Medical Necessity Information: It is in your best interest to select a reason for this procedure from the list below. All of these items fulfill various CMS LCD requirements except the new and changing color options.

## 2022-08-19 ENCOUNTER — TELEPHONE (OUTPATIENT)
Dept: GASTROENTEROLOGY | Facility: CLINIC | Age: 47
End: 2022-08-19

## 2022-08-19 DIAGNOSIS — Z12.11 SPECIAL SCREENING FOR MALIGNANT NEOPLASMS, COLON: Primary | ICD-10-CM

## 2022-08-19 NOTE — TELEPHONE ENCOUNTER
Scheduled date of colonoscopy (as of today):09 27 22  Physician performing colonoscopy:DR MELARA  Location of colonoscopy:[facility]  Bowel prep reviewed with patient:CHLOE  Instructions reviewed with patient by:RONALD VERBALLY/MAILED  Clearances: N/A      08/19/22  Screened by: Carlos James    Referring Provider DR MARINELLI    Pre- Screening: Body mass index is 25 02 kg/m²  Has patient been referred for a routine screening Colonoscopy? yes  Is the patient between 39-70 years old? yes      Previous Colonoscopy no   If yes:    Date:     Facility:     Reason:       SCHEDULING STAFF: If the patient is between 45yrs-49yrs, please advise patient to confirm benefits/coverage with their insurance company for a routine screening colonoscopy, some insurance carriers will only cover at [address] or older  If the patient is over 66years old, please schedule an office visit  Does the patient want to see a Gastroenterologist prior to their procedure OR are they having any GI symptoms? no    Has the patient been hospitalized or had abdominal surgery in the past 6 months? no    Does the patient use supplemental oxygen? no    Does the patient take Coumadin, Lovenox, Plavix, Elliquis, Xarelto, or other blood thinning medication? no    Has the patient had a stroke, cardiac event, or stent placed in the past year? no    SCHEDULING STAFF: If patient answers NO to above questions, then schedule procedure  If patient answers YES to above questions, then schedule office appointment  PT PASSED OA AND CAN BE REACHED [phone] -PT WOULD LIKE TO [facility]    If patient is between 45yrs - 49yrs, please advise patient that we will have to confirm benefits & coverage with their insurance company for a routine screening colonoscopy

## 2022-09-26 RX ORDER — SODIUM CHLORIDE 9 MG/ML
125 INJECTION, SOLUTION INTRAVENOUS CONTINUOUS
Status: CANCELLED | OUTPATIENT
Start: 2022-09-26

## 2022-09-27 ENCOUNTER — HOSPITAL ENCOUNTER (OUTPATIENT)
Dept: GASTROENTEROLOGY | Facility: MEDICAL CENTER | Age: 47
Setting detail: OUTPATIENT SURGERY
Discharge: HOME | End: 2022-09-27
Attending: INTERNAL MEDICINE
Payer: COMMERCIAL

## 2022-09-27 ENCOUNTER — ANESTHESIA (OUTPATIENT)
Dept: GASTROENTEROLOGY | Facility: MEDICAL CENTER | Age: 47
End: 2022-09-27

## 2022-09-27 ENCOUNTER — ANESTHESIA EVENT (OUTPATIENT)
Dept: GASTROENTEROLOGY | Facility: MEDICAL CENTER | Age: 47
End: 2022-09-27

## 2022-09-27 VITALS
DIASTOLIC BLOOD PRESSURE: 77 MMHG | HEIGHT: 66 IN | TEMPERATURE: 98.6 F | OXYGEN SATURATION: 100 % | BODY MASS INDEX: 24.11 KG/M2 | RESPIRATION RATE: 18 BRPM | WEIGHT: 150 LBS | HEART RATE: 76 BPM | SYSTOLIC BLOOD PRESSURE: 127 MMHG

## 2022-09-27 DIAGNOSIS — Z12.11 SPECIAL SCREENING FOR MALIGNANT NEOPLASMS, COLON: ICD-10-CM

## 2022-09-27 PROBLEM — Z78.9 ALCOHOL CONSUMPTION TWO TO FOUR DAYS PER WEEK: Status: ACTIVE | Noted: 2022-09-27

## 2022-09-27 LAB
EXT PREGNANCY TEST URINE: NEGATIVE
EXT. CONTROL: NORMAL

## 2022-09-27 PROCEDURE — 45385 COLONOSCOPY W/LESION REMOVAL: CPT | Performed by: INTERNAL MEDICINE

## 2022-09-27 PROCEDURE — 81025 URINE PREGNANCY TEST: CPT | Performed by: ANESTHESIOLOGY

## 2022-09-27 PROCEDURE — 88305 TISSUE EXAM BY PATHOLOGIST: CPT | Performed by: PATHOLOGY

## 2022-09-27 RX ORDER — PROPOFOL 10 MG/ML
INJECTION, EMULSION INTRAVENOUS AS NEEDED
Status: DISCONTINUED | OUTPATIENT
Start: 2022-09-27 | End: 2022-09-27

## 2022-09-27 RX ORDER — SODIUM CHLORIDE 9 MG/ML
125 INJECTION, SOLUTION INTRAVENOUS CONTINUOUS
Status: DISCONTINUED | OUTPATIENT
Start: 2022-09-27 | End: 2022-10-01 | Stop reason: HOSPADM

## 2022-09-27 RX ORDER — LIDOCAINE HYDROCHLORIDE 20 MG/ML
INJECTION, SOLUTION EPIDURAL; INFILTRATION; INTRACAUDAL; PERINEURAL AS NEEDED
Status: DISCONTINUED | OUTPATIENT
Start: 2022-09-27 | End: 2022-09-27

## 2022-09-27 RX ADMIN — PROPOFOL 50 MG: 10 INJECTION, EMULSION INTRAVENOUS at 15:06

## 2022-09-27 RX ADMIN — PROPOFOL 50 MG: 10 INJECTION, EMULSION INTRAVENOUS at 15:14

## 2022-09-27 RX ADMIN — PROPOFOL 50 MG: 10 INJECTION, EMULSION INTRAVENOUS at 15:11

## 2022-09-27 RX ADMIN — SODIUM CHLORIDE 125 ML/HR: 0.9 INJECTION, SOLUTION INTRAVENOUS at 14:46

## 2022-09-27 RX ADMIN — PROPOFOL 50 MG: 10 INJECTION, EMULSION INTRAVENOUS at 15:08

## 2022-09-27 RX ADMIN — PROPOFOL 100 MG: 10 INJECTION, EMULSION INTRAVENOUS at 15:04

## 2022-09-27 RX ADMIN — LIDOCAINE HYDROCHLORIDE 60 MG: 20 INJECTION, SOLUTION EPIDURAL; INFILTRATION; INTRACAUDAL; PERINEURAL at 15:04

## 2022-09-27 RX ADMIN — Medication 40 MG: at 15:06

## 2022-09-27 NOTE — H&P
History and Physical -  [facility]  Agnes Eaton 50 y o  female MRN: [id number]    HPI: Agnes Eaton is a 50y o  year old female who presents for screening colonoscopy      Review of Systems    Historical Information   Past Medical History:   Diagnosis Date    Back pain     CVA (cerebral vascular accident) ([address] )     Palpitation     TIA (transient ischemic attack)     Ventral hernia      History reviewed  No pertinent surgical history    Social History   Social History     Substance and Sexual Activity   Alcohol Use Yes    Alcohol/week: 2 0 standard drinks    Types: 2 Glasses of wine per week     Social History     Substance and Sexual Activity   Drug Use No     Social History     Tobacco Use   Smoking Status Never Smoker   Smokeless Tobacco Never Used     Family History   Problem Relation Age of Onset    No Known Problems Mother     Prostate cancer Father 79    Cancer Father         Prostate    No Known Problems Maternal Grandmother     No Known Problems Maternal Grandfather     No Known Problems Paternal Grandmother     No Known Problems Paternal Grandfather     No Known Problems Maternal Aunt     No Known Problems Paternal Aunt     BRCA2 Negative Neg Hx     BRCA2 Positive Neg Hx     BRCA1 Positive Neg Hx     BRCA1 Negative Neg Hx     BRCA 1/2 Neg Hx     Ovarian cancer Neg Hx     Endometrial cancer Neg Hx     Colon cancer Neg Hx     Breast cancer additional onset Neg Hx     Breast cancer Neg Hx        Meds/Allergies     (Not in a hospital admission)      No Known Allergies    Objective     /64   Pulse 75   Temp 98 6 °F (37 °C)   Resp 20   Ht 5' 6" (1 676 m)   Wt 68 kg (150 lb)   SpO2 100%   BMI 24 21 kg/m²       PHYSICAL EXAM    Gen: NAD  CV: RRR  CHEST: Clear  ABD: soft, NT/ND  EXT: no edema  Neuro: AAO      ASSESSMENT/PLAN:  This is a 50y o  year old female here for screening colonoscopy    PLAN:   Procedure:  Screening colonoscopy

## 2022-09-27 NOTE — ANESTHESIA POSTPROCEDURE EVALUATION
Post-Op Assessment Note    CV Status:  Stable    Pain management: adequate     Mental Status:  Alert and awake   Hydration Status:  Euvolemic   PONV Controlled:  Controlled   Airway Patency:  Patent   Two or more mitigation strategies used for obstructive sleep apnea   Post Op Vitals Reviewed: Yes      Staff: Anesthesiologist         No complications documented      /77 (09/27/22 1536)    Temp      Pulse 76 (09/27/22 1536)   Resp 18 (09/27/22 1536)    SpO2 100 % (09/27/22 1536)

## 2022-09-27 NOTE — ANESTHESIA PREPROCEDURE EVALUATION
Procedure:  COLONOSCOPY    Relevant Problems   ANESTHESIA (within normal limits)      CARDIO (within normal limits)      ENDO (within normal limits)      GI/HEPATIC (within normal limits)      /RENAL (within normal limits)      GYN (within normal limits)      HEMATOLOGY (within normal limits)      MUSCULOSKELETAL (within normal limits)      NEURO/PSYCH (within normal limits)      PULMONARY (within normal limits)      Other   (+) Alcohol consumption two to four days per week        Physical Exam    Airway    Mallampati score: I  TM Distance: >3 FB  Neck ROM: full     Dental       Cardiovascular  Rhythm: regular, Rate: normal,     Pulmonary  Breath sounds clear to auscultation,     Other Findings        Anesthesia Plan  ASA Score- 2     Anesthesia Type- IV sedation with anesthesia with ASA Monitors  Additional Monitors:   Airway Plan:           Plan Factors-Exercise tolerance (METS): >4 METS  Chart reviewed  Patient summary reviewed  Patient is not a current smoker  Induction- intravenous  Postoperative Plan-     Informed Consent- Anesthetic plan and risks discussed with patient

## 2022-10-04 PROCEDURE — 88305 TISSUE EXAM BY PATHOLOGIST: CPT | Performed by: PATHOLOGY

## 2022-12-17 ENCOUNTER — HOSPITAL ENCOUNTER (OUTPATIENT)
Dept: MAMMOGRAPHY | Facility: MEDICAL CENTER | Age: 47
Discharge: HOME | End: 2022-12-17

## 2022-12-17 VITALS — WEIGHT: 149.91 LBS | BODY MASS INDEX: 24.09 KG/M2 | HEIGHT: 66 IN

## 2022-12-17 DIAGNOSIS — Z12.31 ENCOUNTER FOR SCREENING MAMMOGRAM FOR MALIGNANT NEOPLASM OF BREAST: ICD-10-CM

## 2023-02-17 NOTE — PROGRESS NOTES
Daily Note     Today's date: 2/15/2022  Patient name: Khalida Scott  : 1975  MRN: [id number]  Referring provider: Omari Johnson DO  Dx:   Encounter Diagnosis     ICD-10-CM    1  Cervical radiculopathy  M54 12    2  Myofascial pain syndrome  M79 18    3  Neck pain  M54 2    4  Acute neck pain  M54 2                   Subjective: Patient notes was feeling better and then this morning had some increase in tingling in her hand again        Objective: See treatment diary below      L shear limited T1/T2   -more so with right rotation       Precautions: none     Manuals 1/17 1/21 1/25 1/27 2/1 2/3 2/7 2/10 2/15    Cervical manuals If needed assessed   [facility] Assessed and symptomatic most levels      Supine cervical retraction w/ traction w/ extension   MK          thoracic   Seated CT 1x w/ cavs  seated CT 1x w/ cavs    Seated CT 1x w/ cavs Seated CT 1x w/ cavs, prone screw home 1x w/ cavs Prone CT B w/ cavs, L shear correction T1/T2 mob MK/ND     Rib mobs    supine 1st and 2nd  [address]  supine 1st and 2nd  [address] supine 1st and 2nd  [address]     [place] STM    [address]                                  Neuro Re-Ed      2/3       Chin tucks   10x 5" holds  12x 5" holds  5"x15 5"x20 -                   1/2 foam series     1' ea:  pec stretch  Hugs  Snow angels   Scissors  5" punches 1' ea:  pec stretch  Hugs  Snow angels   Scissors  5" punches                    Sitting and sleeping posture r reviewed           Ther Ex      2/3       Prone cervical extension w/ self overpressure r            TB b/l ER     GTB 5"x15 GTB 5"x20       Self first rib mob         With L shear and R UE abd slightly w/ palm facing forward    UBE  3 min bwd  3 min bwd  3 min bwd  3' bw 3' bw 3 min  3 min      Standing straight arm pull downs   2x10 grn  2x10 grn  GTB 20x GTB 20x       Standing horizontal abd   2x10 grn  2x10 grn GTB 20x GTB 20x       Supine thoracic ext over horizontal   1 5 min  1 5 min  2' towel 2 5'  1/2 foam 2 min half roll  2 min half roll      Seated CT butterfly stretch    10x 3" holds  5"x10 5"x10 10x 5" holds 10x 5" holds Standing against wall or supine CT stretch w/ anterior shear and chin tuck     pec minor stretch over vertical half roll        5x 20" holds  5x 20" holds      Wall wrist flexion/carpal tunnel stretch         Reviewed                                   Assessment: Tolerated treatment well  Patient demonstrated fatigue post treatment, exhibited good technique with therapeutic exercises and would benefit from continued PT    Plan: Continue per plan of care  Progress treatment as tolerated      Colen Bence, PT normal...

## 2023-02-23 ENCOUNTER — APPOINTMENT (RX ONLY)
Dept: URBAN - NONMETROPOLITAN AREA CLINIC 4 | Facility: CLINIC | Age: 48
Setting detail: DERMATOLOGY
End: 2023-02-23

## 2023-02-23 DIAGNOSIS — L73.9 FOLLICULAR DISORDER, UNSPECIFIED: ICD-10-CM

## 2023-02-23 DIAGNOSIS — L663 OTHER SPECIFIED DISEASES OF HAIR AND HAIR FOLLICLES: ICD-10-CM

## 2023-02-23 DIAGNOSIS — L738 OTHER SPECIFIED DISEASES OF HAIR AND HAIR FOLLICLES: ICD-10-CM

## 2023-02-23 PROBLEM — L02.223 FURUNCLE OF CHEST WALL: Status: ACTIVE | Noted: 2023-02-23

## 2023-02-23 PROCEDURE — 99213 OFFICE O/P EST LOW 20 MIN: CPT

## 2023-02-23 PROCEDURE — ? TREATMENT REGIMEN

## 2023-02-23 PROCEDURE — ? PRESCRIPTION

## 2023-02-23 PROCEDURE — ? COUNSELING

## 2023-02-23 RX ORDER — CLINDAMYCIN PHOSPHATE 10 MG/ML
1% LOTION TOPICAL QDAY
Qty: 60 | Refills: 3 | Status: ERX | COMMUNITY
Start: 2023-02-23

## 2023-02-23 RX ADMIN — CLINDAMYCIN PHOSPHATE 1%: 10 LOTION TOPICAL at 00:00

## 2023-02-23 ASSESSMENT — LOCATION SIMPLE DESCRIPTION DERM: LOCATION SIMPLE: CHEST

## 2023-02-23 ASSESSMENT — PAIN INTENSITY VAS: HOW INTENSE IS YOUR PAIN 0 BEING NO PAIN, 10 BEING THE MOST SEVERE PAIN POSSIBLE?: 1/10 PAIN

## 2023-02-23 ASSESSMENT — BSA RASH: BSA RASH: 1

## 2023-02-23 ASSESSMENT — LOCATION DETAILED DESCRIPTION DERM: LOCATION DETAILED: LEFT MEDIAL SUPERIOR CHEST

## 2023-02-23 ASSESSMENT — LOCATION ZONE DERM: LOCATION ZONE: TRUNK

## 2023-02-23 ASSESSMENT — SEVERITY ASSESSMENT: SEVERITY: MILD

## 2023-03-02 ENCOUNTER — ANNUAL EXAM (OUTPATIENT)
Dept: OBGYN CLINIC | Facility: CLINIC | Age: 48
End: 2023-03-02

## 2023-03-02 VITALS
BODY MASS INDEX: 25.71 KG/M2 | WEIGHT: 160 LBS | SYSTOLIC BLOOD PRESSURE: 120 MMHG | HEIGHT: 66 IN | DIASTOLIC BLOOD PRESSURE: 78 MMHG

## 2023-03-02 DIAGNOSIS — Z00.00 BLOOD TESTS FOR ROUTINE GENERAL PHYSICAL EXAMINATION: ICD-10-CM

## 2023-03-02 DIAGNOSIS — Z01.419 ENCOUNTER FOR WELL WOMAN EXAM WITH ROUTINE GYNECOLOGICAL EXAM: Primary | ICD-10-CM

## 2023-03-02 DIAGNOSIS — Z12.31 ENCOUNTER FOR SCREENING MAMMOGRAM FOR MALIGNANT NEOPLASM OF BREAST: ICD-10-CM

## 2023-03-02 DIAGNOSIS — R63.5 WEIGHT GAIN: ICD-10-CM

## 2023-03-02 RX ORDER — CLINDAMYCIN PHOSPHATE 10 UG/ML
LOTION TOPICAL
COMMUNITY
Start: 2023-02-23

## 2023-03-02 NOTE — PROGRESS NOTES
ASSESSMENT & PLAN: Paz Moncada is a 50 y o   with normal gynecologic exam     1   Routine well woman exam done today  2  Pap and HPV:  The patient's last pap was   It was normal   Pap with reflex cotesting was done today  Current ASCCP Guidelines reviewed  3   Mammogram ordered  4  The following were reviewed in today's visit: breast self exam and mammography screening ordered  5  Contraception: none    CC:  Annual Gynecologic Examination    HPI: Paz Moncada is a 50 y o  Jessy Punt who presents for annual gynecologic examination  She has the following concerns: reports weight gain, no changes in diet or exercise regimen     Health Maintenance:    She wears her seatbelt routinely  She does perform regular monthly self breast exams  She feels safe at home  Patient Active Problem List   Diagnosis   (none) - all problems resolved or deleted       Past Medical History:   Diagnosis Date   • Back pain    • Varicella        History reviewed  No pertinent surgical history  Past OB/Gyn History:  OB History        0    Para   0    Term   0       0    AB   0    Living   0       SAB   0    IAB   0    Ectopic   0    Multiple   0    Live Births   0                  Pt does not have menstrual issues     History of sexually transmitted infection: No   History of abnormal pap smears: No       Patient is not currently sexually active  heterosexual  The current method of family planning is none      Family History   Problem Relation Age of Onset   • Heart disease Mother         AFib   • Prostate cancer Father 79   • Cancer Father         Prostate   • Prostate cancer Brother    • No Known Problems Maternal Grandmother    • No Known Problems Maternal Grandfather    • Cancer Paternal Grandmother         Pancreatic Cancer   • Colon cancer Paternal Grandfather    • No Known Problems Maternal Aunt    • No Known Problems Paternal Aunt    • BRCA2 Negative Neg Hx    • BRCA2 Positive Neg Hx • BRCA1 Positive Neg Hx    • BRCA1 Negative Neg Hx    • BRCA 1/2 Neg Hx    • Ovarian cancer Neg Hx    • Endometrial cancer Neg Hx    • Breast cancer additional onset Neg Hx    • Breast cancer Neg Hx        Social History:  Social History     Socioeconomic History   • Marital status: Single     Spouse name: Not on file   • Number of children: Not on file   • Years of education: Not on file   • Highest education level: Not on file   Occupational History   • Not on file   Tobacco Use   • Smoking status: Never   • Smokeless tobacco: Never   Vaping Use   • Vaping Use: Never used   Substance and Sexual Activity   • Alcohol use:  Yes     Alcohol/week: 2 0 standard drinks     Types: 2 Glasses of wine per week   • Drug use: Never   • Sexual activity: Not Currently     Partners: Male     Birth control/protection: Abstinence, Condom Male   Other Topics Concern   • Not on file   Social History Narrative    Pt states she has coffee daily     Social Determinants of Health     Financial Resource Strain: Not on file   Food Insecurity: Not on file   Transportation Needs: Not on file   Physical Activity: Not on file   Stress: Not on file   Social Connections: Not on file   Intimate Partner Violence: Not on file   Housing Stability: Not on file     No Known Allergies    Current Outpatient Medications:   •  ascorbic acid (VITAMIN C) 500 mg tablet, Take 500 mg by mouth daily, Disp: , Rfl:   •  Calcium Carbonate 1500 (600 Ca) MG TABS, Take by mouth, Disp: , Rfl:   •  Cholecalciferol (VITAMIN D3) 1000 units CAPS, Take by mouth, Disp: , Rfl:   •  clindamycin (CLEOCIN T) 1 % lotion, APPLY A THIN LAYER ONCE DAILY TO AFFECTED AREA OF CHEST AS NEEDED FOR FLARE, Disp: , Rfl:   •  polyethylene glycol (GOLYTELY) 4000 mL solution, Take 4,000 mL by mouth once for 1 dose Take as directed by office, Disp: 4000 mL, Rfl: 0    Review of Systems:    Review of Systems  Constitutional :no fever, feels well, no tiredness, no recent weight gain or loss  ENT: no ear ache, no loss of hearing, no nosebleeds or nasal discharge, no sore throat or hoarseness  Cardiovascular: no complaints of slow or fast heart beat, no chest pain, no palpitations, no leg claudication or lower extremity edema  Respiratory: no complaints of shortness of shortness of breath, no HOWARD  Breasts:no complaints of breast pain, breast lump, or nipple discharge  Gastrointestinal: no complaints of abdominal pain, constipation, nausea, vomiting, or diarrhea or bloody stools  Genitourinary : no complaints of dysuria, incontinence, pelvic pain, no dysmenorrhea, vaginal discharge or abnormal vaginal bleeding and as noted in HPI  Musculoskeletal: no complaints of arthralgia, no myalgia, no joint swelling or stiffness, no limb pain or swelling  Integumentary: no complaints of skin rash or lesion, itching or dry skin  Neurological: no complaints of headache, no confusion, no numbness or tingling, no dizziness or fainting    Objective      /78 (BP Location: Left arm)   Ht 5' 6" (1 676 m)   Wt 72 6 kg (160 lb)   LMP 10/23/2022   BMI 25 82 kg/m²     General:   appears stated age, cooperative, alert normal mood and affect   Neck: normal, supple,trachea midline, no masses   Heart: regular rate and rhythm, S1, S2 normal, no murmur, click, rub or gallop   Lungs: clear to auscultation bilaterally   Breasts: normal appearance, no masses or tenderness, Inspection negative, No nipple retraction or dimpling, No nipple discharge or bleeding, No axillary or supraclavicular adenopathy, Normal to palpation without dominant masses   Abdomen: soft, non-tender, without masses or organomegaly   Vulva: normal female genitalia, Bartholin's, Urethra, [place] normal, no lesions, normal female hair distribution   Vagina: normal vagina, no discharge, exudate, lesion, or erythema   Urethra: normal   Cervix: Normal, no discharge  PAP done     Uterus: normal size, contour, position, consistency, mobility, non-tender Adnexa: normal adnexa and no mass, fullness, tenderness   Lymphatic palpation of lymph nodes in neck, axilla, groin and/or other locations: no lymphadenopathy or masses noted   Skin normal skin turgor and no rashes     Psychiatric orientation to person, place, and time: normal  mood and affect: normal

## 2023-03-06 ENCOUNTER — LAB (OUTPATIENT)
Dept: LAB | Facility: MEDICAL CENTER | Age: 48
End: 2023-03-06

## 2023-03-06 DIAGNOSIS — R63.5 WEIGHT GAIN: ICD-10-CM

## 2023-03-06 DIAGNOSIS — Z00.00 BLOOD TESTS FOR ROUTINE GENERAL PHYSICAL EXAMINATION: ICD-10-CM

## 2023-03-06 LAB
ALBUMIN SERPL BCP-MCNC: 4 G/DL (ref 3.5–5)
ALP SERPL-CCNC: 58 U/L (ref 46–116)
ALT SERPL W P-5'-P-CCNC: 22 U/L (ref 12–78)
ANION GAP SERPL CALCULATED.3IONS-SCNC: 6 MMOL/L (ref 4–13)
AST SERPL W P-5'-P-CCNC: 20 U/L (ref 5–45)
BASOPHILS # BLD AUTO: 0.05 THOUSANDS/ÂΜL (ref 0–0.1)
BASOPHILS NFR BLD AUTO: 1 % (ref 0–1)
BILIRUB SERPL-MCNC: 1 MG/DL (ref 0.2–1)
BUN SERPL-MCNC: 17 MG/DL (ref 5–25)
CALCIUM SERPL-MCNC: 9.1 MG/DL (ref 8.3–10.1)
CHLORIDE SERPL-SCNC: 106 MMOL/L (ref 96–108)
CHOLEST SERPL-MCNC: 264 MG/DL
CO2 SERPL-SCNC: 25 MMOL/L (ref 21–32)
CREAT SERPL-MCNC: 0.88 MG/DL (ref 0.6–1.3)
EOSINOPHIL # BLD AUTO: 0.24 THOUSAND/ÂΜL (ref 0–0.61)
EOSINOPHIL NFR BLD AUTO: 4 % (ref 0–6)
ERYTHROCYTE [DISTWIDTH] IN BLOOD BY AUTOMATED COUNT: 12 % (ref 11.6–15.1)
EST. AVERAGE GLUCOSE BLD GHB EST-MCNC: 103 MG/DL
GFR SERPL CREATININE-BSD FRML MDRD: 77 ML/MIN/1.73SQ M
GLUCOSE P FAST SERPL-MCNC: 84 MG/DL (ref 65–99)
HBA1C MFR BLD: 5.2 %
HCT VFR BLD AUTO: 44 % (ref 34.8–46.1)
HDLC SERPL-MCNC: 64 MG/DL
HGB BLD-MCNC: 13.9 G/DL (ref 11.5–15.4)
HPV HR 12 DNA CVX QL NAA+PROBE: NEGATIVE
HPV16 DNA CVX QL NAA+PROBE: NEGATIVE
HPV18 DNA CVX QL NAA+PROBE: NEGATIVE
IMM GRANULOCYTES # BLD AUTO: 0.03 THOUSAND/UL (ref 0–0.2)
IMM GRANULOCYTES NFR BLD AUTO: 1 % (ref 0–2)
LDLC SERPL CALC-MCNC: 175 MG/DL (ref 0–100)
LYMPHOCYTES # BLD AUTO: 1.89 THOUSANDS/ÂΜL (ref 0.6–4.47)
LYMPHOCYTES NFR BLD AUTO: 29 % (ref 14–44)
MCH RBC QN AUTO: 29.3 PG (ref 26.8–34.3)
MCHC RBC AUTO-ENTMCNC: 31.6 G/DL (ref 31.4–37.4)
MCV RBC AUTO: 93 FL (ref 82–98)
MONOCYTES # BLD AUTO: 0.53 THOUSAND/ÂΜL (ref 0.17–1.22)
MONOCYTES NFR BLD AUTO: 8 % (ref 4–12)
NEUTROPHILS # BLD AUTO: 3.89 THOUSANDS/ÂΜL (ref 1.85–7.62)
NEUTS SEG NFR BLD AUTO: 57 % (ref 43–75)
NONHDLC SERPL-MCNC: 200 MG/DL
NRBC BLD AUTO-RTO: 0 /100 WBCS
PLATELET # BLD AUTO: 259 THOUSANDS/UL (ref 149–390)
PMV BLD AUTO: 10.6 FL (ref 8.9–12.7)
POTASSIUM SERPL-SCNC: 4 MMOL/L (ref 3.5–5.3)
PROT SERPL-MCNC: 7.5 G/DL (ref 6.4–8.4)
RBC # BLD AUTO: 4.74 MILLION/UL (ref 3.81–5.12)
SODIUM SERPL-SCNC: 137 MMOL/L (ref 135–147)
TRIGL SERPL-MCNC: 127 MG/DL
TSH SERPL DL<=0.05 MIU/L-ACNC: 1.76 UIU/ML (ref 0.45–4.5)
WBC # BLD AUTO: 6.63 THOUSAND/UL (ref 4.31–10.16)

## 2023-03-10 LAB
LAB AP GYN PRIMARY INTERPRETATION: NORMAL
Lab: NORMAL

## 2023-05-05 ENCOUNTER — OFFICE VISIT (OUTPATIENT)
Dept: FAMILY MEDICINE CLINIC | Facility: CLINIC | Age: 48
End: 2023-05-05

## 2023-05-05 VITALS
WEIGHT: 153 LBS | TEMPERATURE: 98.1 F | DIASTOLIC BLOOD PRESSURE: 78 MMHG | HEART RATE: 72 BPM | RESPIRATION RATE: 18 BRPM | BODY MASS INDEX: 24.59 KG/M2 | SYSTOLIC BLOOD PRESSURE: 110 MMHG | OXYGEN SATURATION: 98 % | HEIGHT: 66 IN

## 2023-05-05 DIAGNOSIS — Z11.59 NEED FOR HEPATITIS C SCREENING TEST: ICD-10-CM

## 2023-05-05 DIAGNOSIS — R07.9 CHEST PAIN, UNSPECIFIED TYPE: ICD-10-CM

## 2023-05-05 DIAGNOSIS — R63.5 WEIGHT GAIN: ICD-10-CM

## 2023-05-05 DIAGNOSIS — E78.5 HYPERLIPIDEMIA, UNSPECIFIED HYPERLIPIDEMIA TYPE: ICD-10-CM

## 2023-05-05 DIAGNOSIS — Z00.00 ANNUAL PHYSICAL EXAM: Primary | ICD-10-CM

## 2023-05-05 DIAGNOSIS — Z11.4 SCREENING FOR HIV (HUMAN IMMUNODEFICIENCY VIRUS): ICD-10-CM

## 2023-05-05 NOTE — PROGRESS NOTES
[address] [facility] PRIMARY CARE [facility]    NAME: Kade Bonilla  AGE: 50 y o  SEX: female  : 1975     DATE: 2023     Assessment and Plan:     Problem List Items Addressed This Visit    None  Visit Diagnoses     Annual physical exam    -  Primary    Hyperlipidemia, unspecified hyperlipidemia type        Relevant Orders    Echo stress test, exercise    Comprehensive metabolic panel    Lipid panel    Ambulatory Referral to Cardiology    Chest pain, unspecified type        Relevant Orders    Echo stress test, exercise    US thyroid    Ambulatory Referral to Cardiology    Weight gain        Relevant Orders    Ambulatory Referral to Weight Management    US thyroid          Immunizations and preventive care screenings were discussed with patient today  Appropriate education was printed on patient's after visit summary  Counseling:  · See below  Patient is here for annual physical   Recent lab studies were discussed with patient  Cholesterol is quite elevated to 175 from 130 the year before  Heart rate last visit about a year ago was 157 and she is 153 today  Does admit to some dietary indiscretion infrequently  We discussed making some adjustments and repeating blood work in 6 weeks  Weight management would also be good to have some advice about her weight loss strategies  There is family history of hyperlipidemia though no coronary artery disease  Patient does express chest discomfort retrosternal lasting for 5 minutes with no radiation shortness of breath lightheadedness palpitation  No tenderness on palpation  EKG in the office was unremarkable  A stress echocardiogram would be ordered  Cardiology consultation as requested  To note patient does not report dyspepsia  She takes NSAIDs only infrequently  She is up-to-date with GYN and will be due for mammogram end of the year    Her colonoscopy was unremarkable and patient was asked to come back in 10 years  No follow-ups on file  Chief Complaint:     Chief Complaint   Patient presents with    Physical Exam     Bmi follow up     chest discomfort     Every so often has some chest discomfort     mid back pain      History of Present Illness:     Adult Annual Physical   Patient here for a comprehensive physical exam  The patient reports problems - See below  Diet and Physical Activity  · Diet/Nutrition: See above  · Exercise: walking  Depression Screening  PHQ-2/9 Depression Screening    Little interest or pleasure in doing things: 1 - several days  Feeling down, depressed, or hopeless: 0 - not at all  PHQ-2 Score: 1  PHQ-2 Interpretation: Negative depression screen       General Health  · Sleep: sleeps well  · Hearing: normal - bilateral   · Vision: no vision problems  · Dental: regular dental visits  /GYN Health  · Patient is: perimenopausal  · Last menstrual period: As above  · Contraceptive method: As above  Review of Systems:     Review of Systems   Past Medical History:     Past Medical History:   Diagnosis Date    Back pain     Varicella       Past Surgical History:     History reviewed  No pertinent surgical history  Social History:     Social History     Socioeconomic History    Marital status: Single     Spouse name: None    Number of children: None    Years of education: None    Highest education level: None   Occupational History    None   Tobacco Use    Smoking status: Never    Smokeless tobacco: Never   Vaping Use    Vaping Use: Never used   Substance and Sexual Activity    Alcohol use:  Yes     Alcohol/week: 2 0 standard drinks     Types: 2 Glasses of wine per week    Drug use: Never    Sexual activity: Not Currently     Partners: Male     Birth control/protection: Abstinence, Condom Male   Other Topics Concern    None   Social History Narrative    Pt states she has coffee daily     Social Determinants of "Health     Financial Resource Strain: Not on file   Food Insecurity: Not on file   Transportation Needs: Not on file   Physical Activity: Not on file   Stress: Not on file   Social Connections: Not on file   Intimate Partner Violence: Not on file   Housing Stability: Not on file      Family History:     Family History   Problem Relation Age of Onset    Heart disease Mother         AFib    Prostate cancer Father 79    Cancer Father         Prostate    Prostate cancer Brother     No Known Problems Maternal Grandmother     No Known Problems Maternal Grandfather     Cancer Paternal Grandmother         Pancreatic Cancer    Colon cancer Paternal Grandfather     No Known Problems Maternal Aunt     No Known Problems Paternal Aunt     BRCA2 Negative Neg Hx     BRCA2 Positive Neg Hx     BRCA1 Positive Neg Hx     BRCA1 Negative Neg Hx     BRCA 1/2 Neg Hx     Ovarian cancer Neg Hx     Endometrial cancer Neg Hx     Breast cancer additional onset Neg Hx     Breast cancer Neg Hx       Current Medications:     Current Outpatient Medications   Medication Sig Dispense Refill    ascorbic acid (VITAMIN C) 500 mg tablet Take 500 mg by mouth daily      Calcium Carbonate 1500 (600 Ca) MG TABS Take by mouth      Cholecalciferol (VITAMIN D3) 1000 units CAPS Take by mouth      clindamycin (CLEOCIN T) 1 % lotion APPLY A THIN LAYER ONCE DAILY TO AFFECTED AREA OF CHEST AS NEEDED FOR FLARE      polyethylene glycol (GOLYTELY) 4000 mL solution Take 4,000 mL by mouth once for 1 dose Take as directed by office 4000 mL 0     No current facility-administered medications for this visit  Allergies:     No Known Allergies   Physical Exam:     /78   Pulse 72   Temp 98 1 °F (36 7 °C)   Resp 18   Ht 5' 6\" (1 676 m)   Wt 69 4 kg (153 lb)   SpO2 98%   BMI 24 69 kg/m²     Physical Exam  Constitutional:       General: She is not in acute distress  Appearance: She is not diaphoretic     HENT:      Mouth/Throat:      " Pharynx: No posterior oropharyngeal erythema  Eyes:      General: No scleral icterus  Conjunctiva/sclera: Conjunctivae normal    Neck:      Thyroid: No thyromegaly  Vascular: No carotid bruit  Cardiovascular:      Rate and Rhythm: Normal rate and regular rhythm  Heart sounds: Normal heart sounds  No murmur heard  Pulmonary:      Effort: Pulmonary effort is normal  No respiratory distress  Breath sounds: Normal breath sounds  No stridor  No wheezing or rales  Chest:      Chest wall: No tenderness  Abdominal:      General: Bowel sounds are normal  There is no distension  Palpations: Abdomen is soft  There is no mass  Tenderness: There is no abdominal tenderness  There is no right CVA tenderness, left CVA tenderness or guarding  Musculoskeletal:         General: No tenderness  Right lower leg: No edema  Left lower leg: No edema  Lymphadenopathy:      Cervical: No cervical adenopathy  Skin:     General: Skin is warm  Neurological:      Mental Status: She is alert and oriented to person, place, and time  Cranial Nerves: No cranial nerve deficit        Coordination: Coordination normal    Psychiatric:         Behavior: Behavior normal           Davina Lizarraga MD  [address] [facility]

## 2023-05-10 ENCOUNTER — HOSPITAL ENCOUNTER (OUTPATIENT)
Dept: RADIOLOGY | Facility: HOSPITAL | Age: 48
Discharge: HOME | End: 2023-05-10

## 2023-05-10 DIAGNOSIS — R07.9 CHEST PAIN, UNSPECIFIED TYPE: ICD-10-CM

## 2023-05-10 DIAGNOSIS — R63.5 WEIGHT GAIN: ICD-10-CM

## 2023-05-22 ENCOUNTER — HOSPITAL ENCOUNTER (OUTPATIENT)
Dept: NON INVASIVE DIAGNOSTICS | Facility: HOSPITAL | Age: 48
Discharge: HOME | End: 2023-05-22

## 2023-05-22 VITALS — BODY MASS INDEX: 24.59 KG/M2 | WEIGHT: 153 LBS | HEIGHT: 66 IN

## 2023-05-22 DIAGNOSIS — R07.9 CHEST PAIN, UNSPECIFIED TYPE: ICD-10-CM

## 2023-05-22 DIAGNOSIS — E78.5 HYPERLIPIDEMIA, UNSPECIFIED HYPERLIPIDEMIA TYPE: ICD-10-CM

## 2023-05-22 LAB
E WAVE DECELERATION TIME: 229 MS
MAX HR PERCENT: 94 %
MAX HR: 162 BPM
MV PEAK A VEL: 0.56 M/S
MV PEAK E VEL: 74 CM/S
MV STENOSIS PRESSURE HALF TIME: 66 MS
MV VALVE AREA P 1/2 METHOD: 3.33 CM2
RATE PRESSURE PRODUCT: NORMAL
SL CV LV EF: 60
SL CV STRESS RECOVERY BP: NORMAL MMHG
SL CV STRESS RECOVERY HR: 93 BPM
SL CV STRESS RECOVERY O2 SAT: 98 %
SL CV STRESS STAGE REACHED: 4
STRESS ANGINA INDEX: 0
STRESS BASELINE BP: NORMAL MMHG
STRESS BASELINE HR: 77 BPM
STRESS DUKE TREADMILL SCORE: 5
STRESS O2 SAT REST: 99 %
STRESS PEAK HR: 162 BPM
STRESS POST ESTIMATED WORKLOAD: 11.7 METS
STRESS POST EXERCISE DUR MIN: 10 MIN
STRESS POST O2 SAT PEAK: 97 %
STRESS POST PEAK BP: 160 MMHG
STRESS ST ELEVATION: 1 MM
TR MAX PG: 20 MMHG
TR PEAK VELOCITY: 2.2 M/S
TRICUSPID VALVE PEAK REGURGITATION VELOCITY: 2.23 M/S

## 2023-05-24 LAB
CHEST PAIN STATEMENT: NORMAL
MAX DIASTOLIC BP: 60 MMHG
MAX HEART RATE: 162 BPM
MAX PREDICTED HEART RATE: 171 BPM
MAX. SYSTOLIC BP: 160 MMHG
PROTOCOL NAME: NORMAL
TARGET HR FORMULA: NORMAL
TEST INDICATION: NORMAL
TIME IN EXERCISE PHASE: NORMAL

## 2023-07-17 ENCOUNTER — OFFICE VISIT (OUTPATIENT)
Dept: URGENT CARE | Facility: CLINIC | Age: 48
End: 2023-07-17
Payer: COMMERCIAL

## 2023-07-17 VITALS
HEIGHT: 66 IN | TEMPERATURE: 96.6 F | OXYGEN SATURATION: 98 % | HEART RATE: 74 BPM | DIASTOLIC BLOOD PRESSURE: 70 MMHG | BODY MASS INDEX: 24.59 KG/M2 | WEIGHT: 153 LBS | RESPIRATION RATE: 16 BRPM | SYSTOLIC BLOOD PRESSURE: 112 MMHG

## 2023-07-17 DIAGNOSIS — L24.7 IRRITANT CONTACT DERMATITIS DUE TO PLANTS, EXCEPT FOOD: Primary | ICD-10-CM

## 2023-07-17 PROCEDURE — 99213 OFFICE O/P EST LOW 20 MIN: CPT | Performed by: NURSE PRACTITIONER

## 2023-07-17 RX ORDER — TRIAMCINOLONE ACETONIDE 5 MG/G
CREAM TOPICAL 2 TIMES DAILY
Qty: 30 G | Refills: 1 | Status: SHIPPED | OUTPATIENT
Start: 2023-07-17

## 2023-07-17 NOTE — PATIENT INSTRUCTIONS
Contact Dermatitis   AMBULATORY CARE:   Contact dermatitis  is a rash. It develops when you touch something that irritates your skin or causes an allergic reaction. Common signs and symptoms include the following:   Red, swollen, painful rash    Skin that itches, stings, or burns    Dry, scaly, or crusty skin patches    Bumps or blisters    Fluid draining from blisters    Call your local emergency number (911 in the [address]) if:   You have sudden trouble breathing. Your throat swells and you have trouble eating. Your face is swollen. Call your doctor or dermatologist if:   You have a fever. Your blisters are draining pus. Your rash spreads or does not get better, even after treatment. You have questions or concerns about your condition or care. Treatment for contact dermatitis  involves removing any irritants or allergens that cause your rash. You may also need medicines to decrease itching and swelling. They will be given as a topical medicine to apply to your rash or as a pill. Manage contact dermatitis:   Take short baths or showers in cool water. Use mild soap or soap-free cleansers. Add oatmeal, baking soda, or cornstarch to the bath water to help decrease skin irritation. Avoid skin irritants. Examples include makeup, hair products, soaps, and cleansers. Use products that do not contain a scent or dye. Apply a cool compress to your rash. This will help soothe your skin. Apply lotions or creams to the area. These help keep your skin moist and decrease itching. Apply the lotion or cream right after a lukewarm bath or shower when your skin is still damp. Use products that do not contain a scent. Follow up with your doctor or dermatologist in 2 to 3 days:  Write down your questions so you remember to ask them during your visits. © Copyright [facility] 2022 Information is for End User's use only and may not be sold, redistributed or otherwise used for commercial purposes.   The above information is an  only. It is not intended as medical advice for individual conditions or treatments. Talk to your doctor, nurse or pharmacist before following any medical regimen to see if it is safe and effective for you.

## 2023-07-17 NOTE — PROGRESS NOTES
[facility]        NAME: Zaria Li is a 46 y.o. female  : 1975    MRN: [id number]  DATE: 2023  TIME: 5:04 PM    Assessment and Plan   Irritant contact dermatitis due to plants, except food [L24.7]  1. Irritant contact dermatitis due to plants, except food  triamcinolone (KENALOG) 0.5 % cream        Triamcinolone cream topically sent to the pharmacy. Recommend taking OTC antihistamine such as Claritin, Zyrtec, Allegra. Patient Instructions     Follow up with PCP in 3-5 days. Proceed to  ER if symptoms worsen. Chief Complaint     Chief Complaint   Patient presents with   • Rash     Rash for 1 week. Thinks it is poison         History of Present Illness   Zaria Li presents to the clinic c/o    Patient presents to the office with complaints of a rash on her arms, torso, back and neck for about the past week. Every few days she notices some new areas. Have been doing yard work prior to and thinks it is a poison but is not sure. Has been applying a topical Benadryl cream with minimal relief      Review of Systems   Review of Systems   All other systems reviewed and are negative.         Current Medications     Long-Term Medications   Medication Sig Dispense Refill   • ascorbic acid (VITAMIN C) 500 mg tablet Take 500 mg by mouth daily     • Calcium Carbonate 1500 (600 Ca) MG TABS Take by mouth     • Cholecalciferol (VITAMIN D3) 1000 units CAPS Take by mouth     • triamcinolone (KENALOG) 0.5 % cream Apply topically 2 (two) times a day 30 g 1   • clindamycin (CLEOCIN T) 1 % lotion APPLY A THIN LAYER ONCE DAILY TO AFFECTED AREA OF CHEST AS NEEDED FOR FLARE (Patient not taking: Reported on 2023)     • polyethylene glycol (GOLYTELY) 4000 mL solution Take 4,000 mL by mouth once for 1 dose Take as directed by office 4000 mL 0       Current Allergies     Allergies as of 2023   • (No Known Allergies)            The following portions of the patient's history were reviewed and updated as appropriate: allergies, current medications, past family history, past medical history, past social history, past surgical history and problem list.    Objective   /70   Pulse 74   Temp (!) 96.6 °F (35.9 °C) (Tympanic)   Resp 16   Ht 5' 6" (1.676 m)   Wt 69.4 kg (153 lb)   SpO2 98%   BMI 24.69 kg/m²        Physical Exam     Physical Exam  Vitals and nursing note reviewed. Constitutional:       Appearance: Normal appearance. She is well-developed. HENT:      Head: Normocephalic and atraumatic. Eyes:      General: Lids are normal.      Conjunctiva/sclera: Conjunctivae normal.   Cardiovascular:      Rate and Rhythm: Normal rate and regular rhythm. Heart sounds: Normal heart sounds, S1 normal and S2 normal.   Pulmonary:      Effort: Pulmonary effort is normal.      Breath sounds: Normal breath sounds. Skin:     General: Skin is warm and dry. Neurological:      Mental Status: She is alert and oriented to person, place, and time. Psychiatric:         Speech: Speech normal.         Behavior: Behavior normal. Behavior is cooperative. Thought Content:  Thought content normal.         Judgment: Judgment normal.

## 2023-07-20 ENCOUNTER — APPOINTMENT (RX ONLY)
Dept: URBAN - NONMETROPOLITAN AREA CLINIC 4 | Facility: CLINIC | Age: 48
Setting detail: DERMATOLOGY
End: 2023-07-20

## 2023-07-20 DIAGNOSIS — L23.7 ALLERGIC CONTACT DERMATITIS DUE TO PLANTS, EXCEPT FOOD: ICD-10-CM

## 2023-07-20 DIAGNOSIS — B07.8 OTHER VIRAL WARTS: ICD-10-CM

## 2023-07-20 DIAGNOSIS — Q826 OTHER SPECIFIED ANOMALIES OF SKIN: ICD-10-CM

## 2023-07-20 DIAGNOSIS — Q819 OTHER SPECIFIED ANOMALIES OF SKIN: ICD-10-CM

## 2023-07-20 DIAGNOSIS — Q828 OTHER SPECIFIED ANOMALIES OF SKIN: ICD-10-CM

## 2023-07-20 PROBLEM — L85.8 OTHER SPECIFIED EPIDERMAL THICKENING: Status: ACTIVE | Noted: 2023-07-20

## 2023-07-20 PROCEDURE — ? PRESCRIPTION MEDICATION MANAGEMENT

## 2023-07-20 PROCEDURE — ? TREATMENT REGIMEN

## 2023-07-20 PROCEDURE — ? PRESCRIPTION

## 2023-07-20 PROCEDURE — ? BENIGN DESTRUCTION

## 2023-07-20 PROCEDURE — 17110 DESTRUCTION B9 LES UP TO 14: CPT

## 2023-07-20 PROCEDURE — ? COUNSELING

## 2023-07-20 PROCEDURE — 99213 OFFICE O/P EST LOW 20 MIN: CPT | Mod: 25

## 2023-07-20 RX ORDER — PREDNISONE 10 MG/1
10 MG TABLET ORAL QDAY
Qty: 33 | Refills: 0 | Status: ERX | COMMUNITY
Start: 2023-07-20

## 2023-07-20 RX ADMIN — PREDNISONE 10 MG: 10 TABLET ORAL at 00:00

## 2023-07-20 ASSESSMENT — LOCATION DETAILED DESCRIPTION DERM
LOCATION DETAILED: LEFT INDEX FINGERTIP
LOCATION DETAILED: MID POSTERIOR NECK
LOCATION DETAILED: LEFT ANTERIOR DISTAL UPPER ARM
LOCATION DETAILED: RIGHT VENTRAL PROXIMAL FOREARM
LOCATION DETAILED: LEFT VENTRAL PROXIMAL FOREARM

## 2023-07-20 ASSESSMENT — LOCATION SIMPLE DESCRIPTION DERM
LOCATION SIMPLE: RIGHT FOREARM
LOCATION SIMPLE: LEFT FOREARM
LOCATION SIMPLE: LEFT UPPER ARM
LOCATION SIMPLE: POSTERIOR NECK
LOCATION SIMPLE: LEFT INDEX FINGER

## 2023-07-20 ASSESSMENT — LOCATION ZONE DERM
LOCATION ZONE: FINGER
LOCATION ZONE: ARM
LOCATION ZONE: NECK

## 2023-07-20 NOTE — PROCEDURE: TREATMENT REGIMEN
Initiate Treatment: Prednisone 10 mg take 40 mg x 3 days, 30 mg x 3 days, 20 mg x 4 days, then 10 mg x 4 days.
Detail Level: Zone

## 2023-07-20 NOTE — PROCEDURE: BENIGN DESTRUCTION
Consent: The patient's consent was obtained including but not limited to risks of crusting, scabbing, blistering, scarring, darker or lighter pigmentary change, recurrence, incomplete removal and infection.
Add 52 Modifier (Optional): no
Medical Necessity Information: It is in your best interest to select a reason for this procedure from the list below. All of these items fulfill various CMS LCD requirements except the new and changing color options.
Detail Level: Detailed
Anesthesia Volume In Cc: 0.5
Treatment Number (Will Not Render If 0): 0
Post-Care Instructions: I reviewed with the patient in detail post-care instructions. Patient is to wear sunprotection, and avoid picking at any of the treated lesions. Pt may apply Vaseline to crusted or scabbing areas.
Medical Necessity Clause: This procedure was medically necessary because the lesions that were treated were:

## 2023-09-15 ENCOUNTER — TELEPHONE (OUTPATIENT)
Dept: FAMILY MEDICINE CLINIC | Facility: CLINIC | Age: 48
End: 2023-09-15

## 2023-09-15 DIAGNOSIS — U07.1 COVID: Primary | ICD-10-CM

## 2023-09-15 RX ORDER — NIRMATRELVIR AND RITONAVIR 300-100 MG
3 KIT ORAL 2 TIMES DAILY
Qty: 30 TABLET | Refills: 0 | Status: SHIPPED | OUTPATIENT
Start: 2023-09-15 | End: 2023-09-20

## 2023-09-15 NOTE — TELEPHONE ENCOUNTER
Patient tested positive for COVID this morning, she would like to know what she should do since this is the first time she had it. I was unable to put her on for a virtual due to no availability.

## 2023-09-18 ENCOUNTER — NURSE TRIAGE (OUTPATIENT)
Dept: OTHER | Facility: OTHER | Age: 48
End: 2023-09-18

## 2023-09-18 ENCOUNTER — TELEPHONE (OUTPATIENT)
Dept: FAMILY MEDICINE CLINIC | Facility: CLINIC | Age: 48
End: 2023-09-18

## 2023-09-18 NOTE — TELEPHONE ENCOUNTER
Patient tested positive on Friday for Covid. Patient now believes she has strep throat. She's had a sore throat all weekend and now has white spots on her throat.

## 2023-09-18 NOTE — TELEPHONE ENCOUNTER
Regarding: Covid postive - sore throat, white spots in back of throat  ----- Message from Juan Daniel Davis sent at 9/18/2023  7:09 PM EDT -----  " I tested positive for Covid on Friday. My sore throat is getting worse and the pain is unbearable, and now I have white spots in the back to my throat.  I was wondering if I can get something to provide me with some relief."

## 2023-09-18 NOTE — TELEPHONE ENCOUNTER
Reason for Disposition  • SEVERE (e.g., excruciating) throat pain    Answer Assessment - Initial Assessment Questions  1. ONSET: "When did the throat start hurting?" (Hours or days ago)       Ongoing since Friday  2. SEVERITY: "How bad is the sore throat?" (Scale 1-10; mild, moderate or severe)    - MILD (1-3):  doesn't interfere with eating or normal activities    - MODERATE (4-7): interferes with eating some solids and normal activities    - SEVERE (8-10):  excruciating pain, interferes with most normal activities    - SEVERE DYSPHAGIA: can't swallow liquids, drooling      severe  3. STREP EXPOSURE: "Has there been any exposure to strep within the past week?" If Yes, ask: "What type of contact occurred?"       unknown  4. VIRAL SYMPTOMS: "Are there any symptoms of a cold, such as a runny nose, cough, hoarse voice or red eyes?"       Fever and bodyaches Friday which have since resolved. 5. FEVER: "Do you have a fever?" If Yes, ask: "What is your temperature, how was it measured, and when did it start?"      denies  6. PUS ON THE TONSILS: "Is there pus on the tonsils in the back of your throat?"      White spots noted on tonsils  7.  OTHER SYMPTOMS: "Do you have any other symptoms?" (e.g., difficulty breathing, headache, rash)      denies    Protocols used: SORE THROAT-ADULT-

## 2023-09-18 NOTE — TELEPHONE ENCOUNTER
Patient tested positive for covid on Friday and prescribed paxlovid. Body aches and fever have resolved. She notes however that her throat pain has become more severe and she now has white spots on her tonsils. Would like a face to face visit to be tested for strep. Please advise.

## 2023-09-19 DIAGNOSIS — J02.0 STREP THROAT: Primary | ICD-10-CM

## 2023-09-19 RX ORDER — AMOXICILLIN 500 MG/1
500 CAPSULE ORAL EVERY 8 HOURS SCHEDULED
Qty: 30 CAPSULE | Refills: 0 | Status: SHIPPED | OUTPATIENT
Start: 2023-09-19 | End: 2023-09-29

## 2023-09-20 ENCOUNTER — TELEPHONE (OUTPATIENT)
Dept: FAMILY MEDICINE CLINIC | Facility: CLINIC | Age: 48
End: 2023-09-20

## 2023-09-20 NOTE — TELEPHONE ENCOUNTER
Patient called and wants to know when its safe to start being around other people due to having covid and possible strep throat

## 2023-12-02 DIAGNOSIS — Z00.6 ENCOUNTER FOR EXAMINATION FOR NORMAL COMPARISON OR CONTROL IN CLINICAL RESEARCH PROGRAM: ICD-10-CM

## 2023-12-18 ENCOUNTER — HOSPITAL ENCOUNTER (OUTPATIENT)
Dept: MAMMOGRAPHY | Facility: MEDICAL CENTER | Age: 48
Discharge: HOME | End: 2023-12-18
Payer: COMMERCIAL

## 2023-12-18 VITALS — BODY MASS INDEX: 24.59 KG/M2 | WEIGHT: 153 LBS | HEIGHT: 66 IN

## 2023-12-18 DIAGNOSIS — Z12.31 ENCOUNTER FOR SCREENING MAMMOGRAM FOR MALIGNANT NEOPLASM OF BREAST: ICD-10-CM

## 2023-12-18 PROCEDURE — 77063 BREAST TOMOSYNTHESIS BI: CPT

## 2023-12-18 PROCEDURE — 77067 SCR MAMMO BI INCL CAD: CPT

## 2024-02-22 ENCOUNTER — TELEMEDICINE (OUTPATIENT)
Dept: OBGYN CLINIC | Facility: CLINIC | Age: 49
End: 2024-02-22
Payer: COMMERCIAL

## 2024-02-22 DIAGNOSIS — N95.1 MENOPAUSAL VASOMOTOR SYNDROME: Primary | ICD-10-CM

## 2024-02-22 DIAGNOSIS — R63.5 WEIGHT GAIN: ICD-10-CM

## 2024-02-22 PROCEDURE — 99215 OFFICE O/P EST HI 40 MIN: CPT | Performed by: OBSTETRICS & GYNECOLOGY

## 2024-02-22 RX ORDER — ESTRADIOL 1 MG/1
1 TABLET ORAL DAILY
Qty: 30 TABLET | Refills: 11 | Status: SHIPPED | OUTPATIENT
Start: 2024-02-22

## 2024-02-22 RX ORDER — PROGESTERONE 100 MG/1
100 CAPSULE ORAL
Qty: 30 CAPSULE | Refills: 11 | Status: SHIPPED | OUTPATIENT
Start: 2024-02-22

## 2024-02-22 NOTE — PROGRESS NOTES
Virtual Regular Visit    Verification of patient location:    Patient is located at Other in the following state in which I hold an active license [place]      Assessment/Plan:    Problem List Items Addressed This Visit    None  Visit Diagnoses       Menopausal vasomotor syndrome    -  Primary    Relevant Medications    estradiol (Estrace) 1 mg tablet    Progesterone 100 MG CAPS    Weight gain              1) Her symptoms are classic for ovarian hormonal decline.  2) Controversies surrounding estrogen based HRT discussed, including the fear based off the [facility] trials concerning Prempro. I will not be prescribing Prempro. One should not assume all therapies confer the same risk or benefit. Stay tuned to the REPLENISH trials for E2/PG therapy in the [place] with Bijuva, but European trials with same medication point to safety and efficacy with superior health outcomes with women on this type of HRT.  3) I discussed the long term health benefits of E2/PG, including: reduction of CVD risk, reduction of hyperlipidemia, reduction of DM and GLP-1 agonist activity with mild appetite suppression; reduction of colon CA, osteoporosis and cognitive decline, Alzheimer's disease.  4) She would desperately like to try HRT! Oral estradiol 1 mg/ progesterone 100 mg nightly issued. Side effects of HRT reviewed including vaginal bleeding, breast tenderness and somnolence, easily remedied with dose adjustment.   5) Email me with progress report in one month. Follow up pr  6) We did discuss Veozah as a possibility, will require a prior auth. Not my preference for first starts.   7) I also reminded her that MHT are not weight loss drugs, but WILL help reward her efforts for diet and exercise.  8) Follow up one year or prn.    This was a 50 minute visit with greater than 50% of time spent in face to face counseling and coordination of care.         Chief Complaint   Patient presents with    Virtual Regular Visit          Encounter provider  Deepthi Bai MD    Provider located at OB[facility] [address] [address] [phone]      Recent Visits  No visits were found meeting these conditions.  Showing recent visits within past 7 days and meeting all other requirements  Today's Visits  Date Type Provider Dept   02/22/24 Telemedicine Deepthi Bai MD [facility]   Showing today's visits and meeting all other requirements  Future Appointments  No visits were found meeting these conditions.  Showing future appointments within next 150 days and meeting all other requirements       The patient was identified by name and date of birth. Julee Knight was informed that this is a telemedicine visit and that the visit is being conducted through the Epic Embedded platform. She agrees to proceed..  My office door was closed. No one else was in the room.  She acknowledged consent and understanding of privacy and security of the video platform. The patient has agreed to participate and understands they can discontinue the visit at any time.    Patient is aware this is a billable service.     Subjective        Julee Nelson presents for menopausal consult, her first visit with me; self referred, sees Dr. Slater for gyn care  Julee Nelson has been menopausal for one year now, has struggled significantly with:  1) Hot flashes and night sweats, affecting sleep quality. These are severe  2) Progressive weight gain. Just concerned that she will continue to gain with menopause, referred to Weight management to discuss, which she does not feel like she needs to do at this time.   NO hormone discussion has occurred.   PMH: neg  SHX: denies tobacco, ETOH  FHX: Mom healthy; MGM dementia, MGF unknown. M. Aunt cardiac stents. Dad alive, prostate CA. PGM pancreatic Ca. 1 brother, prostate Ca, NO childrena       Past Medical History:   Diagnosis  Date    Back pain     Varicella        No past surgical history on file.    Current Outpatient Medications   Medication Sig Dispense Refill    estradiol (Estrace) 1 mg tablet Take 1 tablet (1 mg total) by mouth daily 30 tablet 11    Progesterone 100 MG CAPS Take 100 mg by mouth at bedtime 30 capsule 11    ascorbic acid (VITAMIN C) 500 mg tablet Take 500 mg by mouth daily      Calcium Carbonate 1500 (600 Ca) MG TABS Take by mouth      Cholecalciferol (VITAMIN D3) 1000 units CAPS Take by mouth       No current facility-administered medications for this visit.        No Known Allergies    Review of Systems   Constitutional:  Positive for unexpected weight change. Negative for activity change and appetite change.   Gastrointestinal: Negative.    Endocrine: Positive for heat intolerance.   Genitourinary: Negative.    Musculoskeletal: Negative.    Psychiatric/Behavioral:  Positive for dysphoric mood and sleep disturbance.      Video Exam    There were no vitals filed for this visit.    Physical Exam

## 2024-03-01 ENCOUNTER — APPOINTMENT (OUTPATIENT)
Dept: LAB | Facility: CLINIC | Age: 49
End: 2024-03-01
Payer: COMMERCIAL

## 2024-03-01 DIAGNOSIS — Z11.59 NEED FOR HEPATITIS C SCREENING TEST: ICD-10-CM

## 2024-03-01 DIAGNOSIS — Z11.4 SCREENING FOR HIV (HUMAN IMMUNODEFICIENCY VIRUS): ICD-10-CM

## 2024-03-01 DIAGNOSIS — Z00.6 ENCOUNTER FOR EXAMINATION FOR NORMAL COMPARISON OR CONTROL IN CLINICAL RESEARCH PROGRAM: ICD-10-CM

## 2024-03-01 LAB
ALBUMIN SERPL BCP-MCNC: 4.4 G/DL (ref 3.5–5)
ALP SERPL-CCNC: 56 U/L (ref 34–104)
ALT SERPL W P-5'-P-CCNC: 12 U/L (ref 7–52)
ANION GAP SERPL CALCULATED.3IONS-SCNC: 9 MMOL/L
AST SERPL W P-5'-P-CCNC: 19 U/L (ref 13–39)
BILIRUB SERPL-MCNC: 0.67 MG/DL (ref 0.2–1)
BUN SERPL-MCNC: 19 MG/DL (ref 5–25)
CALCIUM SERPL-MCNC: 9.2 MG/DL (ref 8.4–10.2)
CHLORIDE SERPL-SCNC: 106 MMOL/L (ref 96–108)
CHOLEST SERPL-MCNC: 224 MG/DL
CO2 SERPL-SCNC: 27 MMOL/L (ref 21–32)
CREAT SERPL-MCNC: 0.82 MG/DL (ref 0.6–1.3)
GFR SERPL CREATININE-BSD FRML MDRD: 83 ML/MIN/1.73SQ M
GLUCOSE P FAST SERPL-MCNC: 84 MG/DL (ref 65–99)
HCV AB SER QL: NORMAL
HDLC SERPL-MCNC: 60 MG/DL
HIV 1+2 AB+HIV1 P24 AG SERPL QL IA: NORMAL
HIV 2 AB SERPL QL IA: NORMAL
HIV1 AB SERPL QL IA: NORMAL
HIV1 P24 AG SERPL QL IA: NORMAL
LDLC SERPL CALC-MCNC: 134 MG/DL (ref 0–100)
NONHDLC SERPL-MCNC: 164 MG/DL
POTASSIUM SERPL-SCNC: 3.9 MMOL/L (ref 3.5–5.3)
PROT SERPL-MCNC: 7.3 G/DL (ref 6.4–8.4)
SODIUM SERPL-SCNC: 142 MMOL/L (ref 135–147)
TRIGL SERPL-MCNC: 151 MG/DL

## 2024-03-01 PROCEDURE — 36415 COLL VENOUS BLD VENIPUNCTURE: CPT

## 2024-03-01 PROCEDURE — 87389 HIV-1 AG W/HIV-1&-2 AB AG IA: CPT

## 2024-03-01 PROCEDURE — 86803 HEPATITIS C AB TEST: CPT

## 2024-03-06 ENCOUNTER — ANNUAL EXAM (OUTPATIENT)
Dept: OBGYN CLINIC | Facility: CLINIC | Age: 49
End: 2024-03-06
Payer: COMMERCIAL

## 2024-03-06 VITALS
DIASTOLIC BLOOD PRESSURE: 60 MMHG | BODY MASS INDEX: 25.46 KG/M2 | WEIGHT: 158.4 LBS | SYSTOLIC BLOOD PRESSURE: 100 MMHG | HEIGHT: 66 IN

## 2024-03-06 DIAGNOSIS — Z12.31 ENCOUNTER FOR SCREENING MAMMOGRAM FOR MALIGNANT NEOPLASM OF BREAST: ICD-10-CM

## 2024-03-06 DIAGNOSIS — Z12.4 PAP SMEAR FOR CERVICAL CANCER SCREENING: ICD-10-CM

## 2024-03-06 DIAGNOSIS — Z01.419 ENCOUNTER FOR WELL WOMAN EXAM WITH ROUTINE GYNECOLOGICAL EXAM: Primary | ICD-10-CM

## 2024-03-06 PROCEDURE — G0476 HPV COMBO ASSAY CA SCREEN: HCPCS | Performed by: OBSTETRICS & GYNECOLOGY

## 2024-03-06 PROCEDURE — G0145 SCR C/V CYTO,THINLAYER,RESCR: HCPCS | Performed by: OBSTETRICS & GYNECOLOGY

## 2024-03-06 PROCEDURE — S0612 ANNUAL GYNECOLOGICAL EXAMINA: HCPCS | Performed by: OBSTETRICS & GYNECOLOGY

## 2024-03-06 NOTE — PROGRESS NOTES
ASSESSMENT & PLAN: Julee Knight is a 48 y.o.  with normal gynecologic exam.    1.  Routine well woman exam done today  2.  Pap and HPV:  The patient's last pap was .    It was normal.  Pap with reflex cotesting was done today.    Current ASCCP Guidelines reviewed.   3.  Mammogram ordered  4. The following were reviewed in today's visit: breast self exam and mammography screening ordered  5. Contraception: none  6. Symptomatic menopause, on HRT       CC:  Annual Gynecologic Examination    HPI: Julee Knight is a 48 y.o.  who presents for annual gynecologic examination.  She has the following concerns: was started on HRT recently doing well with it. Started about 2 weeks, no side effects.       Health Maintenance:    She wears her seatbelt routinely.    She does perform regular monthly self breast exams.    She feels safe at home.     Patient Active Problem List   Diagnosis   (none) - all problems resolved or deleted       Past Medical History:   Diagnosis Date    Back pain     Varicella        History reviewed. No pertinent surgical history.    Past OB/Gyn History:  OB History          0    Para   0    Term   0       0    AB   0    Living   0         SAB   0    IAB   0    Ectopic   0    Multiple   0    Live Births   0                  Pt does not have menstrual issues.  .  History of sexually transmitted infection: No.  History of abnormal pap smears: No       Patient is not currently sexually active.  heterosexual. The current method of family planning is none.    Family History   Problem Relation Age of Onset    Heart disease Mother         AFib    Prostate cancer Father 70    Cancer Father         Prostate    Skin cancer Father     Prostate cancer Brother     No Known Problems Maternal Grandmother     No Known Problems Maternal Grandfather     Cancer Paternal Grandmother         Pancreatic Cancer    Colon cancer Paternal Grandfather     No Known Problems Maternal Aunt     No  Known Problems Paternal Aunt     BRCA2 Negative Neg Hx     BRCA2 Positive Neg Hx     BRCA1 Positive Neg Hx     BRCA1 Negative Neg Hx     BRCA 1/2 Neg Hx     Ovarian cancer Neg Hx     Endometrial cancer Neg Hx     Breast cancer additional onset Neg Hx     Breast cancer Neg Hx        Social History:  Social History     Socioeconomic History    Marital status: Single     Spouse name: Not on file    Number of children: Not on file    Years of education: Not on file    Highest education level: Not on file   Occupational History    Not on file   Tobacco Use    Smoking status: Never    Smokeless tobacco: Never   Vaping Use    Vaping status: Never Used   Substance and Sexual Activity    Alcohol use: Yes     Alcohol/week: 2.0 standard drinks of alcohol     Types: 2 Glasses of wine per week    Drug use: Never    Sexual activity: Not Currently     Partners: Male     Birth control/protection: Abstinence, Condom Male   Other Topics Concern    Not on file   Social History Narrative    Pt states she has coffee daily     Social Determinants of Health     Financial Resource Strain: Not on file   Food Insecurity: Not on file   Transportation Needs: Not on file   Physical Activity: Not on file   Stress: Not on file   Social Connections: Not on file   Intimate Partner Violence: Not on file   Housing Stability: Not on file     No Known Allergies    Current Outpatient Medications:     ascorbic acid (VITAMIN C) 500 mg tablet, Take 500 mg by mouth daily, Disp: , Rfl:     Calcium Carbonate 1500 (600 Ca) MG TABS, Take by mouth, Disp: , Rfl:     Cholecalciferol (VITAMIN D3) 1000 units CAPS, Take by mouth, Disp: , Rfl:     estradiol (Estrace) 1 mg tablet, Take 1 tablet (1 mg total) by mouth daily, Disp: 30 tablet, Rfl: 11    Progesterone 100 MG CAPS, Take 100 mg by mouth at bedtime, Disp: 30 capsule, Rfl: 11    Review of Systems:    Review of Systems  Constitutional :no fever, feels well, no tiredness, no recent weight gain or loss  ENT: no  "ear ache, no loss of hearing, no nosebleeds or nasal discharge, no sore throat or hoarseness.  Cardiovascular: no complaints of slow or fast heart beat, no chest pain, no palpitations, no leg claudication or lower extremity edema.  Respiratory: no complaints of shortness of shortness of breath, no HOWARD  Breasts:no complaints of breast pain, breast lump, or nipple discharge  Gastrointestinal: no complaints of abdominal pain, constipation, nausea, vomiting, or diarrhea or bloody stools  Genitourinary : no complaints of dysuria, incontinence, pelvic pain, no dysmenorrhea, vaginal discharge or abnormal vaginal bleeding and as noted in HPI.  Musculoskeletal: no complaints of arthralgia, no myalgia, no joint swelling or stiffness, no limb pain or swelling.  Integumentary: no complaints of skin rash or lesion, itching or dry skin  Neurological: no complaints of headache, no confusion, no numbness or tingling, no dizziness or fainting    Objective      /60   Ht 5' 6\" (1.676 m)   Wt 71.8 kg (158 lb 6.4 oz)   BMI 25.57 kg/m²     General:   appears stated age, cooperative, alert normal mood and affect   Neck: normal, supple,trachea midline, no masses   Heart: regular rate and rhythm, S1, S2 normal, no murmur, click, rub or gallop   Lungs: clear to auscultation bilaterally   Breasts: normal appearance, no masses or tenderness, Inspection negative, No nipple retraction or dimpling, No nipple discharge or bleeding, No axillary or supraclavicular adenopathy, Normal to palpation without dominant masses   Abdomen: soft, non-tender, without masses or organomegaly   Vulva: normal female genitalia, Bartholin's, Urethra, [place] normal, no lesions, normal female hair distribution   Vagina: normal vagina, no discharge, exudate, lesion, or erythema   Urethra: normal   Cervix: Normal, no discharge. PAP done.   Uterus: normal size, contour, position, consistency, mobility, non-tender   Adnexa: normal adnexa and no mass, fullness, " tenderness   Lymphatic palpation of lymph nodes in neck, axilla, groin and/or other locations: no lymphadenopathy or masses noted   Skin normal skin turgor and no rashes.   Psychiatric orientation to person, place, and time: normal. mood and affect: normal

## 2024-03-14 LAB
LAB AP GYN PRIMARY INTERPRETATION: NORMAL
Lab: NORMAL

## 2024-03-19 DIAGNOSIS — N95.1 MENOPAUSAL SYMPTOMS: Primary | ICD-10-CM

## 2024-03-19 RX ORDER — PROGESTERONE 200 MG/1
200 CAPSULE ORAL
Qty: 30 CAPSULE | Refills: 11 | Status: SHIPPED | OUTPATIENT
Start: 2024-03-19

## 2024-03-20 ENCOUNTER — PROCEDURE VISIT (OUTPATIENT)
Dept: DERMATOLOGY | Facility: CLINIC | Age: 49
End: 2024-03-20
Payer: COMMERCIAL

## 2024-03-20 VITALS — BODY MASS INDEX: 24.37 KG/M2 | WEIGHT: 151 LBS | TEMPERATURE: 97.1 F

## 2024-03-20 DIAGNOSIS — L60.3 NAIL DYSTROPHY: Primary | ICD-10-CM

## 2024-03-20 DIAGNOSIS — L82.1 SEBORRHEIC KERATOSIS: ICD-10-CM

## 2024-03-20 DIAGNOSIS — L91.8 ACROCHORDON: ICD-10-CM

## 2024-03-20 PROCEDURE — 99213 OFFICE O/P EST LOW 20 MIN: CPT | Performed by: DERMATOLOGY

## 2024-03-20 NOTE — PATIENT INSTRUCTIONS
"NAIL DYSTROPHY    Assessment and Plan:  Based on a thorough discussion of this condition and the management approach to it (including a comprehensive discussion of the known risks, side effects and potential benefits of treatment), the patient (family) agrees to implement the following specific plan:  Reassured benign  Try not to wear tight shoes  Can take over the counter supplement Biotin 2.5 mg daily    ACROCHORDON (\"SKIN TAG\")    Assessment and Plan:  Based on a thorough discussion of this condition and the management approach to it (including a comprehensive discussion of the known risks, side effects and potential benefits of treatment), the patient (family) agrees to implement the following specific plan:  Reassured benign    Skin tags are common, soft, harmless skin lesions that are also called, in the appropriate settings, papillomas, fibroepithelial polyps, and soft fibromas.  They are made up of loosely arranged collagen fibers and blood vessels surrounded by a thickened or thinned-out epidermis.    Skin tags tend to develop in both men and women as we grow older.  They are usually found on the skin folds (neck, armpits, groin).  It is not known what specifically causes skin tags.  Certain factors, though, do appear to play a role:  Chaffing and irritation from skin rubbing together  High levels of growth factors (as seen, for example, in pregnancy or in acromegaly/gigantism)  Insulin resistance  Human papillomavirus (wart virus)    We discussed that most skin tags do not need to be treated unless they are specifically causing the patient physical distress or limitation or pose a risk for a larger problem such as an infection that forms secondary to excoriation or chronic irritation.    We had a thorough discussion of treatment options and specific risks (including that any procedural treatment may not be covered by insurance and would then be the patient's responsibility) and benefits/alternatives " "including but not limited to the following:  Cryotherapy (freezing)  Shave removal  Surgical excision (snip excision with scissors)  Electrosurgery  Ligation (we do not do this procedure and counseled against it due to risk of tissue necrosis and infection)    SEBORRHEIC KERATOSIS; NON-INFLAMED    Assessment and Plan:  Based on a thorough discussion of this condition and the management approach to it (including a comprehensive discussion of the known risks, side effects and potential benefits of treatment), the patient (family) agrees to implement the following specific plan:  Reassured benign    Seborrheic Keratosis  A seborrheic keratosis is a harmless warty spot that appears during adult life as a common sign of skin aging.  Seborrheic keratoses can arise on any area of skin, covered or uncovered, with the usual exception of the palms and soles. They do not arise from mucous membranes. Seborrheic keratoses can have highly variable appearance.      Seborrheic keratoses are extremely common. It has been estimated that over 90% of adults over the age of 60 years have one or more of them. They occur in males and females of all races, typically beginning to erupt in the 30s or 40s. They are uncommon under the age of 20 years.  The precise cause of seborrhoeic keratoses is not known.  Seborrhoeic keratoses are considered degenerative in nature. As time goes by, seborrheic keratoses tend to become more numerous. Some people inherit a tendency to develop a very large number of them; some people may have hundreds of them.    The name \"seborrheic keratosis\" is misleading, because these lesions are not limited to a seborrhoeic distribution (scalp, mid-face, chest, upper back), nor are they formed from sebaceous glands, nor are they associated with sebum -- which is greasy.  Seborrheic keratosis may also be called \"SK,\" \"Seb K,\" \"basal cell papilloma,\" \"senile wart,\" or \"barnacle.\"      Researchers have noted:  Eruptive " "seborrhoeic keratoses can follow sunburn or dermatitis  Skin friction may be the reason they appear in body folds  Viral cause (e.g., human papillomavirus) seems unlikely  Stable and clonal mutations or activation of FRFR3, PIK3CA, LULU, AKT1 and EGFR genes are found in seborrhoeic keratoses  Seborrhoeic keratosis can arise from solar lentigo  FRFR3 mutations also arise in solar lentigines. These mutations are associated with increased age and location on the head and neck, suggesting a role of ultraviolet radiation in these lesions  Seborrheic keratoses do not harbour tumour suppressor gene mutations  Epidermal growth factor receptor inhibitors, which are used to treat some cancers, often result in an increase in verrucal (warty) keratoses.    There is no easy way to remove multiple lesions on a single occasion.  Unless a specific lesion is \"inflamed\" and is causing pain or stinging/burning or is bleeding, most insurance companies do not authorize treatment.      "

## 2024-03-20 NOTE — PROGRESS NOTES
"[facility] Dermatology Clinic Note     Patient Name: Julee Knight  Encounter Date: 3/20/24     Have you been cared for by a [facility] Dermatologist in the last 3 years and, if so, which description applies to you?    Yes.  I have been here within the last 3 years, and my medical history has NOT changed since that time.  I am FEMALE/of child-bearing potential.    REVIEW OF SYSTEMS:  Have you recently had or currently have any of the following? No changes in my recent health.   PAST MEDICAL HISTORY:  Have you personally ever had or currently have any of the following?  If \"YES,\" then please provide more detail. No changes in my medical history.   HISTORY OF IMMUNOSUPPRESSION: Do you have a history of any of the following:  Systemic Immunosuppression such as Diabetes, Biologic or Immunotherapy, Chemotherapy, Organ Transplantation, Bone Marrow Transplantation?  No     Answering \"YES\" requires the addition of the dotphrase \"IMMUNOSUPPRESSED\" as the first diagnosis of the patient's visit.   FAMILY HISTORY:  Any \"first degree relatives\" (parent, brother, sister, or child) with the following?    No changes in my family's known health.   PATIENT EXPERIENCE:    Do you want the Dermatologist to perform a COMPLETE skin exam today including a clinical examination under the \"bra and underwear\" areas?  NO  If necessary, do we have your permission to call and leave a detailed message on your Preferred Phone number that includes your specific medical information?  Yes      No Known Allergies   Current Outpatient Medications:   •  ascorbic acid (VITAMIN C) 500 mg tablet, Take 500 mg by mouth daily, Disp: , Rfl:   •  Calcium Carbonate 1500 (600 Ca) MG TABS, Take by mouth, Disp: , Rfl:   •  Cholecalciferol (VITAMIN D3) 1000 units CAPS, Take by mouth, Disp: , Rfl:   •  estradiol (Estrace) 1 mg tablet, Take 1 tablet (1 mg total) by mouth daily, Disp: 30 tablet, Rfl: 11  •  Progesterone 200 MG CAPS, Take 200 mg by mouth at bedtime, " "Disp: 30 capsule, Rfl: 11          Whom besides the patient is providing clinical information about today's encounter?   NO ADDITIONAL HISTORIAN (patient alone provided history)    Physical Exam and Assessment/Plan by Diagnosis:    NAIL DYSTROPHY  Physical Exam:  Anatomic Location Affected:  left pinkie toe, right thumb nail Morphological Description:  light brown discoloration of nail  Pertinent Positives: toenail shows minimal discoloration .   The thumbnail shows a liniear red streak.  Pertinent Negatives:    Additional History of Present Condition:  patient notes that noticed about 3 weeks ago; doesn't bother patient; - Conklin's sign, no dystrophy; right thumb nail mild fissuring with dystrophy on tip -will observe                  Assessment and Plan:  Based on a thorough discussion of this condition and the management approach to it (including a comprehensive discussion of the known risks, side effects and potential benefits of treatment), the patient (family) agrees to implement the following specific plan:  Reassured benign  Try not to wear tight shoes  Can take over the counter supplement Biotin 2.5 mg daily  I will check 3 month. The toenail appears to be post trauma.  Finger mail may be angiod streak will consider biopsy if and only progression.    ACROCHORDON (\"SKIN TAG\")    Physical Exam:  Anatomic Location Affected:  right cheek  Morphological Description:  skin colored tag  Pertinent Positives:  Pertinent Negatives:    Assessment and Plan:  Based on a thorough discussion of this condition and the management approach to it (including a comprehensive discussion of the known risks, side effects and potential benefits of treatment), the patient (family) agrees to implement the following specific plan:  Reassured benign    Skin tags are common, soft, harmless skin lesions that are also called, in the appropriate settings, papillomas, fibroepithelial polyps, and soft fibromas.  They are made up of " "loosely arranged collagen fibers and blood vessels surrounded by a thickened or thinned-out epidermis.    Skin tags tend to develop in both men and women as we grow older.  They are usually found on the skin folds (neck, armpits, groin).  It is not known what specifically causes skin tags.  Certain factors, though, do appear to play a role:  Chaffing and irritation from skin rubbing together  High levels of growth factors (as seen, for example, in pregnancy or in acromegaly/gigantism)  Insulin resistance  Human papillomavirus (wart virus)    We discussed that most skin tags do not need to be treated unless they are specifically causing the patient physical distress or limitation or pose a risk for a larger problem such as an infection that forms secondary to excoriation or chronic irritation.    We had a thorough discussion of treatment options and specific risks (including that any procedural treatment may not be covered by insurance and would then be the patient's responsibility) and benefits/alternatives including but not limited to the following:  Cryotherapy (freezing)  Shave removal  Surgical excision (snip excision with scissors)  Electrosurgery  Ligation (we do not do this procedure and counseled against it due to risk of tissue necrosis and infection)    SEBORRHEIC KERATOSIS; NON-INFLAMED    Physical Exam:  Anatomic Location Affected:  left lower abdomen  Morphological Description:  0.4. cm Flat and raised, waxy, smooth to warty textured, yellow to brownish-grey to dark brown to blackish, discrete, \"stuck-on\" appearing papules.  Pertinent Positives:  Pertinent Negatives:    Additional History of Present Condition:  Patient reports new bumps on the skin.  Denies itch, burn, pain, bleeding or ulceration.  Present constantly; nothing seems to make it worse or better.  No prior treatment.      Assessment and Plan:  Based on a thorough discussion of this condition and the management approach to it (including a " "comprehensive discussion of the known risks, side effects and potential benefits of treatment), the patient (family) agrees to implement the following specific plan:  Reassured benign    Seborrheic Keratosis  A seborrheic keratosis is a harmless warty spot that appears during adult life as a common sign of skin aging.  Seborrheic keratoses can arise on any area of skin, covered or uncovered, with the usual exception of the palms and soles. They do not arise from mucous membranes. Seborrheic keratoses can have highly variable appearance.      Seborrheic keratoses are extremely common. It has been estimated that over 90% of adults over the age of 60 years have one or more of them. They occur in males and females of all races, typically beginning to erupt in the 30s or 40s. They are uncommon under the age of 20 years.  The precise cause of seborrhoeic keratoses is not known.  Seborrhoeic keratoses are considered degenerative in nature. As time goes by, seborrheic keratoses tend to become more numerous. Some people inherit a tendency to develop a very large number of them; some people may have hundreds of them.    The name \"seborrheic keratosis\" is misleading, because these lesions are not limited to a seborrhoeic distribution (scalp, mid-face, chest, upper back), nor are they formed from sebaceous glands, nor are they associated with sebum -- which is greasy.  Seborrheic keratosis may also be called \"SK,\" \"Seb K,\" \"basal cell papilloma,\" \"senile wart,\" or \"barnacle.\"      Researchers have noted:  Eruptive seborrhoeic keratoses can follow sunburn or dermatitis  Skin friction may be the reason they appear in body folds  Viral cause (e.g., human papillomavirus) seems unlikely  Stable and clonal mutations or activation of FRFR3, PIK3CA, LULU, AKT1 and EGFR genes are found in seborrhoeic keratoses  Seborrhoeic keratosis can arise from solar lentigo  FRFR3 mutations also arise in solar lentigines. These mutations are " "associated with increased age and location on the head and neck, suggesting a role of ultraviolet radiation in these lesions  Seborrheic keratoses do not harbour tumour suppressor gene mutations  Epidermal growth factor receptor inhibitors, which are used to treat some cancers, often result in an increase in verrucal (warty) keratoses.    There is no easy way to remove multiple lesions on a single occasion.  Unless a specific lesion is \"inflamed\" and is causing pain or stinging/burning or is bleeding, most insurance companies do not authorize treatment.      Scribe Attestation    I,:  Sharon Villalpando am acting as a scribe while in the presence of the attending physician.:       I,:  James Vargas MD personally performed the services described in this documentation    as scribed in my presence.:           "

## 2024-03-24 LAB
APOB+LDLR+PCSK9 GENE MUT ANL BLD/T: NOT DETECTED
BRCA1+BRCA2 DEL+DUP + FULL MUT ANL BLD/T: NOT DETECTED
MLH1+MSH2+MSH6+PMS2 GN DEL+DUP+FUL M: NOT DETECTED

## 2024-05-06 ENCOUNTER — OFFICE VISIT (OUTPATIENT)
Dept: FAMILY MEDICINE CLINIC | Facility: CLINIC | Age: 49
End: 2024-05-06
Payer: COMMERCIAL

## 2024-05-06 VITALS
HEART RATE: 67 BPM | HEIGHT: 66 IN | SYSTOLIC BLOOD PRESSURE: 124 MMHG | WEIGHT: 155 LBS | RESPIRATION RATE: 16 BRPM | TEMPERATURE: 97 F | OXYGEN SATURATION: 98 % | DIASTOLIC BLOOD PRESSURE: 82 MMHG | BODY MASS INDEX: 24.91 KG/M2

## 2024-05-06 DIAGNOSIS — N64.4 BREAST PAIN, RIGHT: ICD-10-CM

## 2024-05-06 DIAGNOSIS — Z00.00 ANNUAL PHYSICAL EXAM: Primary | ICD-10-CM

## 2024-05-06 DIAGNOSIS — Z13.1 SCREENING FOR DIABETES MELLITUS: ICD-10-CM

## 2024-05-06 DIAGNOSIS — Z13.29 SCREENING FOR THYROID DISORDER: ICD-10-CM

## 2024-05-06 PROCEDURE — 99396 PREV VISIT EST AGE 40-64: CPT | Performed by: INTERNAL MEDICINE

## 2024-05-06 NOTE — PROGRESS NOTES
ADULT ANNUAL PHYSICAL  [facility] - [address] [facility]IMARY CARE [facility]    NAME: Julee Knight  AGE: 48 y.o. SEX: female  : 1975     DATE: 2024     Assessment and Plan:     Problem List Items Addressed This Visit    None  Visit Diagnoses       Annual physical exam    -  Primary    Breast pain, right        Relevant Orders    Mammo diagnostic right w cad    Screening for diabetes mellitus        Relevant Orders    CBC and differential    Hemoglobin A1C    Screening for thyroid disorder        Relevant Orders    TSH, 3rd generation with Free T4 reflex          Patient is recommended shingles vaccination however would wait till her diagnostic mammogram is resulted.  I would also recommend ABUS in  6 months from her last mammogram as breasts were dense.        Immunizations and preventive care screenings were discussed with patient today. Appropriate education was printed on patient's after visit summary.    Counseling:  Is here for annual physical.  She is up-to-date with mammogram however, began to have discomfort in the right breast about a week ago.  She reports no trauma the pain is persistent.  She is on hormone replacement for vasomotor symptoms.  Diagnostic mammogram will be ordered.  To note, her helix test was unremarkable.  She is up-to-date with colonoscopy.  Lab studies in March showed improvement of her dyslipidemia.  We will continue with diet modification.  She is due for hemoglobin A1c due to employer lab requirement, CBC and thyroid check which would be ordered today.  Not remarkable healing scan was unremarkable.  She is followed regular with her dermatologist.         No follow-ups on file.     Chief Complaint:     Chief Complaint   Patient presents with    Physical Exam     Patients states she been having right breast pain, it had started a week ago but it feels a little tender now.      History of Present Illness:     Adult Annual  Physical   Patient here for a comprehensive physical exam. The patient reports problems - see below .    Diet and Physical Activity  Diet/Nutrition: well balanced diet.   Exercise: walking.      Depression Screening  PHQ-2/9 Depression Screening    Little interest or pleasure in doing things: 0 - not at all  Feeling down, depressed, or hopeless: 0 - not at all  PHQ-2 Score: 0  PHQ-2 Interpretation: Negative depression screen       General Health  Sleep:  Having troubles with sleep .   Hearing: normal - bilateral.  Vision: no vision problems.   Dental: brushes teeth twice daily.       /GYN Health  Follows with gynecology? yes   Patient is: Menopause see above  Last menstrual period: See above  Contraceptive method:  See above .    Advanced Care Planning  Do you have an advanced directive? no  Do you have a durable medical power of ? yes  ACP document given to the patient? no     Review of Systems:     Review of Systems   Past Medical History:     Past Medical History:   Diagnosis Date    Back pain     Varicella       Past Surgical History:     History reviewed. No pertinent surgical history.   Social History:     Social History     Socioeconomic History    Marital status: Single     Spouse name: None    Number of children: None    Years of education: None    Highest education level: None   Occupational History    None   Tobacco Use    Smoking status: Never    Smokeless tobacco: Never   Vaping Use    Vaping status: Never Used   Substance and Sexual Activity    Alcohol use: Yes     Alcohol/week: 2.0 standard drinks of alcohol     Types: 2 Glasses of wine per week    Drug use: Never    Sexual activity: Not Currently     Partners: Male     Birth control/protection: Abstinence, Condom Male   Other Topics Concern    None   Social History Narrative    Pt states she has coffee daily     Social Determinants of Health     Financial Resource Strain: Not on file   Food Insecurity: Not on file   Transportation Needs:  "Not on file   Physical Activity: Not on file   Stress: Not on file   Social Connections: Not on file   Intimate Partner Violence: Not on file   Housing Stability: Not on file      Family History:     Family History   Problem Relation Age of Onset    Heart disease Mother         AFib    Prostate cancer Father 70    Cancer Father         Prostate    Skin cancer Father     Prostate cancer Brother     No Known Problems Maternal Aunt     Melanoma Paternal Aunt     No Known Problems Maternal Grandmother     No Known Problems Maternal Grandfather     Cancer Paternal Grandmother         Pancreatic Cancer    Colon cancer Paternal Grandfather     BRCA2 Negative Neg Hx     BRCA2 Positive Neg Hx     BRCA1 Positive Neg Hx     BRCA1 Negative Neg Hx     BRCA 1/2 Neg Hx     Ovarian cancer Neg Hx     Endometrial cancer Neg Hx     Breast cancer additional onset Neg Hx     Breast cancer Neg Hx       Current Medications:     Current Outpatient Medications   Medication Sig Dispense Refill    ascorbic acid (VITAMIN C) 500 mg tablet Take 500 mg by mouth daily      Calcium Carbonate 1500 (600 Ca) MG TABS Take by mouth      Cholecalciferol (VITAMIN D3) 1000 units CAPS Take by mouth      estradiol (Estrace) 1 mg tablet Take 1 tablet (1 mg total) by mouth daily 30 tablet 11    Progesterone 200 MG CAPS Take 200 mg by mouth at bedtime 30 capsule 11     No current facility-administered medications for this visit.      Allergies:     No Known Allergies   Physical Exam:     /82 (BP Location: Left arm, Patient Position: Sitting, Cuff Size: Large)   Pulse 67   Temp (!) 97 °F (36.1 °C) (Tympanic)   Resp 16   Ht 5' 6\" (1.676 m)   Wt 70.3 kg (155 lb)   SpO2 98%   BMI 25.02 kg/m²     Physical Exam  Constitutional:       General: She is not in acute distress.     Appearance: She is not diaphoretic.   Eyes:      General: No scleral icterus.     Pupils: Pupils are equal, round, and reactive to light.   Neck:      Thyroid: No thyromegaly.      " Vascular: No carotid bruit.   Cardiovascular:      Rate and Rhythm: Normal rate and regular rhythm.      Heart sounds: Normal heart sounds. No murmur heard.  Pulmonary:      Effort: Pulmonary effort is normal. No respiratory distress.      Breath sounds: Normal breath sounds. No stridor. No wheezing or rales.   Abdominal:      General: Bowel sounds are normal. There is no distension.      Palpations: Abdomen is soft. There is no mass.      Tenderness: There is no abdominal tenderness. There is no guarding.   Musculoskeletal:      Right lower leg: No edema.      Left lower leg: No edema.   Lymphadenopathy:      Head:      Right side of head: No submental or submandibular adenopathy.      Left side of head: No submental or submandibular adenopathy.      Cervical: No cervical adenopathy.      Right cervical: No superficial or deep cervical adenopathy.     Left cervical: No superficial or deep cervical adenopathy.      Upper Body:      Right upper body: No supraclavicular or axillary adenopathy.      Left upper body: No supraclavicular or axillary adenopathy.   Skin:     General: Skin is warm.      Comments: Right breast examination shows well-healed 2 to 3 o'clock position incision.  No other skin change, mass, or lymph node palpable.  Negative nipple discharge.  Patient localizes the discomfort at 7 to 8 o'clock position along the lateral chest wall.   Neurological:      Mental Status: She is alert and oriented to person, place, and time.      Cranial Nerves: No cranial nerve deficit.      Coordination: Coordination normal.   Psychiatric:         Behavior: Behavior normal.          MD [facility]

## 2024-05-07 ENCOUNTER — HOSPITAL ENCOUNTER (OUTPATIENT)
Dept: ULTRASOUND IMAGING | Facility: CLINIC | Age: 49
Discharge: HOME | End: 2024-05-07
Payer: COMMERCIAL

## 2024-05-07 ENCOUNTER — HOSPITAL ENCOUNTER (OUTPATIENT)
Dept: MAMMOGRAPHY | Facility: CLINIC | Age: 49
Discharge: HOME | End: 2024-05-07
Payer: COMMERCIAL

## 2024-05-07 DIAGNOSIS — N64.4 BREAST PAIN, RIGHT: ICD-10-CM

## 2024-05-07 PROCEDURE — 76642 ULTRASOUND BREAST LIMITED: CPT

## 2024-05-07 PROCEDURE — 77065 DX MAMMO INCL CAD UNI: CPT

## 2024-05-07 PROCEDURE — G0279 TOMOSYNTHESIS, MAMMO: HCPCS

## 2024-05-08 DIAGNOSIS — Z12.31 SCREENING MAMMOGRAM FOR BREAST CANCER: Primary | ICD-10-CM

## 2024-05-08 DIAGNOSIS — R92.30 DENSE BREAST: ICD-10-CM

## 2024-05-15 DIAGNOSIS — R07.89 CHEST WALL TENDERNESS: Primary | ICD-10-CM

## 2024-05-21 ENCOUNTER — OFFICE VISIT (OUTPATIENT)
Dept: OBGYN CLINIC | Facility: CLINIC | Age: 49
End: 2024-05-21
Payer: COMMERCIAL

## 2024-05-21 DIAGNOSIS — N64.4 MASTODYNIA OF RIGHT BREAST: ICD-10-CM

## 2024-05-21 DIAGNOSIS — G47.9 SLEEP DISTURBANCE: Primary | ICD-10-CM

## 2024-05-21 PROCEDURE — 99215 OFFICE O/P EST HI 40 MIN: CPT | Performed by: OBSTETRICS & GYNECOLOGY

## 2024-05-25 NOTE — PROGRESS NOTES
Assessment/Plan:      Diagnoses and all orders for this visit:    Sleep disturbance  -     DHEA-sulfate; Future  -     Cortisol Level, AM Specimen; Future  -     Testosterone; Future    Mastodynia of right breast        1) Reassurance given that breast pain not hormonal in nature, or it would be bilateral. Recommend Evening Primrose Oil and ground flaxseed prn for breast pain issue. PCP recommends CXR if pain persists. I would prefer an MRI  2) May continue with just progesterone by itself if menopausal symptoms are managed.  She is afraid of estradiol despite my reassurance.   3) We discussed the HPATG (hypothalamus/pituitary/adrenal/thyroid/gonadal) axis and the impact that hormonal deviation in one gland can have on another. It is not uncommon for ovarian declined to coincide or invoke thyroid and adrenal dysfunction as well. Cortisol spikes often the cause of premature awakening, as it is at its zenith at 3am. Discussed rationale for cortisol reduction, but will also draw labs to make sure no pathology needs to be addressed.   4) Email me with progress report in 1-2 months, and I will email her with lab test results. Follow up prn.    This was a 50 minute visit with greater than 50% of time spent in face to face counseling and coordination of care.    Subjective:     Patient ID: Julee Knight is a 48 y.o. female.    Julee Nelson presents for hormone consult follow up .  I saw her 3 months ago with typical menopausal symptoms, weight gain. Estradiol 1 mg/ progesterone 100 mg started.  Sleep still elusive, so PG increased to 200mg, Cortisol Manager supplement recommended.  Last month had episode of R sided breast pain that woke her in middle of night. Imaging negative. Also started spotting, so estradiol reduced to 0.5 mg  Since then:  1) Estradiol stopped for past 3 weeks, breast still mildly sore  2) Did not  Cortisol manager. Progesterone still taken.   No other changes in health status or family history.        Review of Systems   Constitutional: Negative.    Endocrine: Negative.    Genitourinary:         R sided breast pain   Musculoskeletal: Negative.    Neurological: Negative.    Psychiatric/Behavioral:  Positive for sleep disturbance.        Objective:     Physical Exam

## 2024-05-25 NOTE — PROGRESS NOTES
Julee Nelson presents for hormone consult follow up .  I saw her 3 months ago with typical menopausal symptoms, weight gain. Estradiol 1 mg/ progesterone 100 mg started.  Sleep still elusive, so PG increased to 200mg, Cortisol Manager supplement recommended.  Last month had episode of R sided breast pain that woke her in middle of night. Imaging negative. Also started spotting, so estradiol reduced to 0.5 mg  Since then:  1) Estradiol stopped for past 3 weeks, breast still mildly sore  2) Did not  Cortisol manager. Progesterone still taken.   No other changes in health status or family history.

## 2024-05-29 ENCOUNTER — OFFICE VISIT (OUTPATIENT)
Dept: PAIN MEDICINE | Facility: CLINIC | Age: 49
End: 2024-05-29
Payer: COMMERCIAL

## 2024-05-29 VITALS
HEIGHT: 66 IN | DIASTOLIC BLOOD PRESSURE: 72 MMHG | WEIGHT: 155 LBS | BODY MASS INDEX: 24.91 KG/M2 | SYSTOLIC BLOOD PRESSURE: 116 MMHG

## 2024-05-29 DIAGNOSIS — M54.12 CERVICAL RADICULOPATHY: Primary | ICD-10-CM

## 2024-05-29 DIAGNOSIS — M54.2 CERVICALGIA: ICD-10-CM

## 2024-05-29 PROCEDURE — 99214 OFFICE O/P EST MOD 30 MIN: CPT | Performed by: ANESTHESIOLOGY

## 2024-05-29 NOTE — PROGRESS NOTES
Assessment  1. Cervical radiculopathy    2. Cervicalgia      Patient presenting for follow up visit. She has a history of chronic right cervical radicular pain for greater than 1 year, worsening over the past several months.  Pain is consistent with cervical radicular pain accompanied by pain at times >7/10 on the pain scale with inability to participate in IADLs for >6 weeks. Patient has participated with physical therapy in 2022.  Patient has tried NSAIDs, gabapentin, Flexeril in the past with modest benefit.     Independently reviewed and interpreted cervical XR from 2022 - this showed mild degenerative changes with right foraminal narrowing from C3-C7.    Patient previously saw Dr. Curtis in 2022 and improved with a combination of PT, gabapentin, NSAIDs, Flexeril. She did not have a follow up after her initial visit due to improved symptoms. Recently she experienced a recurrence of the same symptoms which has now improved with ibuprofen.    Plan    Discussed referral to PT again as she has been improving with ibuprofen and time.     If pain flares up again, will recommend obtaining cervical MRI. Discussed an intervention such as JESSICA can be deferred at this time as she is improving.    Will consider gabapentin, Flexeril, steroid taper if her pain recurs to a significant degree. Advised to continue ibuprofen as needed for now.    Patient was informed she can contact /reach out to the office anytime if her symptoms worsen.     Reviewed external notes from PCP (5/6/24), PT (1/17/22) in regards to recent and prior relevant medical histories, treatment recommendations, medication and/or interventional treatment responses.    Reviewed renal function, CBC prior to recommending, initiating, continuing and/or adjusting medications.    Reviewed hemoglobin A1c, renal function, CBC and/or PT/INR prior to discussing/offering interventional modalities.    [place] Prescription Drug Monitoring Program report was reviewed  and was appropriate     My impressions and treatment recommendations were discussed in detail with the patient who verbalized understanding and had no further questions.  Discharge instructions were provided. I personally saw and examined the patient and I agree with the above discussed plan of care.    Orders Placed This Encounter   Procedures    Ambulatory referral to Physical Therapy     Standing Status:   Future     Standing Expiration Date:   5/29/2025     Referral Priority:   Routine     Referral Type:   Physical Therapy     Referral Reason:   Specialty Services Required     Requested Specialty:   Physical Therapy     Number of Visits Requested:   1     Expiration Date:   5/29/2025     No orders of the defined types were placed in this encounter.      History of Present Illness    Julee Knight is a 48 y.o. female presenting for follow up visit regarding  exam and evaluation of chronic right cervical radicular pain for greater than 1 year, worsening over the past several months. Pain was 8-10/10 during recent flare up but has reduced in severity since recent exacerbation. Pain is described as a constant aching, sharp pain throughout the entire day.    I have personally reviewed and/or updated the patient's past medical history, past surgical history, family history, social history, current medications, allergies, and vital signs today.     Review of Systems   Constitutional:  Negative for chills and fever.   HENT:  Negative for ear pain and sore throat.    Eyes:  Negative for pain and visual disturbance.   Respiratory:  Negative for cough and shortness of breath.    Cardiovascular:  Negative for chest pain and palpitations.   Gastrointestinal:  Negative for abdominal pain and vomiting.   Genitourinary:  Negative for dysuria and hematuria.   Musculoskeletal:  Positive for back pain and myalgias. Negative for arthralgias.   Skin:  Negative for color change and rash.   Neurological:  Positive for weakness.  Negative for seizures and syncope.   All other systems reviewed and are negative.      Patient Active Problem List   Diagnosis   (none) - all problems resolved or deleted       Past Medical History:   Diagnosis Date    Back pain     Varicella        History reviewed. No pertinent surgical history.    Family History   Problem Relation Age of Onset    Heart disease Mother         AFib    Prostate cancer Father 70    Cancer Father         Prostate    Skin cancer Father     Prostate cancer Brother     No Known Problems Maternal Aunt     Melanoma Paternal Aunt     No Known Problems Maternal Grandmother     No Known Problems Maternal Grandfather     Cancer Paternal Grandmother         Pancreatic Cancer    Colon cancer Paternal Grandfather     BRCA2 Negative Neg Hx     BRCA2 Positive Neg Hx     BRCA1 Positive Neg Hx     BRCA1 Negative Neg Hx     BRCA 1/2 Neg Hx     Ovarian cancer Neg Hx     Endometrial cancer Neg Hx     Breast cancer additional onset Neg Hx     Breast cancer Neg Hx        Social History     Occupational History    Not on file   Tobacco Use    Smoking status: Never    Smokeless tobacco: Never   Vaping Use    Vaping status: Never Used   Substance and Sexual Activity    Alcohol use: Yes     Alcohol/week: 2.0 standard drinks of alcohol     Types: 2 Glasses of wine per week    Drug use: Never    Sexual activity: Not Currently     Partners: Male     Birth control/protection: Abstinence, Condom Male       Current Outpatient Medications on File Prior to Visit   Medication Sig    ascorbic acid (VITAMIN C) 500 mg tablet Take 500 mg by mouth daily    Calcium Carbonate 1500 (600 Ca) MG TABS Take by mouth    Cholecalciferol (VITAMIN D3) 1000 units CAPS Take by mouth    estradiol (Estrace) 1 mg tablet Take 1 tablet (1 mg total) by mouth daily    Progesterone 200 MG CAPS Take 200 mg by mouth at bedtime     No current facility-administered medications on file prior to visit.       No Known Allergies    Physical Exam    BP  "116/72   Ht 5' 6\" (1.676 m)   Wt 70.3 kg (155 lb)   BMI 25.02 kg/m²     Constitutional: normal, well developed, well nourished, alert, in no distress and non-toxic and no overt pain behavior.  Eyes: anicteric  HEENT: grossly intact  Neck: supple, symmetric, trachea midline and no masses   Pulmonary:even and unlabored  Cardiovascular:No edema or pitting edema present  Skin:Normal without rashes or lesions and well hydrated  Psychiatric:Mood and affect appropriate  Neurologic: Motor function is grossly intact with no focal neurologic deficits   Musculoskeletal: Gait is normal. Tenderness in the right trapezius muscle consistent with trigger point.    Imaging  FINDINGS:     No fracture.      Anatomic alignment.     C5-C7 vertebral body anterosuperior chronic slight wedge deformity.       C5-6 mild, C6-C7 mild/moderate disc space narrowing.  C4-C5 through C6-C7 anterior degenerative change.      Right C3-C4, C4-C5, C6-C7 mild neuroforamen narrowing.     The prevertebral soft tissues are within normal limits.       The lung apices are clear.     IMPRESSION:     Mild degenerative changes     "

## 2024-06-12 ENCOUNTER — EVALUATION (OUTPATIENT)
Dept: PHYSICAL THERAPY | Facility: MEDICAL CENTER | Age: 49
End: 2024-06-12
Payer: COMMERCIAL

## 2024-06-12 DIAGNOSIS — M54.12 CERVICAL RADICULOPATHY: ICD-10-CM

## 2024-06-12 PROCEDURE — 97162 PT EVAL MOD COMPLEX 30 MIN: CPT | Performed by: PHYSICAL THERAPIST

## 2024-06-12 PROCEDURE — 97140 MANUAL THERAPY 1/> REGIONS: CPT | Performed by: PHYSICAL THERAPIST

## 2024-06-12 NOTE — PROGRESS NOTES
PT Evaluation     Today's date: 2024  Patient name: Julee Knight  : 1975  MRN: [id number]  Referring provider: Faizan Guillen MD  Dx:   Encounter Diagnosis     ICD-10-CM    1. Cervical radiculopathy  M54.12 Ambulatory referral to Physical Therapy                     Assessment/Plan  Patient is a very pleasant 48 yo female who presents with symptoms of neck pain/stiffness and right sided radiculopathy.  No signs of myelopathy and no red flags found on evaluation.  Symptoms are consistent with ERSR somatic dysfunction of lower cervical spine with concurrent nerve root compression most likely due to transforaminal stenosis.  Patient improved with gentle manual therapy and stretches and is expected to do well with skilled PT interventions.  Patient should be seen 1-2x a week for the next 3-6 weeks.     Subjective  Patient states that she had a similar issue in  and it resolved with PT and medications.  She has seen pain management who suggested that she go to PT again to address this problem however gave her the option if issues do not resolve to start with steroid.  Patient notes that this weekend symptoms worsened when she was moving things in her home and she started to get numbing and tingling in her right hand.  Most of the problem at this point has been stiffness in the right neck and pain in the right arm.      Objective  Red Flags: none  Pain: 5-8/10  Posture: forward head  AROM: limited in all plains of cervical movement most noted in rotation right with comparable sign and limited by 40%  PAROM: diminished mobility throughout upper cervical and lower cervical spine with comparable sign with UPA of C3-6 right  Palpation: TTP throughout cervical and thoracic musculature  Special Tests: + cervical distraction, +spurlings right, + ULTTa, no UMN signs, no sensation changes on testing. Strength in UE WFL and symmetrical   Coordination of Movement/strengthe: Patient demonstrates poor activation  of Longus Capitus and Longus Coli with loss of feed forward mechanism with reaching tasks.   Patient was able to perform activation with cueing.  Goals  - Pt I with initial HEP in 1-2 visits  - Improve ROM to full without pain  - Improved Longus Coli and Longus Capitus activation and return of feed forward mechanism.  - Increase Functional Status Measure measured by FOTO by MDMARTHA           Precautions: none      Manuals             C3-5 UPA right  Gr. Iv- 10sec x5            distraction 5min                                      Neuro Re-Ed                                                                                                        Ther Ex                                                                                                                     Ther Activity                                       Gait Training                                       Modalities

## 2024-06-14 ENCOUNTER — APPOINTMENT (OUTPATIENT)
Dept: LAB | Facility: MEDICAL CENTER | Age: 49
End: 2024-06-14
Payer: COMMERCIAL

## 2024-06-14 ENCOUNTER — APPOINTMENT (OUTPATIENT)
Dept: RADIOLOGY | Facility: MEDICAL CENTER | Age: 49
End: 2024-06-14
Payer: COMMERCIAL

## 2024-06-14 DIAGNOSIS — R07.89 CHEST WALL TENDERNESS: ICD-10-CM

## 2024-06-14 DIAGNOSIS — G47.9 SLEEP DISTURBANCE: ICD-10-CM

## 2024-06-14 DIAGNOSIS — Z00.8 HEALTH EXAMINATION IN POPULATION SURVEY: ICD-10-CM

## 2024-06-14 LAB
BASOPHILS # BLD AUTO: 0.05 THOUSANDS/ÂΜL (ref 0–0.1)
BASOPHILS NFR BLD AUTO: 1 % (ref 0–1)
CHOLEST SERPL-MCNC: 248 MG/DL
CORTIS AM PEAK SERPL-MCNC: 11.4 UG/DL (ref 6.7–22.6)
EOSINOPHIL # BLD AUTO: 0.22 THOUSAND/ÂΜL (ref 0–0.61)
EOSINOPHIL NFR BLD AUTO: 3 % (ref 0–6)
ERYTHROCYTE [DISTWIDTH] IN BLOOD BY AUTOMATED COUNT: 12.5 % (ref 11.6–15.1)
EST. AVERAGE GLUCOSE BLD GHB EST-MCNC: 114 MG/DL
HBA1C MFR BLD: 5.6 %
HCT VFR BLD AUTO: 43.1 % (ref 34.8–46.1)
HDLC SERPL-MCNC: 68 MG/DL
HGB BLD-MCNC: 13.8 G/DL (ref 11.5–15.4)
IMM GRANULOCYTES # BLD AUTO: 0.03 THOUSAND/UL (ref 0–0.2)
IMM GRANULOCYTES NFR BLD AUTO: 1 % (ref 0–2)
LDLC SERPL CALC-MCNC: 157 MG/DL (ref 0–100)
LYMPHOCYTES # BLD AUTO: 1.8 THOUSANDS/ÂΜL (ref 0.6–4.47)
LYMPHOCYTES NFR BLD AUTO: 28 % (ref 14–44)
MCH RBC QN AUTO: 30.1 PG (ref 26.8–34.3)
MCHC RBC AUTO-ENTMCNC: 32 G/DL (ref 31.4–37.4)
MCV RBC AUTO: 94 FL (ref 82–98)
MONOCYTES # BLD AUTO: 0.4 THOUSAND/ÂΜL (ref 0.17–1.22)
MONOCYTES NFR BLD AUTO: 6 % (ref 4–12)
NEUTROPHILS # BLD AUTO: 3.89 THOUSANDS/ÂΜL (ref 1.85–7.62)
NEUTS SEG NFR BLD AUTO: 61 % (ref 43–75)
NONHDLC SERPL-MCNC: 180 MG/DL
NRBC BLD AUTO-RTO: 0 /100 WBCS
PLATELET # BLD AUTO: 115 THOUSANDS/UL (ref 149–390)
PMV BLD AUTO: 10.4 FL (ref 8.9–12.7)
RBC # BLD AUTO: 4.59 MILLION/UL (ref 3.81–5.12)
TESTOST SERPL-MSCNC: 45 NG/DL
TRIGL SERPL-MCNC: 116 MG/DL
TSH SERPL DL<=0.05 MIU/L-ACNC: 1.59 UIU/ML (ref 0.45–4.5)
WBC # BLD AUTO: 6.39 THOUSAND/UL (ref 4.31–10.16)

## 2024-06-14 PROCEDURE — 71101 X-RAY EXAM UNILAT RIBS/CHEST: CPT

## 2024-06-14 PROCEDURE — 36415 COLL VENOUS BLD VENIPUNCTURE: CPT

## 2024-06-14 PROCEDURE — 82533 TOTAL CORTISOL: CPT

## 2024-06-14 PROCEDURE — 84403 ASSAY OF TOTAL TESTOSTERONE: CPT

## 2024-06-14 PROCEDURE — 80061 LIPID PANEL: CPT

## 2024-06-14 PROCEDURE — 82627 DEHYDROEPIANDROSTERONE: CPT

## 2024-06-15 LAB — DHEA-S SERPL-MCNC: 216 UG/DL (ref 41.2–243.7)

## 2024-06-20 ENCOUNTER — OFFICE VISIT (OUTPATIENT)
Dept: PHYSICAL THERAPY | Facility: MEDICAL CENTER | Age: 49
End: 2024-06-20
Payer: COMMERCIAL

## 2024-06-20 DIAGNOSIS — M54.12 CERVICAL RADICULOPATHY: Primary | ICD-10-CM

## 2024-06-20 PROCEDURE — 97140 MANUAL THERAPY 1/> REGIONS: CPT | Performed by: PHYSICAL THERAPIST

## 2024-06-20 PROCEDURE — 97012 MECHANICAL TRACTION THERAPY: CPT | Performed by: PHYSICAL THERAPIST

## 2024-06-20 NOTE — PROGRESS NOTES
Daily Note     Today's date: 2024  Patient name: Julee Knight  : 1975  MRN: [id number]  Referring provider: Faizan Guillen MD  Dx:   Encounter Diagnosis     ICD-10-CM    1. Cervical radiculopathy  M54.12                      Subjective: patient states that she is having some neck stiffness today and right sided tingling in her fingers.  Patient notes that it isn't too bad but very annoying at times.       Objective: See treatment diary below      Assessment: Tolerated treatment well. Patient exhibited good technique with therapeutic exercises and would benefit from continued PT.  Patient was progressed with manual therapy and trial of traction.  Patient noted that her symptoms improved with manuals but traction did not change her symptoms in the hand.        Plan: Continue per plan of care.      Precautions: none      Manuals             C3-5 UPA right  Gr. Iv- 10sec x5            distraction 5min            Thoracic pistol Gr. v            Cervical stretching manual  5 min            Neuro Re-Ed                                                                                                        Ther Ex                                                                                                                     Ther Activity                                       Gait Training                                       Modalities             Mechanical cervical traction 10min 25lbs 30/10

## 2024-06-24 ENCOUNTER — OFFICE VISIT (OUTPATIENT)
Dept: PHYSICAL THERAPY | Facility: MEDICAL CENTER | Age: 49
End: 2024-06-24
Payer: COMMERCIAL

## 2024-06-24 DIAGNOSIS — M54.12 CERVICAL RADICULOPATHY: Primary | ICD-10-CM

## 2024-06-24 PROCEDURE — 97110 THERAPEUTIC EXERCISES: CPT | Performed by: PHYSICAL THERAPIST

## 2024-06-24 PROCEDURE — 97140 MANUAL THERAPY 1/> REGIONS: CPT | Performed by: PHYSICAL THERAPIST

## 2024-06-24 NOTE — PROGRESS NOTES
Daily Note     Today's date: 2024  Patient name: Julee Knight  : 1975  MRN: [id number]  Referring provider: Faizan Guillen MD  Dx:   Encounter Diagnosis     ICD-10-CM    1. Cervical radiculopathy  M54.12                      Subjective: patient states that she is having some neck stiffness today and right sided tingling in her fingers.  Patient notes that it isn't too bad but very annoying at times.       Objective: See treatment diary below      Assessment: Tolerated treatment well. Patient exhibited good technique with therapeutic exercises and would benefit from continued PT.  Patient was progressed with manual therapy and trial of traction.  Patient noted that her symptoms improved with manuals but traction did not change her symptoms in the hand.        Plan: Continue per plan of care.      Precautions: none      Manuals             C3-5 UPA right  Gr. Iv- 10sec x5            distraction 5min            Thoracic pistol Gr. v            Cervical stretching manual  5 min            Neuro Re-Ed                                                                                                        Ther Ex                                                                                                                     Ther Activity                                       Gait Training                                       Modalities             Mechanical cervical traction 10min 25lbs 30/10

## 2024-06-28 ENCOUNTER — OFFICE VISIT (OUTPATIENT)
Dept: PHYSICAL THERAPY | Facility: MEDICAL CENTER | Age: 49
End: 2024-06-28
Payer: COMMERCIAL

## 2024-06-28 DIAGNOSIS — M54.12 CERVICAL RADICULOPATHY: Primary | ICD-10-CM

## 2024-06-28 PROCEDURE — 97110 THERAPEUTIC EXERCISES: CPT | Performed by: PHYSICAL THERAPIST

## 2024-06-28 PROCEDURE — 97140 MANUAL THERAPY 1/> REGIONS: CPT | Performed by: PHYSICAL THERAPIST

## 2024-06-28 NOTE — PROGRESS NOTES
Daily Note     Today's date: 2024  Patient name: Julee Knight  : 1975  MRN: [id number]  Referring provider: Faizan Guillen MD  Dx:   Encounter Diagnosis     ICD-10-CM    1. Cervical radiculopathy  M54.12                      Subjective: patient states that she is doing well but still dealing with on/off numbness throughout day       Objective: See treatment diary below      Assessment: Tolerated treatment well. Patient exhibited good technique with therapeutic exercises and would benefit from continued PT.  She did not note any benefit from mechanical traction, trial of manual traction with positional cervical opening as well today.         Plan: Continue per plan of care.      Precautions: none      Manuals             C3-5 UPA right  Gr. Iv- 10sec x5            distraction 5min            Thoracic pistol Gr. v            Cervical stretching manual  5 min            Neuro Re-Ed                                                                                                        Ther Ex                                                                                                                     Ther Activity                                       Gait Training                                       Modalities             Mechanical cervical traction Manual

## 2024-07-01 ENCOUNTER — OFFICE VISIT (OUTPATIENT)
Dept: PHYSICAL THERAPY | Facility: MEDICAL CENTER | Age: 49
End: 2024-07-01
Payer: COMMERCIAL

## 2024-07-01 DIAGNOSIS — M54.12 CERVICAL RADICULOPATHY: Primary | ICD-10-CM

## 2024-07-01 PROCEDURE — 97110 THERAPEUTIC EXERCISES: CPT | Performed by: PHYSICAL THERAPIST

## 2024-07-01 PROCEDURE — 97140 MANUAL THERAPY 1/> REGIONS: CPT | Performed by: PHYSICAL THERAPIST

## 2024-07-01 NOTE — PROGRESS NOTES
Daily Note     Today's date: 2024  Patient name: Julee Knight  : 1975  MRN: [id number]  Referring provider: Faizan Guillen MD  Dx:   Encounter Diagnosis     ICD-10-CM    1. Cervical radiculopathy  M54.12                      Subjective: patient states that she is doing well but still dealing with on/off numbness throughout day.  Symptoms continue to be about the same and not changing much.       Objective: See treatment diary below      Assessment: Tolerated treatment well. Patient exhibited good technique with therapeutic exercises and would benefit from continued PT.  Focus today on posturing exercises and strengthening which was tolerated without increased pain.      Plan: Continue per plan of care.      Precautions: none      Manuals             C3-5 UPA right  Gr. Iv- 10sec x5            distraction 5min            Thoracic pistol Gr. v            Cervical stretching manual  5 min            Neuro Re-Ed                                                                                                        Ther Ex             3 way looped scapular activation 15x2            Scap retractions 15            Shoulder extensions 15            Horizontal abduction 15                                                                Ther Activity                                       Gait Training                                       Modalities             Mechanical cervical traction Manual

## 2024-07-03 ENCOUNTER — OFFICE VISIT (OUTPATIENT)
Dept: PHYSICAL THERAPY | Facility: MEDICAL CENTER | Age: 49
End: 2024-07-03
Payer: COMMERCIAL

## 2024-07-03 DIAGNOSIS — M54.12 CERVICAL RADICULOPATHY: Primary | ICD-10-CM

## 2024-07-03 PROCEDURE — 97112 NEUROMUSCULAR REEDUCATION: CPT | Performed by: PHYSICAL THERAPIST

## 2024-07-03 PROCEDURE — 97110 THERAPEUTIC EXERCISES: CPT | Performed by: PHYSICAL THERAPIST

## 2024-07-03 PROCEDURE — 97140 MANUAL THERAPY 1/> REGIONS: CPT | Performed by: PHYSICAL THERAPIST

## 2024-07-03 NOTE — PROGRESS NOTES
Daily Note     Today's date: 7/3/2024  Patient name: Julee Knight  : 1975  MRN: [id number]  Referring provider: Faizan Guillen MD  Dx:   Encounter Diagnosis     ICD-10-CM    1. Cervical radiculopathy  M54.12                      Subjective: patient states that she is doing well but still dealing with on/off numbness throughout day.  Symptoms continue to be about the same and not changing much.       Objective: See treatment diary below      Assessment: Tolerated treatment well. Patient exhibited good technique with therapeutic exercises and would benefit from continued PT.  Focus today on posturing exercises and strengthening which was tolerated without increased pain.  Discussed ergonomics and home work station which may be a major cause of symptoms.     Plan: Continue per plan of care.      Precautions: none      Manuals             C3-5 UPA right  Gr. Iv- 10sec x5            distraction 5min            Thoracic pistol Gr. v            Cervical stretching manual  5 min            Neuro Re-Ed                                                                                                        Ther Ex             3 way looped scapular activation 15x2            Scap retractions 15            Shoulder extensions 15            Horizontal abduction 15                                                                Ther Activity                                       Gait Training                                       Modalities             Mechanical cervical traction Manual

## 2024-07-17 ENCOUNTER — OFFICE VISIT (OUTPATIENT)
Dept: PHYSICAL THERAPY | Facility: MEDICAL CENTER | Age: 49
End: 2024-07-17
Payer: COMMERCIAL

## 2024-07-17 DIAGNOSIS — M54.12 CERVICAL RADICULOPATHY: Primary | ICD-10-CM

## 2024-07-17 PROCEDURE — 97110 THERAPEUTIC EXERCISES: CPT | Performed by: PHYSICAL THERAPIST

## 2024-07-17 PROCEDURE — 97140 MANUAL THERAPY 1/> REGIONS: CPT | Performed by: PHYSICAL THERAPIST

## 2024-07-17 PROCEDURE — 97112 NEUROMUSCULAR REEDUCATION: CPT | Performed by: PHYSICAL THERAPIST

## 2024-07-17 NOTE — PROGRESS NOTES
Daily Note     Today's date: 2024  Patient name: Julee Knight  : 1975  MRN: [id number]  Referring provider: Faizan Guillen MD  Dx:   Encounter Diagnosis     ICD-10-CM    1. Cervical radiculopathy  M54.12                      Subjective: patient states that she is doing well but still dealing with on/off numbness throughout day.  Symptoms continue to be about the same and not changing much.       Objective: See treatment diary below      Assessment: Tolerated treatment well. Patient exhibited good technique with therapeutic exercises and would benefit from continued PT.  Focus today on posturing exercises and strengthening which was tolerated without increased pain.  Discussed ergonomics and home work station which may be a major cause of symptoms.     Plan: Continue per plan of care.      Precautions: none      Manuals             C3-5 UPA right  Gr. Iv- 10sec x5            distraction 5min            Thoracic pistol Gr. v            Cervical stretching manual  5 min            Neuro Re-Ed                                                                                                        Ther Ex             3 way looped scapular activation 15x2            Scap retractions 15            Shoulder extensions 15            Horizontal abduction 15                                                                Ther Activity                                       Gait Training                                       Modalities             Mechanical cervical traction Manual

## 2024-07-19 ENCOUNTER — OFFICE VISIT (OUTPATIENT)
Dept: PHYSICAL THERAPY | Facility: MEDICAL CENTER | Age: 49
End: 2024-07-19
Payer: COMMERCIAL

## 2024-07-19 DIAGNOSIS — M54.12 CERVICAL RADICULOPATHY: Primary | ICD-10-CM

## 2024-07-19 PROCEDURE — 97140 MANUAL THERAPY 1/> REGIONS: CPT | Performed by: PHYSICAL THERAPIST

## 2024-07-19 PROCEDURE — 97110 THERAPEUTIC EXERCISES: CPT | Performed by: PHYSICAL THERAPIST

## 2024-07-19 PROCEDURE — 97112 NEUROMUSCULAR REEDUCATION: CPT | Performed by: PHYSICAL THERAPIST

## 2024-07-19 NOTE — PROGRESS NOTES
Daily Note     Today's date: 2024  Patient name: Julee Knight  : 1975  MRN: [id number]  Referring provider: Faizan Guillen MD  Dx:   Encounter Diagnosis     ICD-10-CM    1. Cervical radiculopathy  M54.12                      Subjective: patient states that she is doing better.     Objective: See treatment diary below      Assessment: Tolerated treatment well. Patient exhibited good technique with therapeutic exercises and would benefit from continued PT.  Focus today on posturing exercises and strengthening which was tolerated without increased pain.  Discussed ergonomics and home work station which may be a major cause of symptoms.     Plan: Continue per plan of care.      Precautions: none      Manuals             C3-5 UPA right  Gr. Iv- 10sec x5            distraction 5min            Thoracic pistol Gr. v            Cervical stretching manual  5 min            Neuro Re-Ed                                                                                                        Ther Ex             3 way looped scapular activation 15x2            Scap retractions 15            Shoulder extensions 15            Horizontal abduction 15                                                                Ther Activity                                       Gait Training                                       Modalities             Mechanical cervical traction Manual

## 2024-07-24 ENCOUNTER — OFFICE VISIT (OUTPATIENT)
Dept: PHYSICAL THERAPY | Facility: MEDICAL CENTER | Age: 49
End: 2024-07-24
Payer: COMMERCIAL

## 2024-07-24 DIAGNOSIS — M54.12 CERVICAL RADICULOPATHY: Primary | ICD-10-CM

## 2024-07-24 PROCEDURE — 97110 THERAPEUTIC EXERCISES: CPT | Performed by: PHYSICAL THERAPIST

## 2024-07-24 PROCEDURE — 97140 MANUAL THERAPY 1/> REGIONS: CPT | Performed by: PHYSICAL THERAPIST

## 2024-07-24 PROCEDURE — 97112 NEUROMUSCULAR REEDUCATION: CPT | Performed by: PHYSICAL THERAPIST

## 2024-07-24 NOTE — PROGRESS NOTES
"Daily Note     Today's date: 2024  Patient name: Julee Knight  : 1975  MRN: [id number]  Referring provider: Faizan Guillen MD  Dx:   Encounter Diagnosis     ICD-10-CM    1. Cervical radiculopathy  M54.12                      Subjective: patient states that she was doing better but had some tingling in her back on Monday and was concerned about it.       Objective: See treatment diary below      Assessment: Tolerated treatment well. Patient exhibited good technique with therapeutic exercises and would benefit from continued PT.  Focus today on posturing exercises and strengthening which was tolerated without increased pain.  Discussed ergonomics and home work station which may be a major cause of symptoms.  Patient's symptoms were reduced with treatment and she was no longer having \"tingling\" which is most likely upper thoracic spasm.     Plan: Continue per plan of care.      Precautions: none      Manuals             C3-5 UPA right  Gr. Iv- 10sec x5            distraction 5min            Thoracic pistol Gr. v            Cervical stretching manual  5 min            Neuro Re-Ed                                                                                                        Ther Ex             3 way looped scapular activation 15x2            Scap retractions 15            Shoulder extensions 15            Horizontal abduction 15            Roller up the wall 10x            Foam roller 5 min            Thoracic extensions on / foam 10x                         Ther Activity                                       Gait Training                                       Modalities             Mechanical cervical traction                               "

## 2024-08-01 ENCOUNTER — OFFICE VISIT (OUTPATIENT)
Dept: PHYSICAL THERAPY | Facility: MEDICAL CENTER | Age: 49
End: 2024-08-01
Payer: COMMERCIAL

## 2024-08-01 DIAGNOSIS — M54.12 CERVICAL RADICULOPATHY: Primary | ICD-10-CM

## 2024-08-01 PROCEDURE — 97140 MANUAL THERAPY 1/> REGIONS: CPT | Performed by: PHYSICAL THERAPIST

## 2024-08-01 PROCEDURE — 97110 THERAPEUTIC EXERCISES: CPT | Performed by: PHYSICAL THERAPIST

## 2024-08-01 PROCEDURE — 97112 NEUROMUSCULAR REEDUCATION: CPT | Performed by: PHYSICAL THERAPIST

## 2024-08-01 NOTE — PROGRESS NOTES
Daily Note     Today's date: 2024  Patient name: Julee Knight  : 1975  MRN: [id number]  Referring provider: Faizan Guillen MD  Dx:   Encounter Diagnosis     ICD-10-CM    1. Cervical radiculopathy  M54.12                      Subjective: patient states that she has only had one instance of tingling in her upper back since last visit.      Objective: See treatment diary below      Assessment: Tolerated treatment well. Patient exhibited good technique with therapeutic exercises and would benefit from continued PT.  Focus today on posturing exercises and strengthening which was tolerated without increased pain.  Symptoms continue to reduce.  She still reports tightness in her right upper trap but this seems to be more of a postural and work related issue.      Plan: Continue per plan of care.      Precautions: none      Manuals             C3-5 UPA right  Gr. Iv- 10sec x5            distraction 5min            Thoracic pistol Gr. v            Cervical stretching manual  5 min            Neuro Re-Ed                                                                                                        Ther Ex             3 way looped scapular activation 15x2            Scap retractions 15            Shoulder extensions 15            Horizontal abduction 15            Roller up the wall 10x            Foam roller nerve glide  10x2            Thoracic extensions on 1/2 foam 10sec x6                         Ther Activity                                       Gait Training                                       Modalities             Mechanical cervical traction

## 2024-08-05 ENCOUNTER — OFFICE VISIT (OUTPATIENT)
Dept: PHYSICAL THERAPY | Facility: MEDICAL CENTER | Age: 49
End: 2024-08-05
Payer: COMMERCIAL

## 2024-08-05 DIAGNOSIS — M54.12 CERVICAL RADICULOPATHY: Primary | ICD-10-CM

## 2024-08-05 PROCEDURE — 97110 THERAPEUTIC EXERCISES: CPT | Performed by: PHYSICAL THERAPIST

## 2024-08-05 PROCEDURE — 97140 MANUAL THERAPY 1/> REGIONS: CPT | Performed by: PHYSICAL THERAPIST

## 2024-08-05 PROCEDURE — 97112 NEUROMUSCULAR REEDUCATION: CPT | Performed by: PHYSICAL THERAPIST

## 2024-08-05 NOTE — PROGRESS NOTES
Daily Note     Today's date: 2024  Patient name: Julee Knight  : 1975  MRN: [id number]  Referring provider: Faizan Guillen MD  Dx:   Encounter Diagnosis     ICD-10-CM    1. Cervical radiculopathy  M54.12                      Subjective: patient states that she has only had one instance of tingling in her upper back since last visit.      Objective: See treatment diary below      Assessment: Tolerated treatment well. Patient exhibited good technique with therapeutic exercises and would benefit from continued PT.  Focus today on posturing exercises and strengthening which was tolerated without increased pain.  Symptoms continue to reduce.  She still reports tightness in her right upper trap but this seems to be more of a postural and work related issue.      Plan: Continue per plan of care.      Precautions: none      Manuals             C3-5 UPA right  Gr. Iv- 10sec x5            distraction 5min            Thoracic pistol Gr. v            Cervical stretching manual  5 min            Neuro Re-Ed                                                                                                        Ther Ex             3 way looped scapular activation 15x2            Scap retractions 15            Shoulder extensions 15            Horizontal abduction 15            Roller up the wall 10x            Foam roller nerve glide  10x2            Thoracic extensions on 1/2 foam 10sec x6                         Ther Activity                                       Gait Training                                       Modalities             Mechanical cervical traction

## 2024-08-19 ENCOUNTER — OFFICE VISIT (OUTPATIENT)
Dept: PHYSICAL THERAPY | Facility: MEDICAL CENTER | Age: 49
End: 2024-08-19
Payer: COMMERCIAL

## 2024-08-19 DIAGNOSIS — M54.12 CERVICAL RADICULOPATHY: Primary | ICD-10-CM

## 2024-08-19 PROCEDURE — 97112 NEUROMUSCULAR REEDUCATION: CPT | Performed by: PHYSICAL THERAPIST

## 2024-08-19 PROCEDURE — 97110 THERAPEUTIC EXERCISES: CPT | Performed by: PHYSICAL THERAPIST

## 2024-08-19 PROCEDURE — 97140 MANUAL THERAPY 1/> REGIONS: CPT | Performed by: PHYSICAL THERAPIST

## 2024-08-19 NOTE — PROGRESS NOTES
Daily Note     Today's date: 2024  Patient name: Julee Knight  : 1975  MRN: [id number]  Referring provider: Faizan Guillen MD  Dx:   Encounter Diagnosis     ICD-10-CM    1. Cervical radiculopathy  M54.12                      Subjective: patient states that she has no tingling in her upper back since last visit.  Just soreness in mid back at this point.     Objective: See treatment diary below      Assessment: Tolerated treatment well. Patient exhibited good technique with therapeutic exercises and would benefit from continued PT.  Focus today on posturing exercises and strengthening which was tolerated without increased pain.  Symptoms continue to reduce.  Patient will continue on her own for 4 weeks and check in to see how she is feeling at that point.       Plan: Continue per plan of care.      Precautions: none      Manuals             C3-5 UPA right  Gr. Iv- 10sec x5            distraction 5min            Thoracic pistol Gr. v            Cervical stretching manual  5 min            Neuro Re-Ed                                                                                                        Ther Ex             3 way looped scapular activation 15x2            Scap retractions 15            Shoulder extensions 15            Horizontal abduction 15            Roller up the wall 10x            Foam roller nerve glide  10x2            Thoracic extensions on 1/2 foam 10sec x6                         Ther Activity                                       Gait Training                                       Modalities             Mechanical cervical traction

## 2024-10-11 ENCOUNTER — OFFICE VISIT (OUTPATIENT)
Dept: URGENT CARE | Facility: CLINIC | Age: 49
End: 2024-10-11
Payer: COMMERCIAL

## 2024-10-11 VITALS
RESPIRATION RATE: 18 BRPM | TEMPERATURE: 98.3 F | DIASTOLIC BLOOD PRESSURE: 64 MMHG | HEART RATE: 79 BPM | BODY MASS INDEX: 25.71 KG/M2 | SYSTOLIC BLOOD PRESSURE: 120 MMHG | WEIGHT: 160 LBS | OXYGEN SATURATION: 98 % | HEIGHT: 66 IN

## 2024-10-11 DIAGNOSIS — J02.9 VIRAL PHARYNGITIS: Primary | ICD-10-CM

## 2024-10-11 LAB
S PYO AG THROAT QL: NEGATIVE
VALID CONTROL: NORMAL

## 2024-10-11 PROCEDURE — 87880 STREP A ASSAY W/OPTIC: CPT

## 2024-10-11 PROCEDURE — 99213 OFFICE O/P EST LOW 20 MIN: CPT

## 2024-10-11 PROCEDURE — 87070 CULTURE OTHR SPECIMN AEROBIC: CPT

## 2024-10-11 NOTE — PROGRESS NOTES
[facility]        NAME: Julee Knight is a 49 y.o. female  : 1975    MRN: [id number]  DATE: 2024  TIME: 1:59 PM    Assessment and Plan   Viral pharyngitis [J02.9]  1. Viral pharyngitis  POCT rapid ANTIGEN strepA    Throat culture    Throat culture            Patient Instructions   Saline nasal spray to each nostril as often as you want  Flonase nasal spray to each nostril daily  Mucinex (Blue box) as directed  Gargle with salt water  Increase your fluids  Tylenol or motrin for discomfort  We will sent you your throat swab for a culture       Follow up with PCP in 3-5 days.  Proceed to  ER if symptoms worsen.    If tests have been performed at [facility], our office will contact you with results if changes need to be made to the care plan discussed with you at the visit.  [facility]    Chief Complaint     Chief Complaint   Patient presents with    Sore Throat     Pt began with sore throat yesterday - today worse and now with right ear pain.  Denies fever.  Has been taking Advil w/some relief and lozengers         History of Present Illness       This is a 49 year old female who presents with sore throat and nasal congestion, and runny nose, and pressure in both ears.  She denies any fever or chills.  Rapid strep in the office was negative.    Sore Throat   This is a new problem. The current episode started yesterday. The problem has been waxing and waning. The pain is worse on the right side. There has been no fever. The pain is at a severity of 4/10. The pain is mild. Associated symptoms include congestion. Pertinent negatives include no abdominal pain, coughing, diarrhea, drooling, ear discharge, ear pain, headaches, hoarse voice, plugged ear sensation, neck pain, shortness of breath, stridor, swollen glands or trouble swallowing.       Review of Systems   Review of Systems   HENT:  Positive for congestion and sore throat. Negative for  drooling, ear discharge, ear pain, hoarse voice and trouble swallowing.    Respiratory:  Negative for cough, shortness of breath and stridor.    Gastrointestinal:  Negative for abdominal pain and diarrhea.   Musculoskeletal:  Negative for neck pain.   Neurological:  Negative for headaches.         Current Medications       Current Outpatient Medications:     ascorbic acid (VITAMIN C) 500 mg tablet, Take 500 mg by mouth daily, Disp: , Rfl:     Calcium Carbonate 1500 (600 Ca) MG TABS, Take by mouth, Disp: , Rfl:     Cholecalciferol (VITAMIN D3) 1000 units CAPS, Take by mouth, Disp: , Rfl:     estradiol (Estrace) 1 mg tablet, Take 1 tablet (1 mg total) by mouth daily, Disp: 30 tablet, Rfl: 11    Progesterone 200 MG CAPS, Take 200 mg by mouth at bedtime, Disp: 30 capsule, Rfl: 11    Current Allergies     Allergies as of 10/11/2024    (No Known Allergies)            The following portions of the patient's history were reviewed and updated as appropriate: allergies, current medications, past family history, past medical history, past social history, past surgical history and problem list.     Past Medical History:   Diagnosis Date    Back pain     Varicella        History reviewed. No pertinent surgical history.    Family History   Problem Relation Age of Onset    Heart disease Mother         AFib    Prostate cancer Father 70    Cancer Father         Prostate    Skin cancer Father     Prostate cancer Brother     No Known Problems Maternal Aunt     Melanoma Paternal Aunt     No Known Problems Maternal Grandmother     No Known Problems Maternal Grandfather     Cancer Paternal Grandmother         Pancreatic Cancer    Colon cancer Paternal Grandfather     BRCA2 Negative Neg Hx     BRCA2 Positive Neg Hx     BRCA1 Positive Neg Hx     BRCA1 Negative Neg Hx     BRCA 1/2 Neg Hx     Ovarian cancer Neg Hx     Endometrial cancer Neg Hx     Breast cancer additional onset Neg Hx     Breast cancer Neg Hx          Medications have been  "verified.        Objective   /64   Pulse 79   Temp 98.3 °F (36.8 °C) (Tympanic)   Resp 18   Ht 5' 6\" (1.676 m)   Wt 72.6 kg (160 lb)   SpO2 98%   BMI 25.82 kg/m²   No LMP recorded. Patient is premenopausal.       Physical Exam     Physical Exam  Constitutional:       Appearance: She is well-developed.   HENT:      Head: Normocephalic and atraumatic.      Right Ear: Tympanic membrane and ear canal normal. No middle ear effusion. Tympanic membrane is not erythematous.      Left Ear: Tympanic membrane and ear canal normal.  No middle ear effusion. Tympanic membrane is not erythematous.      Nose: Congestion present.      Mouth/Throat:      Pharynx: Posterior oropharyngeal erythema present. No pharyngeal swelling or oropharyngeal exudate.   Cardiovascular:      Rate and Rhythm: Normal rate and regular rhythm.      Heart sounds: Normal heart sounds.   Pulmonary:      Effort: Pulmonary effort is normal.      Breath sounds: Normal breath sounds.   Abdominal:      General: Bowel sounds are normal.      Palpations: Abdomen is soft.   Lymphadenopathy:      Cervical: No cervical adenopathy.   Skin:     General: Skin is warm.      Capillary Refill: Capillary refill takes less than 2 seconds.   Neurological:      General: No focal deficit present.      Mental Status: She is alert and oriented to person, place, and time.   Psychiatric:         Mood and Affect: Mood normal.                   "

## 2024-10-11 NOTE — PATIENT INSTRUCTIONS
Saline nasal spray to each nostril as often as you want  Flonase nasal spray to each nostril daily  Mucinex (Blue box) as directed  Gargle with salt water  Increase your fluids  Tylenol or motrin for discomfort  We will sent your throat swab for a culture

## 2024-10-13 LAB — BACTERIA THROAT CULT: NORMAL

## 2024-10-24 ENCOUNTER — OFFICE VISIT (OUTPATIENT)
Age: 49
End: 2024-10-24
Payer: COMMERCIAL

## 2024-10-24 VITALS
SYSTOLIC BLOOD PRESSURE: 120 MMHG | DIASTOLIC BLOOD PRESSURE: 68 MMHG | WEIGHT: 160 LBS | HEIGHT: 66 IN | HEART RATE: 104 BPM | TEMPERATURE: 97.5 F | OXYGEN SATURATION: 99 % | BODY MASS INDEX: 25.71 KG/M2

## 2024-10-24 DIAGNOSIS — J02.9 PHARYNGITIS, UNSPECIFIED ETIOLOGY: Primary | ICD-10-CM

## 2024-10-24 DIAGNOSIS — E78.5 HYPERLIPIDEMIA, UNSPECIFIED HYPERLIPIDEMIA TYPE: ICD-10-CM

## 2024-10-24 PROCEDURE — 99213 OFFICE O/P EST LOW 20 MIN: CPT | Performed by: INTERNAL MEDICINE

## 2024-10-24 RX ORDER — AMOXICILLIN 500 MG/1
500 CAPSULE ORAL EVERY 8 HOURS SCHEDULED
Qty: 30 CAPSULE | Refills: 0 | Status: SHIPPED | OUTPATIENT
Start: 2024-10-24 | End: 2024-11-03

## 2024-10-24 NOTE — PROGRESS NOTES
"Assessment/Plan:           Problem List Items Addressed This Visit    None  Visit Diagnoses       Pharyngitis, unspecified etiology    -  Primary    Relevant Medications    amoxicillin (AMOXIL) 500 mg capsule    Hyperlipidemia, unspecified hyperlipidemia type        Relevant Orders    Lipid panel         Patient to start antibiotic warm saline gargles NSAID use.  #2 as far as dyslipidemia is concerned on repeat blood work in December.  If cholesterol remains elevated CAC would be ordered.  Subjective:      Patient ID: Julee Knight is a 49 y.o. female.    HPI  Patient is here to follow-up on her breast pain patient has been going on since 10/7sore throat started with no fever or chills or cough.  She was seen in urgent care 1011 manage strep throat culture which was negative.  She did not check COVID at home.  She has not had any fevers however she has persistent right side tonsillar discomfort and right ear discomfort.  See exam below.  The following portions of the patient's history were reviewed and updated as appropriate: allergies, current medications, past medical history, past social history, and problem list.    Review of Systems      Objective:      /68 (BP Location: Left arm, Patient Position: Sitting, Cuff Size: Standard)   Pulse 104   Temp 97.5 °F (36.4 °C) (Temporal)   Ht 5' 6\" (1.676 m)   Wt 72.6 kg (160 lb)   SpO2 99%   BMI 25.82 kg/m²          Physical Exam  HENT:      Right Ear: Tympanic membrane normal.      Ears:      Comments: Erythema minimal     Mouth/Throat:      Pharynx: Posterior oropharyngeal erythema present. No oropharyngeal exudate.      Comments: Small ulcer right lateral part of the tongue    No ulcer seen elsewhere  Lymphadenopathy:      Cervical: Cervical adenopathy present.   Neurological:      Mental Status: She is alert.                 "

## 2024-11-05 DIAGNOSIS — J30.2 SEASONAL ALLERGIES: Primary | ICD-10-CM

## 2024-11-05 RX ORDER — PREDNISONE 20 MG/1
20 TABLET ORAL DAILY
Qty: 5 TABLET | Refills: 0 | Status: SHIPPED | OUTPATIENT
Start: 2024-11-05 | End: 2024-11-10

## 2024-11-10 PROBLEM — J02.9 VIRAL PHARYNGITIS: Status: RESOLVED | Noted: 2024-10-11 | Resolved: 2024-11-10

## 2024-11-13 DIAGNOSIS — J30.2 SEASONAL ALLERGIES: Primary | ICD-10-CM

## 2024-11-13 RX ORDER — MONTELUKAST SODIUM 10 MG/1
10 TABLET ORAL
Qty: 30 TABLET | Refills: 5 | Status: SHIPPED | OUTPATIENT
Start: 2024-11-13

## 2024-12-19 DIAGNOSIS — M26.609 TMJ DYSFUNCTION: Primary | ICD-10-CM

## 2024-12-19 DIAGNOSIS — H69.93 EUSTACHIAN TUBE DYSFUNCTION, BILATERAL: ICD-10-CM

## 2024-12-30 ENCOUNTER — APPOINTMENT (OUTPATIENT)
Dept: PHYSICAL THERAPY | Facility: CLINIC | Age: 49
End: 2024-12-30
Payer: COMMERCIAL

## 2024-12-31 ENCOUNTER — EVALUATION (OUTPATIENT)
Dept: PHYSICAL THERAPY | Facility: CLINIC | Age: 49
End: 2024-12-31
Payer: COMMERCIAL

## 2024-12-31 DIAGNOSIS — M26.609 TMJ DYSFUNCTION: Primary | ICD-10-CM

## 2024-12-31 PROCEDURE — 97161 PT EVAL LOW COMPLEX 20 MIN: CPT | Performed by: PHYSICAL THERAPIST

## 2024-12-31 PROCEDURE — 97110 THERAPEUTIC EXERCISES: CPT | Performed by: PHYSICAL THERAPIST

## 2024-12-31 NOTE — PROGRESS NOTES
PT Evaluation     Today's date: 2024  Patient name: Julee Knight  : 1975  MRN: [id number]  Referring provider: Bertha Mchugh MD  Dx:   Encounter Diagnosis     ICD-10-CM    1. TMJ dysfunction  M26.609 Ambulatory Referral to Physical Therapy                     Assessment  Impairments: abnormal muscle firing, abnormal muscle tone, abnormal or restricted ROM, abnormal movement, activity intolerance, lacks appropriate home exercise program and pain with function    Assessment details: Julee Knight is a 49 y.o. female presenting to physical therapy with TMJD and presents withpain and decreased activity tolerance.  Assessment indicates TMJ disc displacement with reduction.  Secondary to these impairments, patient has increased difficulty performing ADL's.  Julee Nelson would benefit from skilled PT to address these issues and maximize function.  Thank you for the referral.       Understanding of Dx/Px/POC: excellent     Prognosis: excellent    Goals  STG (4 weeks)  1. Patient will be independent with HEP  2. Decrease pain at worst by 2 points on NPRS  3. Increase maxillary opening to controlled motion without audible cavitation     LTG (8 weeks)  1. Decrease pain at worst from 4 points on NPRS  2. Increase tolerance for opening to full ROM without cavitation or pain  3. Increase FOTO score > or equal to expected outcome        Plan  Patient would benefit from: skilled PT    Planned therapy interventions: joint mobilization, manual therapy, ADL training, home exercise program, therapeutic exercise, stretching, strengthening, patient education, neuromuscular re-education, activity modification and postural training    Frequency: 1x week  Duration in weeks: 8  Treatment plan discussed with: patient    Subjective Evaluation    History of Present Illness  Mechanism of injury: Patient reports to outpatient PT secondary to the onset of right sided TMJD.  Patient states that the pain began several weeks prior  associated with compressing food and notes a chronic history of popping that appears to be increasing in frequency.  Patient describes the pain as achy which is worse with chewing and improved with activity avoidance.  Patient also notes a history of aural fullness on the right.  Patient works in the [employer] for [employer] and notes difficulty with work duties, ADL's and leisure functions as a result.  Patients goals for PT are to decrease the pain and return to PLOF.            Recurrent probem    Quality of life: excellent    Patient Goals  Patient goals for therapy: independence with ADLs/IADLs, increased motion and decreased pain    Pain  Current pain ratin  At best pain ratin  At worst pain ratin  Relieving factors: rest  Exacerbated by: chewing.  Progression: worsening    Social Support    Employment status: working    Diagnostic Tests  No diagnostic tests performed  Treatments  No previous or current treatments    Objective   TMJ   Scalloping of tongue: yes  Cusp wear: no  Jaw trauma: no  Prognathia: no  Retrognathia: no  Morsicatio buccarum: no  Joint sounds left: normal  Joint sounds right: clicking and popping  Lateral bite test, Left: no pain  Lateral bite test, right: no pain  Opening (mm): 56 and within normal limits   Lateral excursion, left (mm): 15 and within normal limits  Lateral excursion, right (mm)t: 15 and within normal limits   Protrusion (mm): within normal limits   ROM comments: (+) poor lingual control with opening             Precautions: None      Manuals                                                                 Neuro Re-Ed                                                                                                        Ther Ex             TMJ tongue clicks 3x10            TMJ controlled opening 3x10            TMJ 4 step disc recapture  x10            Postural correction at wall w/ retraction             Scapular retraction w/ ER                                                     Ther Activity                                       Gait Training                                       Modalities

## 2025-01-06 ENCOUNTER — APPOINTMENT (OUTPATIENT)
Dept: PHYSICAL THERAPY | Facility: CLINIC | Age: 50
End: 2025-01-06
Payer: COMMERCIAL

## 2025-01-08 ENCOUNTER — OFFICE VISIT (OUTPATIENT)
Dept: PHYSICAL THERAPY | Facility: CLINIC | Age: 50
End: 2025-01-08
Payer: COMMERCIAL

## 2025-01-08 DIAGNOSIS — M26.609 TMJ DYSFUNCTION: Primary | ICD-10-CM

## 2025-01-08 PROCEDURE — 97110 THERAPEUTIC EXERCISES: CPT | Performed by: PHYSICAL THERAPIST

## 2025-01-08 PROCEDURE — 97140 MANUAL THERAPY 1/> REGIONS: CPT | Performed by: PHYSICAL THERAPIST

## 2025-01-08 NOTE — PROGRESS NOTES
"Daily Note     Today's date: 2025  Patient name: Julee Knight  : 1975  MRN: [id number]  Referring provider: Bertha Mchugh MD  Dx:   Encounter Diagnosis     ICD-10-CM    1. TMJ dysfunction  M26.609                      Subjective: Patient reports no significant change since IE.  States that she had ENT which ruled out other causes.       Objective: See treatment diary below    (+) R lateral excursion reduction      Assessment: (+) R disc displacement with reduction remains.  Difficulty achieving improved tongue control for mandibular opening.  Added lateral excursion for condylar remodeling using a tic tac for at home which was tolerated well.  Updated HEP accordingly.       Plan: Continue per plan of care.      Precautions: None      Manuals  1           C2 lateral glides on the right  G4           R suboccipital release  5'                                     Neuro Re-Ed                                                                                                        Ther Ex             TMJ tongue clicks 3x10 3x10           TMJ controlled opening 3x10 3x10           TMJ 4 step disc recapture  x10 2x10           Postural correction at wall w/ retraction  2x10 x5\"           Scapular retraction w/ ER - green  3x10           Lateral excursion bite w/ finger biofeedback to the right  2x10                                     Ther Activity                                       Gait Training                                       Modalities                                              "

## 2025-01-13 ENCOUNTER — OFFICE VISIT (OUTPATIENT)
Dept: PHYSICAL THERAPY | Facility: CLINIC | Age: 50
End: 2025-01-13
Payer: COMMERCIAL

## 2025-01-13 DIAGNOSIS — M26.609 TMJ DYSFUNCTION: Primary | ICD-10-CM

## 2025-01-13 PROCEDURE — 97140 MANUAL THERAPY 1/> REGIONS: CPT | Performed by: PHYSICAL THERAPIST

## 2025-01-13 PROCEDURE — 97110 THERAPEUTIC EXERCISES: CPT | Performed by: PHYSICAL THERAPIST

## 2025-01-13 NOTE — PROGRESS NOTES
"Daily Note     Today's date: 2025  Patient name: Julee Knight  : 1975  MRN: [id number]  Referring provider: Bertha Mchugh MD  Dx:   Encounter Diagnosis     ICD-10-CM    1. TMJ dysfunction  M26.609                      Subjective: Patient states that her discomfort with eating is less but still feels full in the right ear.  Remains compliant with her HEP.        Objective: See treatment diary below    (+) R lateral excursion reduction      Assessment: Significant improvement on lingual control and controlled opening.  Disc displacement with reduction remains, less audible popping through full ROM per improving control.  Cues required to improve opening awareness to limit deviation and prevent past retraction with lateral excursion bite.       Plan: Continue per plan of care.      Precautions: None      Manuals           C2 lateral glides on the right  G4           R suboccipital release  5'                                     Neuro Re-Ed                                                                                                        Ther Ex             TMJ tongue clicks 3x10 3x10 3x10          TMJ controlled opening 3x10 3x10 3x10          TMJ 4 step disc recapture  x10 2x10 2x10          Postural correction at wall w/ retraction  2x10 x5\" 2x10 x5\"          Scapular retraction w/ ER - green  3x10 3x10          Lateral excursion bite w/ finger biofeedback to the right  2x10 2x10          Right medial pterygoid release   2x10                       Ther Activity                                       Gait Training                                       Modalities                                              "

## 2025-01-27 ENCOUNTER — APPOINTMENT (OUTPATIENT)
Dept: PHYSICAL THERAPY | Facility: CLINIC | Age: 50
End: 2025-01-27
Payer: COMMERCIAL

## 2025-01-29 ENCOUNTER — OFFICE VISIT (OUTPATIENT)
Dept: PHYSICAL THERAPY | Facility: CLINIC | Age: 50
End: 2025-01-29
Payer: COMMERCIAL

## 2025-01-29 DIAGNOSIS — M26.609 TMJ DYSFUNCTION: Primary | ICD-10-CM

## 2025-01-29 PROCEDURE — 97110 THERAPEUTIC EXERCISES: CPT | Performed by: PHYSICAL THERAPIST

## 2025-01-29 PROCEDURE — 97140 MANUAL THERAPY 1/> REGIONS: CPT | Performed by: PHYSICAL THERAPIST

## 2025-01-29 NOTE — PROGRESS NOTES
"Daily Note     Today's date: 2025  Patient name: Julee Knight  : 1975  MRN: [id number]  Referring provider: Bertha Mchugh MD  Dx:   Encounter Diagnosis     ICD-10-CM    1. TMJ dysfunction  M26.609                        Subjective: Patient states that her discomfort with eating is less but still feels full in the right ear.  Remains compliant with her HEP.        Objective: See treatment diary below    (+) R lateral excursion reduction notes again with decreased overall deviation    Controlled opening to 38mm without audible pop    Assessment: Patient demonstrating steady improvement in controlled opening and decreased deviation despite continued disc displacement with reduction. Significant improvement in right lateral excursion without pain.        Plan: Continue per plan of care.      Precautions: None      Manuals          C2 lateral glides on the right  G4           R suboccipital release  5'                                     Neuro Re-Ed                                                                                                        Ther Ex             TMJ tongue clicks 3x10 3x10 3x10 3x10         TMJ controlled opening 3x10 3x10 3x10 3x10         TMJ 4 step disc recapture  x10 2x10 2x10 2x10         Postural correction at wall w/ retraction  2x10 x5\" 2x10 x5\"          Scapular retraction w/ ER - green  3x10 3x10 3x10         Lateral excursion bite w/ finger biofeedback to the right  2x10 2x10 2x10         Right medial pterygoid release   2x10 2x10         Foam roll thoracic extension    10 x5\"         Ther Activity                                       Gait Training                                       Modalities                                              "

## 2025-02-10 ENCOUNTER — OFFICE VISIT (OUTPATIENT)
Dept: PHYSICAL THERAPY | Facility: CLINIC | Age: 50
End: 2025-02-10
Payer: COMMERCIAL

## 2025-02-10 DIAGNOSIS — M26.609 TMJ DYSFUNCTION: Primary | ICD-10-CM

## 2025-02-10 PROCEDURE — 97110 THERAPEUTIC EXERCISES: CPT | Performed by: PHYSICAL THERAPIST

## 2025-02-10 PROCEDURE — 97140 MANUAL THERAPY 1/> REGIONS: CPT | Performed by: PHYSICAL THERAPIST

## 2025-02-10 NOTE — PROGRESS NOTES
PT Evaluation     Today's date: 2/10/2025  Patient name: Julee Knight  : 1975  MRN: [id number]  Referring provider: Bertha Mchugh MD  Dx:   Encounter Diagnosis     ICD-10-CM    1. TMJ dysfunction  M26.609                        Assessment  Impairments: abnormal muscle firing, abnormal muscle tone, abnormal or restricted ROM, abnormal movement, activity intolerance, lacks appropriate home exercise program and pain with function    Assessment details: Patient is a 49 y.o. year old female who attended physical therapy for 6 treatment sessions regarding TMJD. Patient reports significant improvement at this time which correlates with FOTO scoring.  Patient has shown improvement throughout PT by demonstrating decreased pain, increased range of motion, increased strength, and improved tolerance to activity.  Secondary to achieving functional goals and independence with comprehensive Home Exercise Program, Julee Nelson will be discharged from PT at this time.  Thank you.         Understanding of Dx/Px/POC: excellent     Prognosis: excellent    Goals  STG (4 weeks)  1. Patient will be independent with HEP (MET)  2. Decrease pain at worst by 2 points on NPRS (MET)  3. Increase maxillary opening to controlled motion without audible cavitation (PARTIALLY MET)     LTG (8 weeks)  1. Decrease pain at worst from 4 points on NPRS (MET)  2. Increase tolerance for opening to full ROM without cavitation or pain (NOT MET)  3. Increase FOTO score > or equal to expected outcome (MET)        Plan  Patient would benefit from: skilled PT    Planned therapy interventions: joint mobilization, manual therapy, ADL training, home exercise program, therapeutic exercise, stretching, strengthening, patient education, neuromuscular re-education, activity modification and postural training    Treatment plan discussed with: patient  Plan details: DC      Subjective Evaluation    History of Present Illness  Mechanism of injury: Patient reports to  outpatient PT secondary to the onset of right sided TMJD.  Patient states that the pain began several weeks prior associated with compressing food and notes a chronic history of popping that appears to be increasing in frequency.  Patient describes the pain as achy which is worse with chewing and improved with activity avoidance.  Patient also notes a history of aural fullness on the right.  Patient works in the [employer] for [employer] and notes difficulty with work duties, ADL's and leisure functions as a result.  Patients goals for PT are to decrease the pain and return to PLOF.      2/10/2025: Patient reports HEP compliance and resolution of pain with mastication.  Does note some reduction in aural fullness but cavitation with opening remains. Patient is confident in continued progress with independent HEP.          Recurrent probem    Quality of life: excellent    Patient Goals  Patient goals for therapy: independence with ADLs/IADLs, increased motion and decreased pain    Pain  Current pain ratin  At best pain ratin  At worst pain ratin  Relieving factors: rest  Exacerbated by: chewing.  Progression: worsening    Social Support    Employment status: working    Diagnostic Tests  No diagnostic tests performed  Treatments  No previous or current treatments      Objective   TMJ   Scalloping of tongue: yes  Cusp wear: no  Jaw trauma: no  Prognathia: no  Retrognathia: no  Morsicatio buccarum: no  Joint sounds left: normal  Joint sounds right: clicking and popping  Lateral bite test, Left: no pain  Lateral bite test, right: no pain  Opening (mm): 56 and within normal limits   Lateral excursion, left (mm): 20 and within normal limits  Lateral excursion, right (mm)t: 16 and within normal limits   Protrusion (mm): within normal limits   ROM comments: (+) poor lingual control with opening (SIGNIFICANT IMPROVEMENT AT RE-EVAL)             Manuals 12/31 1/8 1/13 1/29 2/10        C2 lateral glides on  "the right  G4           R suboccipital release  5'           Re-eval     15'                     Neuro Re-Ed                                                                                                        Ther Ex             TMJ tongue clicks 3x10 3x10 3x10 3x10         TMJ controlled opening 3x10 3x10 3x10 3x10 3x10        TMJ 4 step disc recapture  x10 2x10 2x10 2x10         Postural correction at wall w/ retraction  2x10 x5\" 2x10 x5\"          Scapular retraction w/ ER - green  3x10 3x10 3x10         Lateral excursion bite w/ finger biofeedback to the right  2x10 2x10 2x10         Right medial pterygoid release   2x10 2x10 2x10        Foam roll thoracic extension    10 x5\"         Self suboccipital release w/ half foam roll     reviewed        Ther Activity                                       Gait Training                                       Modalities                                                  "

## 2025-02-11 ENCOUNTER — OFFICE VISIT (OUTPATIENT)
Age: 50
End: 2025-02-11
Payer: COMMERCIAL

## 2025-02-11 VITALS
SYSTOLIC BLOOD PRESSURE: 130 MMHG | BODY MASS INDEX: 26.2 KG/M2 | WEIGHT: 163 LBS | DIASTOLIC BLOOD PRESSURE: 90 MMHG | HEIGHT: 66 IN | TEMPERATURE: 98.3 F | OXYGEN SATURATION: 98 % | HEART RATE: 88 BPM

## 2025-02-11 DIAGNOSIS — L73.9 FOLLICULITIS: Primary | ICD-10-CM

## 2025-02-11 PROCEDURE — 99213 OFFICE O/P EST LOW 20 MIN: CPT | Performed by: INTERNAL MEDICINE

## 2025-02-11 RX ORDER — CLINDAMYCIN PHOSPHATE 10 UG/ML
LOTION TOPICAL 2 TIMES DAILY
Qty: 60 ML | Refills: 0 | Status: SHIPPED | OUTPATIENT
Start: 2025-02-11 | End: 2025-02-16

## 2025-02-11 NOTE — PROGRESS NOTES
"Name: Julee Knight      : 1975      MRN: [id number]  Encounter Provider: Bertha Mchugh MD  Encounter Date: 2025   Encounter department: [facility]  :  Assessment & Plan  Folliculitis    Orders:    clindamycin (CLEOCIN T) 1 % lotion; Apply topically 2 (two) times a day for 5 days           History of Present Illness   Rash  This is a new problem. The current episode started 1 to 4 weeks ago. The problem is unchanged. The affected locations include the chest. The rash is characterized by itchiness. She was exposed to nothing. Pertinent negatives include no anorexia, congestion, cough, diarrhea, facial edema, fatigue, fever, joint pain, rhinorrhea, shortness of breath, sore throat or vomiting.     Patient is here for an acute visit complaining of rash in between her breasts that started few weeks, is slightly pruritic.  Denies any fevers and chills.  No recent changes in toiletry or detergents.  Review of Systems   Constitutional:  Negative for fatigue and fever.   HENT:  Negative for congestion, rhinorrhea and sore throat.    Eyes:  Negative for pain.   Respiratory:  Negative for cough and shortness of breath.    Gastrointestinal:  Negative for anorexia, diarrhea and vomiting.   Musculoskeletal:  Negative for joint pain.   Skin:  Positive for rash.       Objective   /90 (BP Location: Left arm, Patient Position: Sitting, Cuff Size: Standard)   Pulse 88   Temp 98.3 °F (36.8 °C) (Temporal)   Ht 5' 6\" (1.676 m)   Wt 73.9 kg (163 lb)   SpO2 98%   BMI 26.31 kg/m²      Physical Exam  Skin:     Comments: Few follicular spots in between her breasts         "

## 2025-03-27 ENCOUNTER — OFFICE VISIT (OUTPATIENT)
Dept: GYNECOLOGY | Facility: CLINIC | Age: 50
End: 2025-03-27
Payer: COMMERCIAL

## 2025-03-27 VITALS
HEIGHT: 66 IN | WEIGHT: 154 LBS | DIASTOLIC BLOOD PRESSURE: 76 MMHG | SYSTOLIC BLOOD PRESSURE: 128 MMHG | BODY MASS INDEX: 24.75 KG/M2

## 2025-03-27 DIAGNOSIS — Z12.31 ENCOUNTER FOR SCREENING MAMMOGRAM FOR BREAST CANCER: ICD-10-CM

## 2025-03-27 DIAGNOSIS — Z01.419 ENCOUNTER FOR ANNUAL ROUTINE GYNECOLOGICAL EXAMINATION: Primary | ICD-10-CM

## 2025-03-27 DIAGNOSIS — N95.0 POST-MENOPAUSAL BLEEDING: ICD-10-CM

## 2025-03-27 PROCEDURE — S0612 ANNUAL GYNECOLOGICAL EXAMINA: HCPCS | Performed by: OBSTETRICS & GYNECOLOGY

## 2025-03-27 NOTE — PROGRESS NOTES
Name: Julee Knight      : 1975      MRN: [id number]  Encounter Provider: Nanette Mcdowell DO  Encounter Date: 3/27/2025   Encounter department: [facility]  :  Assessment & Plan  Encounter for annual routine gynecological examination         Encounter for screening mammogram for breast cancer    Orders:    Mammo screening bilateral w 3d and cad; Future    Post-menopausal bleeding         pap is up to date    mammogram reviewed with her including breast density.  RX given and she will schedule as she is due.  She will have the ABUS as scheduled  Discussed self breast exams    colon cancer screening - colonoscopy in -, recall in 10 years    Bleeding on HRT-stressed the importance of estrogen and progesterone together.  Discussed the risk of endometrial cancer with unopposed estrogen.  Will schedule sonohysterogram as it has been more than 6 months since she started HRT and she is having bleeding.  Most likely related to the dosing but will do the ultrasound and endometrial biopsy.  Will await the results of the sonohysterogram and biopsy and determine management.  I will let Dr. Jolly Aguillon know when I have the results as well.    discussed preventive care, regular exercise and a healthy diet      History of Present Illness   HPI  Julee Knight is a 49 y.o. female who presents for yearly.  She is new to us.  She went through menopause at around age 48.  She is on oral estradiol 1mg and progesterone 200mg.  This was started a year ago in February.  She had no bleeding until December.  Dr. Jolly Aguillon recommended changing the dose, for she increase the progesterone and decrease the estrogen.  The bleeding stopped and then restarted.  1 month ago, she stopped the progesterone but continue taking the estrogen.  She is not bleeding currently.  The bleeding has not been heavy, bright red in color only requiring a liner.  She has had no pain    No history of pregnancy    Normal 3D  mammogram in  with dense tissue and intermediate risk.  Then had right breast pain and had normal dx mammogram and ultrasound in May.  ABUS ordered and is scheduled for May  Normal pap, negative HPV in 3-2024  Normal paps        Review of Systems   Constitutional: Negative.    Gastrointestinal: Negative.    Genitourinary: Negative.           Objective   There were no vitals taken for this visit.     Physical Exam  Vitals and nursing note reviewed. Exam conducted with a chaperone present.   Constitutional:       Appearance: She is well-developed.   HENT:      Head: Normocephalic and atraumatic.   Neck:      Thyroid: No thyromegaly.   Cardiovascular:      Rate and Rhythm: Normal rate and regular rhythm.   Pulmonary:      Effort: Pulmonary effort is normal.      Breath sounds: Normal breath sounds.   Chest:   Breasts:     Right: Normal.      Left: Normal.      Comments: Examined seated and supine  Abdominal:      Palpations: Abdomen is soft.   Genitourinary:     General: Normal vulva.      Vagina: Normal.      Cervix: Normal.      Uterus: Normal.       Adnexa: Right adnexa normal and left adnexa normal.      Rectum: Normal.   Musculoskeletal:      Cervical back: Neck supple.   Skin:     General: Skin is warm and dry.   Neurological:      Mental Status: She is alert.   Psychiatric:         Mood and Affect: Mood normal.

## 2025-04-01 NOTE — PROGRESS NOTES
AMB US Pelvic Non OB    Date/Time: 4/3/2025 10:30 AM    Performed by: Magnolia Foote  Authorized by: Nanette Mcdowell DO  Universal Protocol:  Consent: Verbal consent obtained.  Consent given by: patient  Timeout called at: 4/3/2025 10:30 AM.  Patient understanding: patient states understanding of the procedure being performed  Patient identity confirmed: verbally with patient    Procedure details:     SIS Procedure: Yes    Indications: non-obstetric vaginal bleeding      Technique:  Transvaginal US, Non-OB    Position: lithotomy exam    Uterine findings:     Length (cm): 6.88    Height (cm):  3.11    Width (cm):  4.41    Endometrial stripe: identified      Endometrium thickness (mm):  4.1  Left ovary findings:     Left ovary:  Visualized    Length (cm): 2.42    Height (cm): 1.23    Width (cm): 2.46  Right ovary findings:     Right ovary:  Visualized    Length (cm): 2.01    Height (cm): 1.26    Width (cm): 1.61  Other findings:     Free pelvic fluid: not identified      Free peritoneal fluid: not identified    Post-Procedure Details:     Impression:  Anteverted uterus and bilateral ovaries appear within normal limits. There is a very small area within the upper cervical canal with echogenic debris, which is likely hemolyzed blood. No free fluid.    Tolerance:  Tolerated well, no immediate complications    Complications: no complications    Additional Procedure Comments:      VBI Vaccines F8 E8C-[id number] transvaginal transducer Serial # [id number] was used to perform the examination today and subsequently followed with high level disinfection utilizing Trophon EPR procedure.     Ultrasound performed at:     [facility]  [facility] [address] [address]  Phone:  [phone]  Fax:  [phone]    Sonohysterogram    Date/Time: 4/3/2025 10:30 AM    Performed by: Nanette Mcdowell DO  Authorized by: Nanette Mcdowell DO  Universal Protocol:  Consent: Verbal consent obtained.  Consent given by: patient  Timeout called at:  4/3/2025 10:30 AM.  Patient understanding: patient states understanding of the procedure being performed  Patient identity confirmed: verbally with patient    Procedure:     Cervix cleaned and prepped: yes      Tenaculum applied to cervix: yes      Uterus sounded: yes      Catheter inserted: yes      Uterine cavity distended with saline: yes    Post-procedure:     Patient observed: yes      Post procedure instructions given to patient: yes      Patient tolerated procedure well with no complications: yes    Comments:      Sonohysterogram demonstrates a polyp within the endometrium.  Endometrial biopsy    Date/Time: 4/3/2025 10:30 AM    Performed by: Nanette Mcdowell DO  Authorized by: Nanette Mcdowell DO  Universal Protocol:  Consent: Verbal consent obtained.  Consent given by: patient  Timeout called at: 4/3/2025 10:30 AM.  Patient understanding: patient states understanding of the procedure being performed  Patient identity confirmed: verbally with patient    Procedure:     Procedure: endometrial biopsy with Pipelle      A bivalve speculum was placed in the vagina: yes      Cervix cleaned and prepped: yes      Specimen collected: specimen collected and sent to pathology      Patient tolerated procedure well with no complications: yes

## 2025-04-02 NOTE — H&P (VIEW-ONLY)
Name: Julee Knight      : 1975      MRN: [id number]  Encounter Provider: Nanette Mcdowell DO  Encounter Date: 4/3/2025   Encounter department: [facility]  :  Assessment & Plan  Endometrial polyp         Post-menopausal bleeding         Endometrial polyp-she has had 1 before and had a D&C.  We discussed removal of this as it may be the cause of her bleeding.  EMB done today and we will await results.    D&C, hysteroscopy reviewed with pt in detail.  Risks reviewed including bleeding, infection, 1% risk of uterine perforation with rare risk of injury to surrounding structures and need for further surgery.  Post op course reviewed in detail as well including activity restrictions.  Pt's questions were answered.  E consent reviewed and signed.  Pt to schedule.        History of Present Illness   HPI  Julee Knight is a 49 y.o. female who presents for SIS.  Last February, she was started on estradiol 1mg and progesterone 200mg.  In December, she started having bleeding.  Her dose was adjusted and the bleeding stopped but then started again.  It was not heavy. 1 month ago, she stopped the progesterone but continued the estrogen.  She has not been bleeding.  I recommended SIS and emb.    Sonohysterogram shows a filling defect in the lower uterine segment.  Endometrium was 4.1 mm.  Both ovaries were normal.  Uterus was normal almost 7 cm in length.      Review of Systems   Constitutional: Negative.    Gastrointestinal: Negative.    Genitourinary: Negative.           Objective   There were no vitals taken for this visit.     Physical Exam  Genitourinary:     General: Normal vulva.      Vagina: Normal.      Cervix: Normal.      Uterus: Normal.       Adnexa: Right adnexa normal and left adnexa normal.      Comments: Uterus sounded to 6 cm

## 2025-04-02 NOTE — PROGRESS NOTES
Name: Julee Knight      : 1975      MRN: [id number]  Encounter Provider: Nanette Mcdowell DO  Encounter Date: 4/3/2025   Encounter department: [facility]  :  Assessment & Plan  Endometrial polyp         Post-menopausal bleeding         Endometrial polyp-she has had 1 before and had a D&C.  We discussed removal of this as it may be the cause of her bleeding.  EMB done today and we will await results.    D&C, hysteroscopy reviewed with pt in detail.  Risks reviewed including bleeding, infection, 1% risk of uterine perforation with rare risk of injury to surrounding structures and need for further surgery.  Post op course reviewed in detail as well including activity restrictions.  Pt's questions were answered.  E consent reviewed and signed.  Pt to schedule.        History of Present Illness   HPI  Julee Knight is a 49 y.o. female who presents for SIS.  Last February, she was started on estradiol 1mg and progesterone 200mg.  In December, she started having bleeding.  Her dose was adjusted and the bleeding stopped but then started again.  It was not heavy. 1 month ago, she stopped the progesterone but continued the estrogen.  She has not been bleeding.  I recommended SIS and emb.    Sonohysterogram shows a filling defect in the lower uterine segment.  Endometrium was 4.1 mm.  Both ovaries were normal.  Uterus was normal almost 7 cm in length.      Review of Systems   Constitutional: Negative.    Gastrointestinal: Negative.    Genitourinary: Negative.           Objective   There were no vitals taken for this visit.     Physical Exam  Genitourinary:     General: Normal vulva.      Vagina: Normal.      Cervix: Normal.      Uterus: Normal.       Adnexa: Right adnexa normal and left adnexa normal.      Comments: Uterus sounded to 6 cm

## 2025-04-03 ENCOUNTER — ULTRASOUND (OUTPATIENT)
Dept: GYNECOLOGY | Facility: CLINIC | Age: 50
End: 2025-04-03
Payer: COMMERCIAL

## 2025-04-03 ENCOUNTER — PROCEDURE VISIT (OUTPATIENT)
Dept: GYNECOLOGY | Facility: CLINIC | Age: 50
End: 2025-04-03
Payer: COMMERCIAL

## 2025-04-03 DIAGNOSIS — N95.0 PMB (POSTMENOPAUSAL BLEEDING): ICD-10-CM

## 2025-04-03 DIAGNOSIS — N84.0 ENDOMETRIAL POLYP: Primary | ICD-10-CM

## 2025-04-03 DIAGNOSIS — N93.9 ABNORMAL UTERINE BLEEDING (AUB): ICD-10-CM

## 2025-04-03 DIAGNOSIS — N95.0 POST-MENOPAUSAL BLEEDING: ICD-10-CM

## 2025-04-03 PROCEDURE — 76831 ECHO EXAM UTERUS: CPT | Performed by: OBSTETRICS & GYNECOLOGY

## 2025-04-03 PROCEDURE — 88305 TISSUE EXAM BY PATHOLOGIST: CPT | Performed by: PATHOLOGY

## 2025-04-03 PROCEDURE — 58100 BIOPSY OF UTERUS LINING: CPT | Performed by: OBSTETRICS & GYNECOLOGY

## 2025-04-03 PROCEDURE — 58340 CATHETER FOR HYSTEROGRAPHY: CPT | Performed by: OBSTETRICS & GYNECOLOGY

## 2025-04-03 PROCEDURE — 88342 IMHCHEM/IMCYTCHM 1ST ANTB: CPT | Performed by: PATHOLOGY

## 2025-04-03 PROCEDURE — 99213 OFFICE O/P EST LOW 20 MIN: CPT | Performed by: OBSTETRICS & GYNECOLOGY

## 2025-04-07 ENCOUNTER — TELEPHONE (OUTPATIENT)
Dept: OBGYN CLINIC | Facility: MEDICAL CENTER | Age: 50
End: 2025-04-07

## 2025-04-07 NOTE — TELEPHONE ENCOUNTER
----- Message from Nanette Mcdowell DO sent at 4/3/2025  5:01 PM EDT -----  [facility] GYN Department  Surgery Scheduling Sheet    Patient Name: Julee Knight  : 1975    Provider: Nanette Mcdowell DO     Needed: no; Language: N/A    Procedure: exam under anesthesia, dilation and curettage , and hysteroscopy    Diagnosis: Endometrial polyp, postmenopausal bleeding    Special Needs or Equipment: Symphion    Anesthesia: IV sedation with anesthesia, paracervical block    Length of stay: outpatient  Does patient have comorbid conditions that will require close perioperative monitoring prior to safe discharge: no    The patient has comorbid conditions that will require close perioperative monitoring prior to safe discharge, including N/A.   This may require acute care beyond the usual and routine recovery period. As such, inpatient admission post-operatively is expected and appropriate, and anticipated hospital length of stay will be >2 midnights.    Pre-Admission Testing Needed: yes   Labs that should be ordered: cbc and BMP    Order PAT that is recommended in prep for procedure?: Yes    Medical Clearance Needed: no; Provider: N/A    MA Form Signed (tubals/hysterectomy): Not Indicated    Surgical Drink Given: no     How many days out of work: 1 day(s)     How many days no drivin day(s)       Is pre op appt needed?  no  Interval for post op appt: 2 week(s)       For Surgical Scheduler:     Surgery Scheduled On:  [place]: [facility]    Pre-op Appt:   Post op Appt:  Consult/Medical clearance appt:

## 2025-04-08 ENCOUNTER — TELEPHONE (OUTPATIENT)
Dept: GYNECOLOGY | Facility: CLINIC | Age: 50
End: 2025-04-08

## 2025-04-08 ENCOUNTER — RESULTS FOLLOW-UP (OUTPATIENT)
Dept: GYNECOLOGY | Facility: CLINIC | Age: 50
End: 2025-04-08

## 2025-04-08 DIAGNOSIS — N95.1 MENOPAUSAL SYMPTOMS: ICD-10-CM

## 2025-04-08 PROCEDURE — 88305 TISSUE EXAM BY PATHOLOGIST: CPT | Performed by: PATHOLOGY

## 2025-04-08 PROCEDURE — 88342 IMHCHEM/IMCYTCHM 1ST ANTB: CPT | Performed by: PATHOLOGY

## 2025-04-08 RX ORDER — PROGESTERONE 200 MG/1
200 CAPSULE ORAL
Qty: 30 CAPSULE | Refills: 3 | Status: SHIPPED | OUTPATIENT
Start: 2025-04-08

## 2025-04-14 DIAGNOSIS — N95.1 MENOPAUSAL VASOMOTOR SYNDROME: ICD-10-CM

## 2025-04-15 RX ORDER — ESTRADIOL 1 MG/1
1 TABLET ORAL DAILY
Qty: 30 TABLET | Refills: 1 | Status: SHIPPED | OUTPATIENT
Start: 2025-04-15

## 2025-04-16 ENCOUNTER — APPOINTMENT (OUTPATIENT)
Dept: LAB | Facility: CLINIC | Age: 50
End: 2025-04-16
Attending: PREVENTIVE MEDICINE
Payer: COMMERCIAL

## 2025-04-16 ENCOUNTER — APPOINTMENT (OUTPATIENT)
Dept: LAB | Facility: CLINIC | Age: 50
End: 2025-04-16
Payer: COMMERCIAL

## 2025-04-16 ENCOUNTER — TRANSCRIBE ORDERS (OUTPATIENT)
Dept: LAB | Facility: CLINIC | Age: 50
End: 2025-04-16

## 2025-04-16 ENCOUNTER — RESULTS FOLLOW-UP (OUTPATIENT)
Age: 50
End: 2025-04-16

## 2025-04-16 DIAGNOSIS — Z00.8 HEALTH EXAMINATION IN POPULATION SURVEY: Primary | ICD-10-CM

## 2025-04-16 DIAGNOSIS — Z01.818 PRE-OP TESTING: ICD-10-CM

## 2025-04-16 DIAGNOSIS — Z00.8 HEALTH EXAMINATION IN POPULATION SURVEY: ICD-10-CM

## 2025-04-16 LAB
ANION GAP SERPL CALCULATED.3IONS-SCNC: 10 MMOL/L (ref 4–13)
BUN SERPL-MCNC: 16 MG/DL (ref 5–25)
CALCIUM SERPL-MCNC: 9.2 MG/DL (ref 8.4–10.2)
CHLORIDE SERPL-SCNC: 100 MMOL/L (ref 96–108)
CHOLEST SERPL-MCNC: 236 MG/DL (ref ?–200)
CO2 SERPL-SCNC: 29 MMOL/L (ref 21–32)
CREAT SERPL-MCNC: 0.83 MG/DL (ref 0.6–1.3)
ERYTHROCYTE [DISTWIDTH] IN BLOOD BY AUTOMATED COUNT: 12.9 % (ref 11.6–15.1)
EST. AVERAGE GLUCOSE BLD GHB EST-MCNC: 117 MG/DL
GFR SERPL CREATININE-BSD FRML MDRD: 81 ML/MIN/1.73SQ M
GLUCOSE P FAST SERPL-MCNC: 91 MG/DL (ref 65–99)
HBA1C MFR BLD: 5.7 %
HCT VFR BLD AUTO: 44 % (ref 34.8–46.1)
HDLC SERPL-MCNC: 70 MG/DL
HGB BLD-MCNC: 14.2 G/DL (ref 11.5–15.4)
LDLC SERPL CALC-MCNC: 139 MG/DL (ref 0–100)
MCH RBC QN AUTO: 29.3 PG (ref 26.8–34.3)
MCHC RBC AUTO-ENTMCNC: 32.3 G/DL (ref 31.4–37.4)
MCV RBC AUTO: 91 FL (ref 82–98)
NONHDLC SERPL-MCNC: 166 MG/DL
POTASSIUM SERPL-SCNC: 4.2 MMOL/L (ref 3.5–5.3)
RBC # BLD AUTO: 4.85 MILLION/UL (ref 3.81–5.12)
SODIUM SERPL-SCNC: 139 MMOL/L (ref 135–147)
TRIGL SERPL-MCNC: 135 MG/DL (ref ?–150)
WBC # BLD AUTO: 7.32 THOUSAND/UL (ref 4.31–10.16)

## 2025-04-16 PROCEDURE — 80048 BASIC METABOLIC PNL TOTAL CA: CPT

## 2025-04-16 PROCEDURE — 85027 COMPLETE CBC AUTOMATED: CPT

## 2025-04-16 PROCEDURE — 83036 HEMOGLOBIN GLYCOSYLATED A1C: CPT

## 2025-04-16 PROCEDURE — 36415 COLL VENOUS BLD VENIPUNCTURE: CPT

## 2025-04-18 NOTE — PRE-PROCEDURE INSTRUCTIONS
Pre-Surgery Instructions:   Medication Instructions    ascorbic acid (VITAMIN C) 500 mg tablet Stop taking 7 days prior to surgery.    Calcium Carbonate 1500 (600 Ca) MG TABS Stop taking 7 days prior to surgery.    Cholecalciferol (VITAMIN D3) 1000 units CAPS Stop taking 7 days prior to surgery.    estradiol (Estrace) 1 mg tablet Hold day of surgery. Will take once home after surgery.     Progesterone 200 MG CAPS Take night before surgery   Medication instructions for day of surgery reviewed. Please take all instructed medications with only a sip of water.       You will receive a call one business day prior to surgery with an arrival time and hospital directions. If your surgery is scheduled on a Monday, the hospital will be calling you on the Friday prior to your surgery. If you have not heard from anyone by 8pm, please call the hospital supervisor through the hospital  at [phone]. ([place] [phone] or [place] [phone]).    Do not eat or drink anything after midnight the night before your surgery, including candy, mints, lifesavers, or chewing gum. Do not drink alcohol 24hrs before your surgery. Try not to smoke at least 24hrs before your surgery.       Follow the pre surgery showering instructions as listed in the “My Surgical Experience Booklet” or otherwise provided by your surgeon's office. Do not use a blade to shave the surgical area 1 week before surgery. It is okay to use a clean electric clippers up to 24 hours before surgery. Do not apply any lotions, creams, including makeup, cologne, deodorant, or perfumes after showering on the day of your surgery. Do not use dry shampoo, hair spray, hair gel, or any type of hair products.     No contact lenses, eye make-up, or artificial eyelashes. Remove nail polish, including gel polish, and any artificial, gel, or acrylic nails if possible. Remove all jewelry including rings and body piercing jewelry.     Wear causal clothing that is easy to  take on and off. Consider your type of surgery.    Keep any valuables, jewelry, piercings at home. Please bring any specially ordered equipment (sling, braces) if indicated.    Arrange for a responsible person to drive you to and from the hospital on the day of your surgery. Please confirm the visitor policy for the day of your procedure when you receive your phone call with an arrival time.     Call the surgeon's office with any new illnesses, exposures, or additional questions prior to surgery.    Please reference your “My Surgical Experience Booklet” for additional information to prepare for your upcoming surgery.

## 2025-04-27 ENCOUNTER — ANESTHESIA EVENT (OUTPATIENT)
Dept: PERIOP | Facility: HOSPITAL | Age: 50
End: 2025-04-27
Payer: COMMERCIAL

## 2025-04-29 ENCOUNTER — ANESTHESIA (OUTPATIENT)
Dept: PERIOP | Facility: HOSPITAL | Age: 50
End: 2025-04-29
Payer: COMMERCIAL

## 2025-04-29 ENCOUNTER — HOSPITAL ENCOUNTER (OUTPATIENT)
Facility: HOSPITAL | Age: 50
Setting detail: OUTPATIENT SURGERY
Discharge: HOME | End: 2025-04-29
Attending: OBSTETRICS & GYNECOLOGY | Admitting: OBSTETRICS & GYNECOLOGY
Payer: COMMERCIAL

## 2025-04-29 VITALS
WEIGHT: 155.42 LBS | HEART RATE: 66 BPM | SYSTOLIC BLOOD PRESSURE: 129 MMHG | OXYGEN SATURATION: 99 % | RESPIRATION RATE: 16 BRPM | DIASTOLIC BLOOD PRESSURE: 72 MMHG | TEMPERATURE: 98.1 F | BODY MASS INDEX: 25.09 KG/M2

## 2025-04-29 DIAGNOSIS — G89.18 POSTOPERATIVE PAIN: Primary | ICD-10-CM

## 2025-04-29 DIAGNOSIS — N95.0 PMB (POSTMENOPAUSAL BLEEDING): ICD-10-CM

## 2025-04-29 DIAGNOSIS — N84.0 ENDOMETRIAL POLYP: ICD-10-CM

## 2025-04-29 PROCEDURE — 58558 HYSTEROSCOPY BIOPSY: CPT | Performed by: OBSTETRICS & GYNECOLOGY

## 2025-04-29 PROCEDURE — 88305 TISSUE EXAM BY PATHOLOGIST: CPT | Performed by: PATHOLOGY

## 2025-04-29 RX ORDER — MEPERIDINE HYDROCHLORIDE 25 MG/ML
12.5 INJECTION INTRAMUSCULAR; INTRAVENOUS; SUBCUTANEOUS
Status: DISCONTINUED | OUTPATIENT
Start: 2025-04-29 | End: 2025-04-29 | Stop reason: HOSPADM

## 2025-04-29 RX ORDER — ONDANSETRON 2 MG/ML
4 INJECTION INTRAMUSCULAR; INTRAVENOUS ONCE AS NEEDED
Status: DISCONTINUED | OUTPATIENT
Start: 2025-04-29 | End: 2025-04-29 | Stop reason: HOSPADM

## 2025-04-29 RX ORDER — GLYCOPYRROLATE 0.2 MG/ML
INJECTION INTRAMUSCULAR; INTRAVENOUS AS NEEDED
Status: DISCONTINUED | OUTPATIENT
Start: 2025-04-29 | End: 2025-04-29

## 2025-04-29 RX ORDER — PROMETHAZINE HYDROCHLORIDE 25 MG/ML
6.25 INJECTION, SOLUTION INTRAMUSCULAR; INTRAVENOUS ONCE AS NEEDED
Status: DISCONTINUED | OUTPATIENT
Start: 2025-04-29 | End: 2025-04-29 | Stop reason: HOSPADM

## 2025-04-29 RX ORDER — MIDAZOLAM HYDROCHLORIDE 2 MG/2ML
INJECTION, SOLUTION INTRAMUSCULAR; INTRAVENOUS AS NEEDED
Status: DISCONTINUED | OUTPATIENT
Start: 2025-04-29 | End: 2025-04-29

## 2025-04-29 RX ORDER — LIDOCAINE HYDROCHLORIDE 10 MG/ML
INJECTION, SOLUTION EPIDURAL; INFILTRATION; INTRACAUDAL; PERINEURAL AS NEEDED
Status: DISCONTINUED | OUTPATIENT
Start: 2025-04-29 | End: 2025-04-29 | Stop reason: HOSPADM

## 2025-04-29 RX ORDER — PROPOFOL 10 MG/ML
INJECTION, EMULSION INTRAVENOUS CONTINUOUS PRN
Status: DISCONTINUED | OUTPATIENT
Start: 2025-04-29 | End: 2025-04-29

## 2025-04-29 RX ORDER — FENTANYL CITRATE/PF 50 MCG/ML
25 SYRINGE (ML) INJECTION
Status: DISCONTINUED | OUTPATIENT
Start: 2025-04-29 | End: 2025-04-29 | Stop reason: HOSPADM

## 2025-04-29 RX ORDER — PROPOFOL 10 MG/ML
INJECTION, EMULSION INTRAVENOUS AS NEEDED
Status: DISCONTINUED | OUTPATIENT
Start: 2025-04-29 | End: 2025-04-29

## 2025-04-29 RX ORDER — MAGNESIUM HYDROXIDE 1200 MG/15ML
LIQUID ORAL AS NEEDED
Status: DISCONTINUED | OUTPATIENT
Start: 2025-04-29 | End: 2025-04-29 | Stop reason: HOSPADM

## 2025-04-29 RX ORDER — ONDANSETRON 2 MG/ML
4 INJECTION INTRAMUSCULAR; INTRAVENOUS EVERY 6 HOURS PRN
Status: DISCONTINUED | OUTPATIENT
Start: 2025-04-29 | End: 2025-04-29 | Stop reason: HOSPADM

## 2025-04-29 RX ORDER — IBUPROFEN 600 MG/1
600 TABLET, FILM COATED ORAL EVERY 6 HOURS PRN
Qty: 30 TABLET | Refills: 0 | Status: SHIPPED | OUTPATIENT
Start: 2025-04-29

## 2025-04-29 RX ORDER — ACETAMINOPHEN 325 MG/1
650 TABLET ORAL EVERY 6 HOURS PRN
Status: DISCONTINUED | OUTPATIENT
Start: 2025-04-29 | End: 2025-04-29 | Stop reason: HOSPADM

## 2025-04-29 RX ORDER — DEXAMETHASONE SODIUM PHOSPHATE 10 MG/ML
INJECTION, SOLUTION INTRAMUSCULAR; INTRAVENOUS AS NEEDED
Status: DISCONTINUED | OUTPATIENT
Start: 2025-04-29 | End: 2025-04-29

## 2025-04-29 RX ORDER — SODIUM CHLORIDE, SODIUM LACTATE, POTASSIUM CHLORIDE, CALCIUM CHLORIDE 600; 310; 30; 20 MG/100ML; MG/100ML; MG/100ML; MG/100ML
20 INJECTION, SOLUTION INTRAVENOUS CONTINUOUS
Status: DISCONTINUED | OUTPATIENT
Start: 2025-04-29 | End: 2025-04-29 | Stop reason: HOSPADM

## 2025-04-29 RX ORDER — ACETAMINOPHEN 325 MG/1
975 TABLET ORAL ONCE
Status: COMPLETED | OUTPATIENT
Start: 2025-04-29 | End: 2025-04-29

## 2025-04-29 RX ORDER — IBUPROFEN 600 MG/1
600 TABLET, FILM COATED ORAL EVERY 6 HOURS PRN
Status: DISCONTINUED | OUTPATIENT
Start: 2025-04-29 | End: 2025-04-29 | Stop reason: HOSPADM

## 2025-04-29 RX ORDER — ONDANSETRON 2 MG/ML
INJECTION INTRAMUSCULAR; INTRAVENOUS AS NEEDED
Status: DISCONTINUED | OUTPATIENT
Start: 2025-04-29 | End: 2025-04-29

## 2025-04-29 RX ORDER — KETAMINE HYDROCHLORIDE 50 MG/ML
INJECTION, SOLUTION INTRAMUSCULAR; INTRAVENOUS AS NEEDED
Status: DISCONTINUED | OUTPATIENT
Start: 2025-04-29 | End: 2025-04-29

## 2025-04-29 RX ORDER — FENTANYL CITRATE 50 UG/ML
INJECTION, SOLUTION INTRAMUSCULAR; INTRAVENOUS AS NEEDED
Status: DISCONTINUED | OUTPATIENT
Start: 2025-04-29 | End: 2025-04-29

## 2025-04-29 RX ORDER — LIDOCAINE HYDROCHLORIDE 20 MG/ML
INJECTION, SOLUTION EPIDURAL; INFILTRATION; INTRACAUDAL; PERINEURAL AS NEEDED
Status: DISCONTINUED | OUTPATIENT
Start: 2025-04-29 | End: 2025-04-29

## 2025-04-29 RX ADMIN — DEXAMETHASONE SODIUM PHOSPHATE 10 MG: 10 INJECTION, SOLUTION INTRAMUSCULAR; INTRAVENOUS at 13:49

## 2025-04-29 RX ADMIN — MIDAZOLAM 2 MG: 1 INJECTION INTRAMUSCULAR; INTRAVENOUS at 13:35

## 2025-04-29 RX ADMIN — LIDOCAINE HYDROCHLORIDE 40 MG: 20 INJECTION, SOLUTION EPIDURAL; INFILTRATION; INTRACAUDAL at 13:41

## 2025-04-29 RX ADMIN — PROPOFOL 60 MG: 10 INJECTION, EMULSION INTRAVENOUS at 13:41

## 2025-04-29 RX ADMIN — IBUPROFEN 600 MG: 600 TABLET, FILM COATED ORAL at 15:40

## 2025-04-29 RX ADMIN — GLYCOPYRROLATE 0.2 MCG: 0.2 INJECTION, SOLUTION INTRAMUSCULAR; INTRAVENOUS at 13:35

## 2025-04-29 RX ADMIN — FENTANYL CITRATE 50 MCG: 50 INJECTION INTRAMUSCULAR; INTRAVENOUS at 13:41

## 2025-04-29 RX ADMIN — ONDANSETRON 4 MG: 2 INJECTION INTRAMUSCULAR; INTRAVENOUS at 14:05

## 2025-04-29 RX ADMIN — PROPOFOL 150 MCG/KG/MIN: 10 INJECTION, EMULSION INTRAVENOUS at 13:41

## 2025-04-29 RX ADMIN — KETAMINE HYDROCHLORIDE 25 MG: 50 INJECTION INTRAMUSCULAR; INTRAVENOUS at 13:53

## 2025-04-29 RX ADMIN — SODIUM CHLORIDE, SODIUM LACTATE, POTASSIUM CHLORIDE, AND CALCIUM CHLORIDE 20 ML/HR: .6; .31; .03; .02 INJECTION, SOLUTION INTRAVENOUS at 11:46

## 2025-04-29 RX ADMIN — ACETAMINOPHEN 975 MG: 325 TABLET, FILM COATED ORAL at 11:25

## 2025-04-29 RX ADMIN — KETAMINE HYDROCHLORIDE 25 MG: 50 INJECTION INTRAMUSCULAR; INTRAVENOUS at 13:41

## 2025-04-29 RX ADMIN — SODIUM CHLORIDE, SODIUM LACTATE, POTASSIUM CHLORIDE, AND CALCIUM CHLORIDE 20 ML/HR: .6; .31; .03; .02 INJECTION, SOLUTION INTRAVENOUS at 14:50

## 2025-04-29 RX ADMIN — FENTANYL CITRATE 50 MCG: 50 INJECTION INTRAMUSCULAR; INTRAVENOUS at 14:08

## 2025-04-29 NOTE — INTERVAL H&P NOTE
H&P reviewed. After examining the patient I find no changes in the patients condition since the H&P had been written. I reviewed the procedure with the patient.  Discussed post op activity and restrictions.  Will proceed with D&C, hysteroscopy.    Vitals:    04/29/25 1127   BP: 130/74   Pulse: 84   Resp: 16   Temp: 98.1 °F (36.7 °C)   SpO2: 97%

## 2025-04-29 NOTE — ANESTHESIA POSTPROCEDURE EVALUATION
Post-Op Assessment Note    CV Status:  Stable    Pain management: adequate       Mental Status:  Awake   Hydration Status:  Stable   PONV Controlled:  Controlled   Airway Patency:  Patent     Post Op Vitals Reviewed: Yes    No anethesia notable event occurred.    Staff: Anesthesiologist           Last Filed PACU Vitals:  Vitals Value Taken Time   Temp 98.4 °F (36.9 °C) 04/29/25 1418   Pulse 76 04/29/25 1438   /80 04/29/25 1435   Resp 22 04/29/25 1440   SpO2 100 % 04/29/25 1438   Vitals shown include unfiled device data.    Modified Rylie:     Vitals Value Taken Time   Activity 2 04/29/25 1440   Respiration 2 04/29/25 1440   Circulation 2 04/29/25 1440   Consciousness 2 04/29/25 1440   Oxygen Saturation 2 04/29/25 1440     Modified Rylie Score: 10

## 2025-04-29 NOTE — ANESTHESIA POSTPROCEDURE EVALUATION
Post-Op Assessment Note    CV Status:  Stable  Pain Score: 0    Pain management: adequate       Mental Status:  Alert and awake   Hydration Status:  Euvolemic   PONV Controlled:  Controlled   Airway Patency:  Patent     Post Op Vitals Reviewed: Yes    No anethesia notable event occurred.    Staff: Anesthesiologist, CRNA   Comments: Report given to recovering RN, VSS, Pt states she is comfortable        Last Filed PACU Vitals:  Vitals Value Taken Time   Temp 98.4 °F (36.9 °C) 04/29/25 1418   Pulse 73 04/29/25 1418   /69 04/29/25 1418   Resp 16 04/29/25 1418   SpO2 97 % 04/29/25 1418

## 2025-04-29 NOTE — ANESTHESIA PREPROCEDURE EVALUATION
Procedure:  DILATATION AND CURETTAGE WITH HYSTEROSCOPY; EXAM UNDER ANESTHESIA (Uterus)    Relevant Problems   ANESTHESIA (within normal limits)      CARDIO (within normal limits)      PULMONARY (within normal limits)        Physical Exam    Airway    Mallampati score: II  TM Distance: >3 FB  Neck ROM: full     Dental   No notable dental hx     Cardiovascular  Rhythm: regular, Rate: normal, Cardiovascular exam normal    Pulmonary  Pulmonary exam normal Breath sounds clear to auscultation    Other Findings  post-pubertal.      Anesthesia Plan  ASA Score- 2     Anesthesia Type- general with ASA Monitors.         Additional Monitors:     Airway Plan: LMA.           Plan Factors-Exercise tolerance (METS): >4 METS.    Chart reviewed.   Existing labs reviewed. Patient summary reviewed.    Patient is not a current smoker.              Induction- intravenous.    Postoperative Plan-         Informed Consent- Anesthetic plan and risks discussed with patient.        NPO Status:  Vitals Value Taken Time   Date of last liquid 04/29/25 04/29/25 1126   Time of last liquid 1125 04/29/25 1126   Date of last solid 04/28/25 04/29/25 1126   Time of last solid 1800 04/29/25 1126

## 2025-04-29 NOTE — DISCHARGE INSTR - AVS FIRST PAGE
Postoperative dilation and curettage, hysteroscopy instructions    You can use Tylenol and Advil or Aleve for pain.    You can expect spotting/light bleeding and cramps intermittently for about a week.    Please call the office with worse pain or bleeding.      Please call with malodorous discharge, fever, chest pain, leg pain or shortness of breath.    Avoid strenuous exercise, heavy lifting and intercourse until all bleeding has stopped.     Please call the office with any questions or concerns.    We will call you with the pathology results.    Return to the office in 2 weeks for a postop visit.

## 2025-04-29 NOTE — OP NOTE
OPERATIVE REPORT  PATIENT NAME: Julee Knight    :  1975  MRN: [id number]  Pt Location: [facility] OR ROOM 06    SURGERY DATE: 2025    Surgeons and Role:     * Nanette Mcdowell DO - Primary    Preop Diagnosis:  PMB (postmenopausal bleeding) [N95.0]  Endometrial polyp [N84.0]    Post-Op Diagnosis Codes:     * PMB (postmenopausal bleeding) [N95.0]     * Endometrial polyp [N84.0]    Procedure(s):  DILATATION AND CURETTAGE WITH HYSTEROSCOPY; EXAM UNDER ANESTHESIA    Specimen(s):  ID Type Source Tests Collected by Time Destination   1 : EMC AND POLYP Tissue Endometrium TISSUE EXAM Nanette Mcdowell  2025 1404        Estimated Blood Loss:   Minimal    Drains:  * No LDAs found *    Anesthesia Type:   IV Sedation with Anesthesia  Paracervical block with 1% plain lidocaine, 10cc    Urine output-10 cc clear urine    Hysteroscopic deficit-200 cc    Operative Indications:  PMB (postmenopausal bleeding) [N95.0]  Endometrial polyp [N84.0]      Operative Findings:  Uterus sounded to 6-1/2 cm  Atrophic endometrium  Polyp noted at 9:00  Scant amount of endometrial tissue obtained  No adnexal masses      Complications:   None    Procedure and Technique:    Pt taken to the operating room and identified as Julee Knight.  In the dorsal lithotomy position, she was sterilely prepped and draped.  Timeout was performed.  Bimanual was performed with the above findings.  Her bladder was drained for 10 cc of clear urine.   Weighted and Dk speculums were placed in the vagina and the anterior lip of the cervix was grasped with a tenaculum.  Paracervical block was administered with 10cc of 1% lidocaine.      The uterus was then sounded and the cervix dilated to accomodate the diagnostic hysteroscope.  This was inserted into the endometrium and there was noted to be a minimal amount of tissue.  There was a smooth fundus.  There was a polyp noted at 9:00 in the lower uterine segment.  Endometrium was very atrophic.  The resecting device  for the Symphion was placed through the scope and the polyp was resected without difficulty.  The hysteroscope was removed and the uterus was currettaged iIn the usual fashion with a very small amount of tissue obtained.  The endometrium was adequately sampled.  At this time, all instruments were removed from the uterus, cervix and vagina.  The cervix was hemostatic from the tenaculum.  All sponge and instruments were correct x 2.  The patient was awakened and taken to the PACU in good condition.   I     I was present for the entire procedure.    Patient Disposition:  PACU              SIGNATURE: Nanette Mcdowell DO  DATE: April 29, 2025  TIME: 2:17 PM

## 2025-05-05 ENCOUNTER — RESULTS FOLLOW-UP (OUTPATIENT)
Dept: GYNECOLOGY | Facility: CLINIC | Age: 50
End: 2025-05-05

## 2025-05-05 PROCEDURE — 88305 TISSUE EXAM BY PATHOLOGIST: CPT | Performed by: PATHOLOGY

## 2025-05-06 ENCOUNTER — OFFICE VISIT (OUTPATIENT)
Age: 50
End: 2025-05-06
Payer: COMMERCIAL

## 2025-05-06 VITALS
DIASTOLIC BLOOD PRESSURE: 78 MMHG | TEMPERATURE: 98.2 F | HEIGHT: 66 IN | WEIGHT: 157 LBS | OXYGEN SATURATION: 99 % | SYSTOLIC BLOOD PRESSURE: 110 MMHG | BODY MASS INDEX: 25.23 KG/M2 | HEART RATE: 69 BPM

## 2025-05-06 DIAGNOSIS — Z12.83 SKIN CANCER SCREENING: ICD-10-CM

## 2025-05-06 DIAGNOSIS — E78.49 OTHER HYPERLIPIDEMIA: ICD-10-CM

## 2025-05-06 DIAGNOSIS — Z23 ENCOUNTER FOR IMMUNIZATION: ICD-10-CM

## 2025-05-06 DIAGNOSIS — Z00.00 ANNUAL PHYSICAL EXAM: Primary | ICD-10-CM

## 2025-05-06 DIAGNOSIS — Z82.49 FAMILY HISTORY OF CORONARY ARTERY DISEASE: ICD-10-CM

## 2025-05-06 DIAGNOSIS — R41.3 MEMORY CHANGES: ICD-10-CM

## 2025-05-06 PROCEDURE — 90471 IMMUNIZATION ADMIN: CPT

## 2025-05-06 PROCEDURE — 99396 PREV VISIT EST AGE 40-64: CPT | Performed by: INTERNAL MEDICINE

## 2025-05-06 PROCEDURE — 90677 PCV20 VACCINE IM: CPT

## 2025-05-06 NOTE — PROGRESS NOTES
Adult Annual Physical  Name: Julee Knight      : 1975      MRN: [id number]  Encounter Provider: Bertha Mchugh MD  Encounter Date: 2025   Encounter department: [facility]    :  Assessment & Plan  Annual physical exam  Mammogram is scheduled.  Colonoscopy up-to-date.  Patient given Prevnar 20.       Memory changes  Brain stimulating exercises recommended.  Keeping a diary to keep track of appointments and reminders will be helpful.  Daily fast walking will also be useful.    Orders:    MRI brain NeuroQuant wo contrast; Future    Other hyperlipidemia  Diet modification repeat blood work in 6 months  Orders:    CT coronary calcium score; Future    Lipid panel; Future    Family history of coronary artery disease    Orders:    CT coronary calcium score; Future    Encounter for immunization    Orders:    Pneumococcal Conjugate Vaccine 20-valent (Pcv20)    Skin cancer screening    Orders:    Ambulatory Referral to Dermatology; Future        Preventive Screenings:    - Cervical cancer screening: screening up-to-date   - Breast cancer screening: screening up-to-date     Immunizations:  - Immunizations due: Zoster (Shingrix)      Depression Screening and Follow-up Plan: Patient was screened for depression during today's encounter. They screened negative with a PHQ-2 score of 0.        Patient is here for annual physical.  Recent lab studies were discussed.  Cholesterol is elevated.  LDL is better than last time but still on the higher end of normal.  Patient has family history of coronary disease though later in life.  CT CAC would be ordered.  She was also noted to slightly elevated hemoglobin A1c was fasting glucose normal.  Encourage complex, hydrates small portion size.  Also, patient has been having memory changes difficulty with keeping track of missing reminders.  On asking she does mention she is taking care of her aging parents with complex medical issues as well as her aunt.  She  is a full-time job.  She reports no headache focal weakness.  She denies depression.  She has not noticed deficit in any other.  Of the exact infections other than the memory.  She is able to answer all questions appropriately.  Negative family history of Alzheimer's but possible vascular dementia later in life.  Neuro quant MRI would be ordered.  History of Present Illness     Adult Annual Physical:  Patient presents for annual physical.     Diet and Physical Activity:  - Diet/Nutrition: well balanced diet.  - Exercise: walking.    Depression Screening:  - PHQ-2 Score: 0    General Health:  - Sleep: 4-6 hours of sleep on average.  - Hearing: normal hearing bilateral ears.  - Vision: most recent eye exam < 1 year ago and wears glasses and contacts.  - Dental: regular dental visits, brushes teeth three times daily and floss regularly.    /GYN Health:  - Follows with GYN: yes.   - Menopause: postmenopausal.   - History of STDs: no  - Contraception: menopause.      Advanced Care Planning:  - Has an advanced directive?: no    - Has a durable medical POA?: no      Review of Systems   Constitutional:  Negative for chills and fever.   Respiratory:  Negative for cough and shortness of breath.    Cardiovascular:  Negative for chest pain, palpitations and leg swelling.   Gastrointestinal:  Negative for abdominal pain, blood in stool, constipation and diarrhea.   Neurological:  Negative for dizziness.        See above   Psychiatric/Behavioral:  Negative for decreased concentration, dysphoric mood and sleep disturbance.      Past Medical History   Past Medical History:   Diagnosis Date    Allergic rhinitis 11/2024    Back pain     Ear problems 11/2024    TMJ dysfunction 12/2024    Varicella      Past Surgical History:   Procedure Laterality Date    COLONOSCOPY      DILATION AND CURETTAGE OF UTERUS      [place] HYSTEROSCOPY REMOVAL LEIOMYOMATA N/A 4/29/2025    Procedure: DILATATION AND CURETTAGE WITH HYSTEROSCOPY; EXAM UNDER  ANESTHESIA;  Surgeon: Nanette Mcdowell DO;  Location: [facility];  Service: Gynecology     Family History   Problem Relation Age of Onset    Heart disease Mother         AFib    Prostate cancer Father 70    Cancer Father         Prostate    Skin cancer Father     Prostate cancer Brother     No Known Problems Maternal Aunt     Melanoma Paternal Aunt     No Known Problems Maternal Grandmother     No Known Problems Maternal Grandfather     Cancer Paternal Grandmother         Pancreatic Cancer    Colon cancer Paternal Grandfather     Cancer Brother         Prostate Cancer    BRCA2 Negative Neg Hx     BRCA2 Positive Neg Hx     BRCA1 Positive Neg Hx     BRCA1 Negative Neg Hx     BRCA 1/2 Neg Hx     Ovarian cancer Neg Hx     Endometrial cancer Neg Hx     Breast cancer additional onset Neg Hx     Breast cancer Neg Hx       reports that she has never smoked. She has never used smokeless tobacco. She reports current alcohol use of about 2.0 standard drinks of alcohol per week. She reports that she does not use drugs.  Current Outpatient Medications   Medication Instructions    ascorbic acid (VITAMIN C) 500 mg, Daily    Calcium Carbonate 1500 (600 Ca) MG TABS Take by mouth    Cholecalciferol (VITAMIN D3) 1000 units CAPS Take by mouth    estradiol (ESTRACE) 1 mg, Oral, Daily    ibuprofen (MOTRIN) 600 mg, Oral, Every 6 hours PRN    Progesterone 200 mg, Oral, SL MYCHART Nightly   No Known Allergies   Current Outpatient Medications on File Prior to Visit   Medication Sig Dispense Refill    ascorbic acid (VITAMIN C) 500 mg tablet Take 500 mg by mouth daily      Calcium Carbonate 1500 (600 Ca) MG TABS Take by mouth      Cholecalciferol (VITAMIN D3) 1000 units CAPS Take by mouth      estradiol (Estrace) 1 mg tablet Take 1 tablet (1 mg total) by mouth daily 30 tablet 1    ibuprofen (MOTRIN) 600 mg tablet Take 1 tablet (600 mg total) by mouth every 6 (six) hours as needed for mild pain 30 tablet 0    Progesterone 200 MG CAPS Take 200 mg by  "mouth at bedtime 30 capsule 3     No current facility-administered medications on file prior to visit.      Social History     Tobacco Use    Smoking status: Never    Smokeless tobacco: Never   Vaping Use    Vaping status: Never Used   Substance and Sexual Activity    Alcohol use: Yes     Alcohol/week: 2.0 standard drinks of alcohol     Types: 2 Glasses of wine per week    Drug use: Never    Sexual activity: Not Currently     Partners: Male     Birth control/protection: Abstinence, Condom Male       Objective   /78 (BP Location: Left arm, Patient Position: Sitting, Cuff Size: Large)   Pulse 69   Temp 98.2 °F (36.8 °C) (Temporal)   Ht 5' 6\" (1.676 m)   Wt 71.2 kg (157 lb)   SpO2 99%   BMI 25.34 kg/m²     Physical Exam  Vitals and nursing note reviewed.   Constitutional:       General: She is not in acute distress.     Appearance: She is well-developed.   HENT:      Head: Normocephalic and atraumatic.   Eyes:      Conjunctiva/sclera: Conjunctivae normal.   Neck:      Vascular: No carotid bruit.   Cardiovascular:      Rate and Rhythm: Normal rate and regular rhythm.      Heart sounds: No murmur heard.  Pulmonary:      Effort: Pulmonary effort is normal. No respiratory distress.      Breath sounds: Normal breath sounds. No wheezing or rales.   Abdominal:      General: There is no distension.      Palpations: Abdomen is soft. There is no mass.      Tenderness: There is no abdominal tenderness. There is no guarding.   Musculoskeletal:      Cervical back: Neck supple.      Right lower leg: No edema.      Left lower leg: No edema.   Lymphadenopathy:      Cervical: No cervical adenopathy.      Upper Body:      Right upper body: No supraclavicular or axillary adenopathy.      Left upper body: No supraclavicular or axillary adenopathy.   Skin:     General: Skin is warm and dry.      Capillary Refill: Capillary refill takes less than 2 seconds.   Neurological:      Mental Status: She is alert.   Psychiatric:         " Mood and Affect: Mood normal.         Behavior: Behavior normal.

## 2025-05-06 NOTE — PATIENT INSTRUCTIONS
"Patient Education     Routine physical for adults   The Basics   Written by the doctors and editors at [facility]   What is a physical? -- A physical is a routine visit, or \"check-up,\" with your doctor. You might also hear it called a \"wellness visit\" or \"preventive visit.\"  During each visit, the doctor will:   Ask about your physical and mental health   Ask about your habits, behaviors, and lifestyle   Do an exam   Give you vaccines if needed   Talk to you about any medicines you take   Give advice about your health   Answer your questions  Getting regular check-ups is an important part of taking care of your health. It can help your doctor find and treat any problems you have. But it's also important for preventing health problems.  A routine physical is different from a \"sick visit.\" A sick visit is when you see a doctor because of a health concern or problem. Since physicals are scheduled ahead of time, you can think about what you want to ask the doctor.  How often should I get a physical? -- It depends on your age and health. In general, for people age 21 years and older:   If you are younger than 50 years, you might be able to get a physical every 3 years.   If you are 50 years or older, your doctor might recommend a physical every year.  If you have an ongoing health condition, like diabetes or high blood pressure, your doctor will probably want to see you more often.  What happens during a physical? -- In general, each visit will include:   Physical exam - The doctor or nurse will check your height, weight, heart rate, and blood pressure. They will also look at your eyes and ears. They will ask about how you are feeling and whether you have any symptoms that bother you.   Medicines - It's a good idea to bring a list of all the medicines you take to each doctor visit. Your doctor will talk to you about your medicines and answer any questions. Tell them if you are having any side effects that bother you. You " "should also tell them if you are having trouble paying for any of your medicines.   Habits and behaviors - This includes:   Your diet   Your exercise habits   Whether you smoke, drink alcohol, or use drugs   Whether you are sexually active   Whether you feel safe at home  Your doctor will talk to you about things you can do to improve your health and lower your risk of health problems. They will also offer help and support. For example, if you want to quit smoking, they can give you advice and might prescribe medicines. If you want to improve your diet or get more physical activity, they can help you with this, too.   Lab tests, if needed - The tests you get will depend on your age and situation. For example, your doctor might want to check your:   Cholesterol   Blood sugar   Iron level   Vaccines - The recommended vaccines will depend on your age, health, and what vaccines you already had. Vaccines are very important because they can prevent certain serious or deadly infections.   Discussion of screening - \"Screening\" means checking for diseases or other health problems before they cause symptoms. Your doctor can recommend screening based on your age, risk, and preferences. This might include tests to check for:   Cancer, such as breast, prostate, cervical, ovarian, colorectal, prostate, lung, or skin cancer   Sexually transmitted infections, such as chlamydia and gonorrhea   Mental health conditions like depression and anxiety  Your doctor will talk to you about the different types of screening tests. They can help you decide which screenings to have. They can also explain what the results might mean.   Answering questions - The physical is a good time to ask the doctor or nurse questions about your health. If needed, they can refer you to other doctors or specialists, too.  Adults older than 65 years often need other care, too. As you get older, your doctor will talk to you about:   How to prevent falling at " home   Hearing or vision tests   Memory testing   How to take your medicines safely   Making sure that you have the help and support you need at home  All topics are updated as new evidence becomes available and our peer review process is complete.  This topic retrieved from [employer] on: May 02, 2024.  Topic 546983 Version 1.0  Release: 32.4.3 - C32.122  © 2024 UpToDate, Inc. and/or its affiliates. All rights reserved.  Consumer Information Use and Disclaimer   Disclaimer: This generalized information is a limited summary of diagnosis, treatment, and/or medication information. It is not meant to be comprehensive and should be used as a tool to help the user understand and/or assess potential diagnostic and treatment options. It does NOT include all information about conditions, treatments, medications, side effects, or risks that may apply to a specific patient. It is not intended to be medical advice or a substitute for the medical advice, diagnosis, or treatment of a health care provider based on the health care provider's examination and assessment of a patient's specific and unique circumstances. Patients must speak with a health care provider for complete information about their health, medical questions, and treatment options, including any risks or benefits regarding use of medications. This information does not endorse any treatments or medications as safe, effective, or approved for treating a specific patient. UpToDate, Inc. and its affiliates disclaim any warranty or liability relating to this information or the use thereof.The use of this information is governed by the Terms of Use, available at https://www.woltersReqSpot.comuwer.com/en/know/clinical-effectiveness-terms. 2024© UpToDate, Inc. and its affiliates and/or licensors. All rights reserved.  Copyright   © 2024 UpToDate, Inc. and/or its affiliates. All rights reserved.

## 2025-05-08 ENCOUNTER — HOSPITAL ENCOUNTER (OUTPATIENT)
Dept: RADIOLOGY | Age: 50
Discharge: HOME | End: 2025-05-08
Payer: COMMERCIAL

## 2025-05-08 ENCOUNTER — HOSPITAL ENCOUNTER (OUTPATIENT)
Dept: RADIOLOGY | Facility: HOSPITAL | Age: 50
End: 2025-05-08
Attending: INTERNAL MEDICINE
Payer: COMMERCIAL

## 2025-05-08 VITALS — WEIGHT: 157 LBS | HEIGHT: 66 IN | BODY MASS INDEX: 25.23 KG/M2

## 2025-05-08 DIAGNOSIS — R92.30 DENSE BREAST: ICD-10-CM

## 2025-05-08 DIAGNOSIS — Z12.31 ENCOUNTER FOR SCREENING MAMMOGRAM FOR MALIGNANT NEOPLASM OF BREAST: ICD-10-CM

## 2025-05-08 DIAGNOSIS — Z12.31 SCREENING MAMMOGRAM FOR BREAST CANCER: ICD-10-CM

## 2025-05-08 PROCEDURE — 77067 SCR MAMMO BI INCL CAD: CPT

## 2025-05-08 PROCEDURE — 76641 ULTRASOUND BREAST COMPLETE: CPT

## 2025-05-08 PROCEDURE — 77063 BREAST TOMOSYNTHESIS BI: CPT

## 2025-05-09 ENCOUNTER — RESULTS FOLLOW-UP (OUTPATIENT)
Age: 50
End: 2025-05-09

## 2025-05-09 DIAGNOSIS — N63.21 MASS OF UPPER OUTER QUADRANT OF LEFT BREAST: Primary | ICD-10-CM

## 2025-05-12 ENCOUNTER — RESULTS FOLLOW-UP (OUTPATIENT)
Dept: GYNECOLOGY | Facility: CLINIC | Age: 50
End: 2025-05-12

## 2025-05-12 ENCOUNTER — HOSPITAL ENCOUNTER (OUTPATIENT)
Dept: RADIOLOGY | Facility: HOSPITAL | Age: 50
Discharge: HOME | End: 2025-05-12
Attending: INTERNAL MEDICINE
Payer: COMMERCIAL

## 2025-05-12 DIAGNOSIS — Z82.49 FAMILY HISTORY OF CORONARY ARTERY DISEASE: ICD-10-CM

## 2025-05-12 DIAGNOSIS — E78.49 OTHER HYPERLIPIDEMIA: ICD-10-CM

## 2025-05-12 PROCEDURE — 75571 CT HRT W/O DYE W/CA TEST: CPT

## 2025-05-14 ENCOUNTER — HOSPITAL ENCOUNTER (OUTPATIENT)
Dept: ULTRASOUND IMAGING | Facility: CLINIC | Age: 50
Discharge: HOME | End: 2025-05-14
Attending: INTERNAL MEDICINE
Payer: COMMERCIAL

## 2025-05-14 VITALS — HEIGHT: 66 IN | WEIGHT: 157 LBS | BODY MASS INDEX: 25.23 KG/M2

## 2025-05-14 DIAGNOSIS — N63.21 MASS OF UPPER OUTER QUADRANT OF LEFT BREAST: ICD-10-CM

## 2025-05-14 PROCEDURE — 76642 ULTRASOUND BREAST LIMITED: CPT

## 2025-05-15 ENCOUNTER — OFFICE VISIT (OUTPATIENT)
Dept: GYNECOLOGY | Facility: CLINIC | Age: 50
End: 2025-05-15

## 2025-05-15 ENCOUNTER — RESULTS FOLLOW-UP (OUTPATIENT)
Age: 50
End: 2025-05-15

## 2025-05-15 DIAGNOSIS — N95.1 MENOPAUSAL SYMPTOMS: ICD-10-CM

## 2025-05-15 DIAGNOSIS — Z09 POSTOP CHECK: Primary | ICD-10-CM

## 2025-05-15 DIAGNOSIS — N84.0 ENDOMETRIAL POLYP: ICD-10-CM

## 2025-05-15 DIAGNOSIS — N95.1 MENOPAUSAL VASOMOTOR SYNDROME: ICD-10-CM

## 2025-05-15 PROCEDURE — 99024 POSTOP FOLLOW-UP VISIT: CPT | Performed by: OBSTETRICS & GYNECOLOGY

## 2025-05-15 RX ORDER — PROGESTERONE 200 MG/1
200 CAPSULE ORAL
Qty: 90 CAPSULE | Refills: 3 | Status: SHIPPED | OUTPATIENT
Start: 2025-05-15

## 2025-05-15 RX ORDER — ESTRADIOL 1 MG/1
1 TABLET ORAL DAILY
Qty: 90 TABLET | Refills: 3 | Status: SHIPPED | OUTPATIENT
Start: 2025-05-15

## 2025-05-15 NOTE — PROGRESS NOTES
Name: Julee Knight      : 1975      MRN: [id number]  Encounter Provider: Nanette Mcdowell DO  Encounter Date: 5/15/2025   Encounter department: [facility]  :  Assessment & Plan  Postop check         Endometrial polyp         Menopausal symptoms    Orders:    Progesterone 200 MG CAPS; Take 200 mg by mouth at bedtime    Menopausal vasomotor syndrome    Orders:    estradiol (Estrace) 1 mg tablet; Take 1 tablet (1 mg total) by mouth daily      Status post D&C, hysteroscopy-she will monitor for further bleeding.  She will continue her menopausal hormone therapy.  She would like to continue on her current dosing and oral progesterone as opposed to transdermal.  Prescription renewed.  She will call with any concerns and return in 1 year for her yearly exam.  History of Present Illness   HPI  Julee Knight is a 49 y.o. female who presents for postop visit.  She is status post D&C, hysteroscopy and removal of polyp 2 weeks ago.  She did very well.  She had a small amount of cramps and bleeding right after the procedure and then she bled on  until Wednesday this week.  She is no longer bleeding.  All bleeding was very light.    She is on oral estradiol 1 mg and oral progesterone 200 mg.  This was started by Dr. Jolly Aguillon 1 year ago.    I reviewed the normal pathology that showed a benign endometrial polyp with her along with the procedure including the pictures.      Review of Systems   Constitutional: Negative.    Gastrointestinal: Negative.    Genitourinary: Negative.           Objective   LMP 10/23/2022      Physical Exam

## 2025-05-23 ENCOUNTER — HOSPITAL ENCOUNTER (OUTPATIENT)
Dept: MRI IMAGING | Facility: HOSPITAL | Age: 50
Discharge: HOME | End: 2025-05-23
Attending: INTERNAL MEDICINE
Payer: COMMERCIAL

## 2025-05-23 DIAGNOSIS — R41.3 MEMORY CHANGES: ICD-10-CM

## 2025-05-23 PROCEDURE — 70551 MRI BRAIN STEM W/O DYE: CPT

## 2025-08-18 ENCOUNTER — PATIENT MESSAGE (OUTPATIENT)
Dept: GYNECOLOGY | Facility: CLINIC | Age: 50
End: 2025-08-18

## 2025-08-19 ENCOUNTER — TELEPHONE (OUTPATIENT)
Dept: OBGYN CLINIC | Facility: CLINIC | Age: 50
End: 2025-08-19

## (undated) DEVICE — DRAPE EQUIPMENT RF WAND

## (undated) DEVICE — GLOVE INDICATOR PI UNDERGLOVE SZ 6.5 BLUE

## (undated) DEVICE — TISSUE REMOVAL SYSTEM FLUID MANAGEMENT ACCESSORIES: Brand: SYMPHION

## (undated) DEVICE — TISSUE REMOVAL SYSTEM RESECTING DEVICE: Brand: SYMPHION

## (undated) DEVICE — STRL ALLENTOWN HYSTEROSCOPY PK: Brand: CARDINAL HEALTH

## (undated) DEVICE — IV SET EXT 30 IN

## (undated) DEVICE — PVC URETHRAL CATHETER: Brand: DOVER

## (undated) DEVICE — UNDER BUTTOCKS DRAPE W/FLUID CONTROL POUCH: Brand: CONVERTORS

## (undated) DEVICE — GLOVE PI ULTRA TOUCH SZ.6.5

## (undated) DEVICE — SCD SEQUENTIAL COMPRESSION COMFORT SLEEVE MEDIUM KNEE LENGTH: Brand: KENDALL SCD

## (undated) DEVICE — CYSTO TUBING SINGLE IRRIGATION

## (undated) DEVICE — PREMIUM DRY TRAY LF: Brand: MEDLINE INDUSTRIES, INC.